# Patient Record
Sex: FEMALE | Race: WHITE | NOT HISPANIC OR LATINO | Employment: OTHER | URBAN - METROPOLITAN AREA
[De-identification: names, ages, dates, MRNs, and addresses within clinical notes are randomized per-mention and may not be internally consistent; named-entity substitution may affect disease eponyms.]

---

## 2017-01-06 ENCOUNTER — ALLSCRIPTS OFFICE VISIT (OUTPATIENT)
Dept: OTHER | Facility: OTHER | Age: 67
End: 2017-01-06

## 2017-02-01 ENCOUNTER — HOSPITAL ENCOUNTER (OUTPATIENT)
Dept: NON INVASIVE DIAGNOSTICS | Facility: CLINIC | Age: 67
Discharge: HOME | End: 2017-02-01
Payer: COMMERCIAL

## 2017-02-01 DIAGNOSIS — I83.813 VARICOSE VEINS OF BOTH LOWER EXTREMITIES WITH PAIN: ICD-10-CM

## 2017-02-01 PROCEDURE — 93970 EXTREMITY STUDY: CPT

## 2017-02-06 ENCOUNTER — ALLSCRIPTS OFFICE VISIT (OUTPATIENT)
Dept: OTHER | Facility: OTHER | Age: 67
End: 2017-02-06

## 2017-03-03 ENCOUNTER — GENERIC CONVERSION - ENCOUNTER (OUTPATIENT)
Dept: OTHER | Facility: OTHER | Age: 67
End: 2017-03-03

## 2017-03-29 ENCOUNTER — ALLSCRIPTS OFFICE VISIT (OUTPATIENT)
Dept: OTHER | Facility: OTHER | Age: 67
End: 2017-03-29

## 2017-04-13 ENCOUNTER — LAB CONVERSION - ENCOUNTER (OUTPATIENT)
Dept: OTHER | Facility: OTHER | Age: 67
End: 2017-04-13

## 2017-04-13 PROCEDURE — 88305 TISSUE EXAM BY PATHOLOGIST: CPT | Performed by: INTERNAL MEDICINE

## 2017-04-14 ENCOUNTER — LAB REQUISITION (OUTPATIENT)
Dept: LAB | Facility: HOSPITAL | Age: 67
End: 2017-04-14
Payer: COMMERCIAL

## 2017-04-14 ENCOUNTER — ALLSCRIPTS OFFICE VISIT (OUTPATIENT)
Dept: OTHER | Facility: OTHER | Age: 67
End: 2017-04-14

## 2017-04-14 DIAGNOSIS — Z86.010 HISTORY OF COLONIC POLYPS: ICD-10-CM

## 2017-04-14 DIAGNOSIS — D12.3 BENIGN NEOPLASM OF TRANSVERSE COLON: ICD-10-CM

## 2017-05-15 ENCOUNTER — ALLSCRIPTS OFFICE VISIT (OUTPATIENT)
Dept: OTHER | Facility: OTHER | Age: 67
End: 2017-05-15

## 2017-05-15 ENCOUNTER — TRANSCRIBE ORDERS (OUTPATIENT)
Dept: ADMINISTRATIVE | Facility: HOSPITAL | Age: 67
End: 2017-05-15

## 2017-05-15 DIAGNOSIS — I44.7 LEFT BUNDLE BRANCH BLOCK: ICD-10-CM

## 2017-05-15 DIAGNOSIS — R94.31 ABNORMAL ELECTROCARDIOGRAM: Primary | ICD-10-CM

## 2017-05-15 DIAGNOSIS — E78.5 OTHER AND UNSPECIFIED HYPERLIPIDEMIA: ICD-10-CM

## 2017-06-09 ENCOUNTER — GENERIC CONVERSION - ENCOUNTER (OUTPATIENT)
Dept: OTHER | Facility: OTHER | Age: 67
End: 2017-06-09

## 2017-06-20 ENCOUNTER — HOSPITAL ENCOUNTER (OUTPATIENT)
Dept: NON INVASIVE DIAGNOSTICS | Facility: HOSPITAL | Age: 67
Discharge: HOME | End: 2017-06-20
Attending: INTERNAL MEDICINE
Payer: COMMERCIAL

## 2017-06-20 ENCOUNTER — HOSPITAL ENCOUNTER (OUTPATIENT)
Dept: NUCLEAR MEDICINE | Facility: HOSPITAL | Age: 67
Discharge: HOME | End: 2017-06-20
Attending: INTERNAL MEDICINE
Payer: COMMERCIAL

## 2017-06-20 DIAGNOSIS — E78.5 HYPERLIPIDEMIA: ICD-10-CM

## 2017-06-20 DIAGNOSIS — I44.7 LEFT BUNDLE BRANCH BLOCK: ICD-10-CM

## 2017-06-20 DIAGNOSIS — E78.5 OTHER AND UNSPECIFIED HYPERLIPIDEMIA: ICD-10-CM

## 2017-06-20 DIAGNOSIS — R94.31 ABNORMAL ELECTROCARDIOGRAM: ICD-10-CM

## 2017-06-20 PROCEDURE — 93306 TTE W/DOPPLER COMPLETE: CPT

## 2017-06-20 PROCEDURE — 78452 HT MUSCLE IMAGE SPECT MULT: CPT

## 2017-06-20 PROCEDURE — A9502 TC99M TETROFOSMIN: HCPCS

## 2017-06-20 PROCEDURE — 93017 CV STRESS TEST TRACING ONLY: CPT

## 2017-06-20 RX ADMIN — REGADENOSON 0.4 MG: 0.08 INJECTION, SOLUTION INTRAVENOUS at 09:54

## 2017-07-26 ENCOUNTER — ALLSCRIPTS OFFICE VISIT (OUTPATIENT)
Dept: OTHER | Facility: OTHER | Age: 67
End: 2017-07-26

## 2017-08-04 ENCOUNTER — ALLSCRIPTS OFFICE VISIT (OUTPATIENT)
Dept: OTHER | Facility: OTHER | Age: 67
End: 2017-08-04

## 2017-08-23 ENCOUNTER — ALLSCRIPTS OFFICE VISIT (OUTPATIENT)
Dept: OTHER | Facility: OTHER | Age: 67
End: 2017-08-23

## 2017-09-04 ENCOUNTER — GENERIC CONVERSION - ENCOUNTER (OUTPATIENT)
Dept: OTHER | Facility: OTHER | Age: 67
End: 2017-09-04

## 2017-10-16 ENCOUNTER — ALLSCRIPTS OFFICE VISIT (OUTPATIENT)
Dept: OTHER | Facility: OTHER | Age: 67
End: 2017-10-16

## 2017-10-16 DIAGNOSIS — Z12.31 ENCOUNTER FOR SCREENING MAMMOGRAM FOR MALIGNANT NEOPLASM OF BREAST: ICD-10-CM

## 2017-10-16 DIAGNOSIS — E78.5 HYPERLIPIDEMIA: ICD-10-CM

## 2017-10-16 DIAGNOSIS — R20.0 ANESTHESIA OF SKIN: ICD-10-CM

## 2017-10-16 DIAGNOSIS — L23.7 ALLERGIC CONTACT DERMATITIS DUE TO PLANTS, EXCEPT FOOD: ICD-10-CM

## 2017-10-16 DIAGNOSIS — E03.9 HYPOTHYROIDISM: ICD-10-CM

## 2017-10-16 DIAGNOSIS — M79.7 FIBROMYALGIA: ICD-10-CM

## 2017-10-16 DIAGNOSIS — R10.9 ABDOMINAL PAIN: ICD-10-CM

## 2017-10-17 NOTE — PROGRESS NOTES
Assessment  1  Rhus dermatitis (692 6) (L23 7)   2  Abdominal pain, unspecified abdominal location (789 00) (R10 9)   3  Osteopenia (733 90) (M85 80)    Plan  Abdominal pain, unspecified abdominal location    · (1) COMPREHENSIVE METABOLIC PANEL; Status:Active; Requested for:16Oct2017; Abdominal pain, unspecified abdominal location, Hyperlipidemia, Rhus dermatitis    · (1) GGT; Status:Active; Requested for:16Oct2017;   Asymptomatic postmenopausal state, Osteopenia    · * DXA BONE DENSITY SPINE HIP AND PELVIS; Status:Hold For - Scheduling;  Requested for:24Nov2017;   Encounter for screening mammogram for breast cancer    · * MAMMO SCREENING BILATERAL W CAD; Status:Hold For - Scheduling; Requested  for:16Oct2017;   Fibromyalgia    · (1) VITAMIN D 25-HYDROXY; Status:Active; Requested for:16Oct2017;   Hyperlipidemia    · (1) LIPID PANEL FASTING W DIRECT LDL REFLEX; Status:Active; Requested  for:16Oct2017;   Hypothyroidism    · (1) TSH; Status:Active; Requested for:16Oct2017;    · * US ABDOMEN COMPLETE; Status:Hold For - Scheduling; Requested for:16Oct2017;   Numbness    · (1) VITAMIN B12; Status:Active; Requested for:16Oct2017;   Rhus dermatitis    · MethylPREDNISolone 4 MG Oral Tablet Therapy Pack; take as directed   · MethylPREDNISolone Acetate 80 MG/ML Injection Suspension; USE AS  DIRECTED; To Be Done: 54IMD9276    Discussion/Summary    Discuss with GI whether taking Pepcid would work  Possible side effects of new medications were reviewed with the patient/guardian today  The treatment plan was reviewed with the patient/guardian  The patient/guardian understands and agrees with the treatment plan      Chief Complaint  Patient c/o poison ivy 10 days  History of Present Illness  Rash (Brief): The patient is being seen for an initial evaluation of this episode of a rash  Symptoms: rash,-- widespread rash,-- patchy rash-- and-- pruritus, but-- no fever,-- no malaise,-- no fatigue-- and-- no headache   Symptom Cluster Details: she reports the symptoms are worsening  Current Treatment: she is currently not being treated for this problem  Pertinent History: Exposure history: plants  Evaluation and Treatment History: she has not been previously evaluated for this problem  Past treatment has included antihistamines  Review of Systems    Gastrointestinal: abdominal pain-- and-- nausea  Neurological: numbness-- and-- CARPAL TUNNEL  Active Problems  1  Abnormal ECG (794 31) (R94 31)   2  Colon cancer screening (V76 51) (Z12 11)   3  Fibromyalgia (729 1) (M79 7)   4  GERD (gastroesophageal reflux disease) (530 81) (K21 9)   5  Herniated Disc (L5 - S1) Central (722 10)   6  Herniated Disc (T12 - L1) Right (722 11)   7  Hyperlipidemia (272 4) (E78 5)   8  Hypertension (401 9) (I10)   9  Hypothyroidism (244 9) (E03 9)   10  Irritable bowel syndrome with constipation (564 1) (K58 1)   11  LBBB (left bundle branch block) (426 3) (I44 7)   12  Lumbosacral spine instability (724 6) (M53 2X7)   13  Need for vaccination (V05 9) (Z23)   14  Osteoarthritis of left knee (715 96) (M17 12)   15  Pain syndrome, chronic (338 4) (G89 4)   16  PUD (peptic ulcer disease) (533 90) (K27 9)   17  Sacroiliitis (720 2) (M46 1)   18  Screening for genitourinary condition (V81 6) (Z13 89)   19  Varicose veins of legs (454 9) (I83 93)    Past Medical History  1  History of Acute URI (465 9) (J06 9)   2  History of Arthritis (V13 4)   3  History of Chronic constipation (564 00) (K59 09)   4  History of Depression (311) (F32 9)   5  History of Encounter for Medicare annual wellness exam (V70 0) (Z00 00)   6  History of Encounter for routine gynecological examination (V72 31) (Z01 419)   7  History of Encounter for special screening examination for genitourinary disorder (V81 6)   (Z13 89)   8  History of Flu vaccine need (V04 81) (Z23)   9  History of Frequent headaches (784 0) (R51)   10  History of High cholesterol (272 0) (E78 00)   11  History of acute bronchitis (V12 69) (Z87 09)   12  History of balance disorder (V13 89) (Z87 898)   13  History of chest pain (V13 89) (Z87 898)   14  History of colonic polyps (V12 72) (Z86 010)   15  History of dermatitis (V13 3) (Z87 2)   16  History of esophageal reflux (V12 79) (Z87 19)   17  History of heartburn (V12 79) (Z87 898)   18  History of low back pain (V13 59) (Z87 39)   19  History of osteoarthritis (V13 4) (Z87 39)   20  Hypertension (401 9) (I10)   21  History of Incontinence (788 30) (R32)   22  History of Neuritis (729 2) (M79 2)   23  History of Pain in extremity (729 5) (M79 609)   24  History of Screening for osteoporosis (V82 81) (Z13 820)   25  History of Stress Incontinence (788 39)   26  History of Tinnitus of both ears (388 30) (H93 13)   27  History of Trouble swallowing (787 20) (R13 10)   28  History of Varicose veins of both lower extremities with pain (454 8) (I83 813)   29  History of Yeast infection (112 9) (B37 9)  Active Problems And Past Medical History Reviewed: The active problems and past medical history were reviewed and updated today  Family History  Mother    1  Family history of Breast Cancer (V16 3)  Family History    2  Family history of Carcinoma Of The Stomach (V16 0)   3  Family history of Colon Cancer (V16 0)   4  Family history of Congestive Heart Failure   5  Family history of Diabetes Mellitus (V18 0)   6  Family history of Hypertension (V17 49)   7  Family history of Thyroid Disorder (V18 19)  Family History Reviewed: The family history was reviewed and updated today  Social History   · Denied: History of Caffeine Use   · Denied: History of Drug Use   · Five children   · 4 biological, 1 adopted   · Marital History -  (V61 03)   · Never A Smoker   · Never Drank Alcohol   · 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   · Retired   · Uses Safety Equipment - Seatbelts  The social history was reviewed and updated today  Surgical History  1   History of Breast Surgery   2  History of Incisional Breast Biopsy   3  History of Knee Surgery   4  History of Laparoscopy (Diagnostic)   5  History of Neck Surgery   6  History of Nose Surgery   7  History of Salpingo-oophorectomy Left Side   8  History of Salpingo-oophorectomy Right Side   9  History of Total Abdominal Hysterectomy  Surgical History Reviewed: The surgical history was reviewed and updated today  Current Meds   1  AmLODIPine Besylate 2 5 MG Oral Tablet; take 1 tablet by mouth once daily; Therapy: 12WVF8511 to (Dulce Mckeon)  Requested for: 55Ivh7434; Last   Rx:59Bem5992 Ordered   2  Butalbital-APAP-Caffeine -40 MG Oral Tablet; TAKE 1 OR 2 TABLETS EVERY 4   HOURS AS NEEDED FOR HEADACHE  DO NOT EXCEED 6 TABLETS IN 24 HOURS; Therapy: 85CVA3835 to (Evaluate:65Ude9299)  Requested for: 60TLY1887; Last   Rx:29Mar2017 Ordered   3  EQL Vitamin D3 1000 UNIT TABS; Take 1 tablet daily Recorded   4  Levothyroxine Sodium 100 MCG Oral Tablet; take 1 tablet by mouth once daily; Therapy: 65ZEK4887 to (Evaluate:30Hbv4259)  Requested for: 45PTH2078; Last   Rx:12Cmd5988 Ordered   5  Multivitamins CAPS; TAKE 1 CAPSULE Daily Recorded   6  Nystatin 750353 UNIT/ML Mouth/Throat Suspension; SWISH AND SWALLOW 5ML 4   TIMES DAILY; Therapy: 07FIX2104 to ([facility])  Requested for: 29BJG5175; Last   Rx:29Mar2017 Ordered    The medication list was reviewed and updated today  Allergies  1  Avelox TABS   2  Benzonatate CAPS   3  Biaxin TABS   4  Caffeine TABS   5  Chlorhexidine Digluconate SOLN   6  Co Q 10 CAPS   7  Codeine Sulfate TABS   8  Doxycycline Hyclate CAPS   9  Latex Gloves MISC   10  Levaquin TABS   11  nizatidine   12  Oxycodone-Acetaminophen CAPS   13  Pravastatin Sodium TABS   14  RABEprazole Sodium TBEC   15  Ranitidine TABS   16  Surgical TAPE   17  Metoclopramide HCl TABS   18   Trulance TABS    Vitals   Recorded: 39HOF2185 04:12PM   Temperature 98 4 F   Heart Rate 62 Respiration 16   Systolic 293   Diastolic 78   Height 5 ft 5 5 in   Weight 211 lb 6 08 oz   BMI Calculated 34 64   BSA Calculated 2 03     Physical Exam    Constitutional   General appearance: Abnormal   uncomfortable-- and-- obese  Pulmonary   Auscultation of lungs: Clear to auscultation  Cardiovascular   Auscultation of heart: Normal rate and rhythm, normal S1 and S2, without murmurs  Abdomen   Abdomen: Non-tender, no masses  Lymphatic   Palpation of lymph nodes in neck: No lymphadenopathy  Skin   Examination of the skin for lesions: Abnormal   Multiple, red papule(s)  on the abdomen and on both arms  Signatures   Electronically signed by :  Phan Dimas MD; Oct 16 2017  4:37PM EST                       (Author)

## 2017-10-24 ENCOUNTER — TRANSCRIBE ORDERS (OUTPATIENT)
Dept: ADMINISTRATIVE | Facility: HOSPITAL | Age: 67
End: 2017-10-24

## 2017-10-24 ENCOUNTER — APPOINTMENT (OUTPATIENT)
Dept: LAB | Facility: MEDICAL CENTER | Age: 67
End: 2017-10-24
Payer: COMMERCIAL

## 2017-10-24 DIAGNOSIS — E78.5 HYPERLIPIDEMIA: ICD-10-CM

## 2017-10-24 DIAGNOSIS — L23.7 ALLERGIC CONTACT DERMATITIS DUE TO PLANTS, EXCEPT FOOD: ICD-10-CM

## 2017-10-24 DIAGNOSIS — R10.9 ABDOMINAL PAIN: ICD-10-CM

## 2017-10-24 DIAGNOSIS — M79.7 FIBROMYALGIA: ICD-10-CM

## 2017-10-24 DIAGNOSIS — E03.9 HYPOTHYROIDISM: ICD-10-CM

## 2017-10-24 DIAGNOSIS — R20.0 ANESTHESIA OF SKIN: ICD-10-CM

## 2017-10-24 LAB — 25(OH)D3 SERPL-MCNC: 32.2 NG/ML (ref 30–100)

## 2017-10-24 PROCEDURE — 84443 ASSAY THYROID STIM HORMONE: CPT

## 2017-10-24 PROCEDURE — 82306 VITAMIN D 25 HYDROXY: CPT

## 2017-10-24 PROCEDURE — 82977 ASSAY OF GGT: CPT

## 2017-10-24 PROCEDURE — 80061 LIPID PANEL: CPT

## 2017-10-24 PROCEDURE — 80053 COMPREHEN METABOLIC PANEL: CPT

## 2017-10-24 PROCEDURE — 36415 COLL VENOUS BLD VENIPUNCTURE: CPT

## 2017-10-24 PROCEDURE — 82607 VITAMIN B-12: CPT

## 2017-10-25 ENCOUNTER — GENERIC CONVERSION - ENCOUNTER (OUTPATIENT)
Dept: OTHER | Facility: OTHER | Age: 67
End: 2017-10-25

## 2017-10-25 LAB
ALBUMIN SERPL BCP-MCNC: 3.5 G/DL (ref 3.5–5)
ALP SERPL-CCNC: 92 U/L (ref 46–116)
ALT SERPL W P-5'-P-CCNC: 24 U/L (ref 12–78)
ANION GAP SERPL CALCULATED.3IONS-SCNC: 7 MMOL/L (ref 4–13)
AST SERPL W P-5'-P-CCNC: 11 U/L (ref 5–45)
BILIRUB SERPL-MCNC: 0.57 MG/DL (ref 0.2–1)
BUN SERPL-MCNC: 20 MG/DL (ref 5–25)
CALCIUM SERPL-MCNC: 8.8 MG/DL (ref 8.3–10.1)
CHLORIDE SERPL-SCNC: 106 MMOL/L (ref 100–108)
CHOLEST SERPL-MCNC: 209 MG/DL (ref 50–200)
CO2 SERPL-SCNC: 27 MMOL/L (ref 21–32)
CREAT SERPL-MCNC: 0.82 MG/DL (ref 0.6–1.3)
GFR SERPL CREATININE-BSD FRML MDRD: 74 ML/MIN/1.73SQ M
GGT SERPL-CCNC: 27 U/L (ref 5–85)
GLUCOSE P FAST SERPL-MCNC: 78 MG/DL (ref 65–99)
HDLC SERPL-MCNC: 74 MG/DL (ref 40–60)
LDLC SERPL CALC-MCNC: 118 MG/DL (ref 0–100)
POTASSIUM SERPL-SCNC: 4 MMOL/L (ref 3.5–5.3)
PROT SERPL-MCNC: 7.3 G/DL (ref 6.4–8.2)
SODIUM SERPL-SCNC: 140 MMOL/L (ref 136–145)
TRIGL SERPL-MCNC: 86 MG/DL
TSH SERPL DL<=0.05 MIU/L-ACNC: 0.56 UIU/ML (ref 0.36–3.74)
VIT B12 SERPL-MCNC: 364 PG/ML (ref 100–900)

## 2017-11-13 ENCOUNTER — HOSPITAL ENCOUNTER (OUTPATIENT)
Dept: ULTRASOUND IMAGING | Facility: MEDICAL CENTER | Age: 67
Discharge: HOME | End: 2017-11-13
Payer: COMMERCIAL

## 2017-11-13 DIAGNOSIS — E03.9 HYPOTHYROIDISM: ICD-10-CM

## 2017-11-13 PROCEDURE — 76700 US EXAM ABDOM COMPLETE: CPT

## 2017-11-15 ENCOUNTER — GENERIC CONVERSION - ENCOUNTER (OUTPATIENT)
Dept: OTHER | Facility: OTHER | Age: 67
End: 2017-11-15

## 2017-11-24 DIAGNOSIS — M85.80 OTHER SPECIFIED DISORDERS OF BONE DENSITY AND STRUCTURE, UNSPECIFIED SITE (CODE): ICD-10-CM

## 2017-11-24 DIAGNOSIS — Z78.0 ASYMPTOMATIC MENOPAUSAL STATE: ICD-10-CM

## 2017-11-24 DIAGNOSIS — K13.70 LESION OF ORAL MUCOSA: ICD-10-CM

## 2017-12-01 ENCOUNTER — GENERIC CONVERSION - ENCOUNTER (OUTPATIENT)
Dept: FAMILY MEDICINE CLINIC | Facility: CLINIC | Age: 67
End: 2017-12-01

## 2017-12-05 ENCOUNTER — ALLSCRIPTS OFFICE VISIT (OUTPATIENT)
Dept: OTHER | Facility: OTHER | Age: 67
End: 2017-12-05

## 2017-12-06 ENCOUNTER — APPOINTMENT (OUTPATIENT)
Dept: LAB | Facility: MEDICAL CENTER | Age: 67
End: 2017-12-06
Payer: COMMERCIAL

## 2017-12-06 ENCOUNTER — TRANSCRIBE ORDERS (OUTPATIENT)
Dept: ADMINISTRATIVE | Facility: HOSPITAL | Age: 67
End: 2017-12-06

## 2017-12-06 DIAGNOSIS — K13.70 LESION OF ORAL MUCOSA: ICD-10-CM

## 2017-12-06 LAB
ALBUMIN SERPL BCP-MCNC: 3.4 G/DL (ref 3.5–5)
ALP SERPL-CCNC: 85 U/L (ref 46–116)
ALT SERPL W P-5'-P-CCNC: 23 U/L (ref 12–78)
ANION GAP SERPL CALCULATED.3IONS-SCNC: 5 MMOL/L (ref 4–13)
AST SERPL W P-5'-P-CCNC: 16 U/L (ref 5–45)
BASOPHILS # BLD AUTO: 0.07 THOUSANDS/ΜL (ref 0–0.1)
BASOPHILS NFR BLD AUTO: 1 % (ref 0–1)
BILIRUB SERPL-MCNC: 0.26 MG/DL (ref 0.2–1)
BUN SERPL-MCNC: 17 MG/DL (ref 5–25)
CALCIUM SERPL-MCNC: 9.4 MG/DL (ref 8.3–10.1)
CHLORIDE SERPL-SCNC: 103 MMOL/L (ref 100–108)
CO2 SERPL-SCNC: 30 MMOL/L (ref 21–32)
CREAT SERPL-MCNC: 0.97 MG/DL (ref 0.6–1.3)
EOSINOPHIL # BLD AUTO: 0.22 THOUSAND/ΜL (ref 0–0.61)
EOSINOPHIL NFR BLD AUTO: 3 % (ref 0–6)
ERYTHROCYTE [DISTWIDTH] IN BLOOD BY AUTOMATED COUNT: 13.8 % (ref 11.6–15.1)
GFR SERPL CREATININE-BSD FRML MDRD: 60 ML/MIN/1.73SQ M
GLUCOSE SERPL-MCNC: 86 MG/DL (ref 65–140)
HCT VFR BLD AUTO: 39.8 % (ref 34.8–46.1)
HGB BLD-MCNC: 12.9 G/DL (ref 11.5–15.4)
LYMPHOCYTES # BLD AUTO: 2.35 THOUSANDS/ΜL (ref 0.6–4.47)
LYMPHOCYTES NFR BLD AUTO: 37 % (ref 14–44)
MCH RBC QN AUTO: 32.1 PG (ref 26.8–34.3)
MCHC RBC AUTO-ENTMCNC: 32.4 G/DL (ref 31.4–37.4)
MCV RBC AUTO: 99 FL (ref 82–98)
MONOCYTES # BLD AUTO: 0.66 THOUSAND/ΜL (ref 0.17–1.22)
MONOCYTES NFR BLD AUTO: 10 % (ref 4–12)
NEUTROPHILS # BLD AUTO: 3.11 THOUSANDS/ΜL (ref 1.85–7.62)
NEUTS SEG NFR BLD AUTO: 49 % (ref 43–75)
NRBC BLD AUTO-RTO: 0 /100 WBCS
PLATELET # BLD AUTO: 234 THOUSANDS/UL (ref 149–390)
PMV BLD AUTO: 10.7 FL (ref 8.9–12.7)
POTASSIUM SERPL-SCNC: 4 MMOL/L (ref 3.5–5.3)
PROT SERPL-MCNC: 7.5 G/DL (ref 6.4–8.2)
RBC # BLD AUTO: 4.02 MILLION/UL (ref 3.81–5.12)
SODIUM SERPL-SCNC: 138 MMOL/L (ref 136–145)
WBC # BLD AUTO: 6.42 THOUSAND/UL (ref 4.31–10.16)

## 2017-12-06 PROCEDURE — 85025 COMPLETE CBC W/AUTO DIFF WBC: CPT

## 2017-12-06 PROCEDURE — 80053 COMPREHEN METABOLIC PANEL: CPT

## 2017-12-06 PROCEDURE — 36415 COLL VENOUS BLD VENIPUNCTURE: CPT

## 2017-12-06 NOTE — PROGRESS NOTES
Assessment    1  Lateral epicondylitis of left elbow (726 32) (M77 12)   2  Tinnitus of both ears (388 30) (H93 13)   3  Oral mucosal lesion (528 9) (K13 70)    Plan  Hypertension    · AmLODIPine Besylate 2 5 MG Oral Tablet; take 1 tablet by mouth once daily  Oral mucosal lesion    · (1) CBC/PLT/DIFF; Status:Active; Requested for:58Kqj2555;    · (1) COMPREHENSIVE METABOLIC PANEL; Status:Active; Requested for:84Ptk7581;     Chief Complaint  Patient c/o difficulty eating, stomach ache, nausea x3 weeks, and left elbow pain  Pt c/o rash on right foot  History of Present Illness  HPI: c/o persistant nausea and then has to have bowel movement, uncomfortable due to rectal discomfort,still cc/o urinary frequency especially nocturiflu shot to lt elbow pain and then she banged it few days ago now with persistant painskin on feet      Review of Systems   Constitutional: feeling poorly-- and-- feeling tired, but-- No fever, no chills, feels well, no tiredness, no recent weight gain or loss  ENT: no ear ache, no loss of hearing, no nosebleeds or nasal discharge, no sore throat or hoarseness  Cardiovascular: no complaints of slow or fast heart rate, no chest pain, no palpitations, no leg claudication or lower extremity edema  Respiratory: no complaints of shortness of breath, no wheezing, no dyspnea on exertion, no orthopnea or PND  Breasts: no complaints of breast pain, breast lump or nipple discharge  Gastrointestinal: no complaints of abdominal pain, no constipation, no nausea or diarrhea, no vomiting, no bloody stools  Genitourinary: as noted in HPI  Musculoskeletal: myalgias  Integumentary: no complaints of skin rash or lesion, no itching or dry skin, no skin wounds  Neurological: as noted in HPI  Active Problems  1  Abdominal pain, unspecified abdominal location (789 00) (R10 9)   2  Abnormal ECG (794 31) (R94 31)   3  Asymptomatic postmenopausal state (V49 81) (Z78 0)   4   Colon cancer screening (V76 51) (Z12 11)   5  Encounter for screening mammogram for breast cancer (V76 12) (Z12 31)   6  Fibromyalgia (729 1) (M79 7)   7  GERD (gastroesophageal reflux disease) (530 81) (K21 9)   8  Herniated Disc (L5 - S1) Central (722 10)   9  Herniated Disc (T12 - L1) Right (722 11)   10  Hyperlipidemia (272 4) (E78 5)   11  Hypertension (401 9) (I10)   12  Hypothyroidism (244 9) (E03 9)   13  Irritable bowel syndrome with constipation (564 1) (K58 1)   14  LBBB (left bundle branch block) (426 3) (I44 7)   15  Lumbosacral spine instability (724 6) (M53 2X7)   16  Need for vaccination (V05 9) (Z23)   17  Needs flu shot (V04 81) (Z23)   18  Numbness (782 0) (R20 0)   19  Osteoarthritis of left knee (715 96) (M17 12)   20  Osteopenia (733 90) (M85 80)   21  Pain syndrome, chronic (338 4) (G89 4)   22  PUD (peptic ulcer disease) (533 90) (K27 9)   23  Rhus dermatitis (692 6) (L23 7)   24  Sacroiliitis (720 2) (M46 1)   25  Screening for genitourinary condition (V81 6) (Z13 89)   26  Varicose veins of legs (454 9) (I83 93)    Past Medical History    1  History of Acute URI (465 9) (J06 9)   2  History of Arthritis (V13 4)   3  History of Chronic constipation (564 00) (K59 09)   4  History of Depression (311) (F32 9)   5  History of Encounter for Medicare annual wellness exam (V70 0) (Z00 00)   6  History of Encounter for routine gynecological examination (V72 31) (Z01 419)   7  History of Encounter for special screening examination for genitourinary disorder (V81 6) (Z13 89)   8  History of Flu vaccine need (V04 81) (Z23)   9  History of Frequent headaches (784 0) (R51)   10  History of High cholesterol (272 0) (E78 00)   11  History of acute bronchitis (V12 69) (Z87 09)   12  History of balance disorder (V13 89) (Z87 898)   13  History of chest pain (V13 89) (Z87 898)   14  History of colonic polyps (V12 72) (Z86 010)   15  History of dermatitis (V13 3) (Z87 2)   16  History of esophageal reflux (V12 79) (Z87 19)   17   History of heartburn (V12 79) (Z87 898)   18  History of low back pain (V13 59) (Z87 39)   19  History of osteoarthritis (V13 4) (Z87 39)   20  Hypertension (401 9) (I10)   21  History of Incontinence (788 30) (R32)   22  History of Neuritis (729 2) (M79 2)   23  History of Pain in extremity (729 5) (M79 609)   24  History of Screening for osteoporosis (V82 81) (Z13 820)   25  History of Stress Incontinence (788 39)   26  History of Trouble swallowing (787 20) (R13 10)   27  History of Varicose veins of both lower extremities with pain (454 8) (I83 813)   28  History of Yeast infection (112 9) (B37 9)  Active Problems And Past Medical History Reviewed: The active problems and past medical history were reviewed and updated today  Family History  Mother    1  Family history of Breast Cancer (V16 3)  Family History    2  Family history of Carcinoma Of The Stomach (V16 0)   3  Family history of Colon Cancer (V16 0)   4  Family history of Congestive Heart Failure   5  Family history of Diabetes Mellitus (V18 0)   6  Family history of Hypertension (V17 49)   7  Family history of Thyroid Disorder (V18 19)    Social History   · Denied: History of Caffeine Use   · Denied: History of Drug Use   · Five children   · 4 biological, 1 adopted   · Marital History -  (V61 03)   · Never A Smoker   · Never Drank Alcohol   · Episcopalian Affiliation Orthodox   · Retired   · Uses Safety Equipment - Seatbelts  The social history was reviewed and updated today  The social history was reviewed and is unchanged  Surgical History    1  History of Breast Surgery   2  History of Incisional Breast Biopsy   3  History of Knee Surgery   4  History of Laparoscopy (Diagnostic)   5  History of Neck Surgery   6  History of Nose Surgery   7  History of Salpingo-oophorectomy Left Side   8  History of Salpingo-oophorectomy Right Side   9  History of Total Abdominal Hysterectomy    Current Meds   1   AmLODIPine Besylate 2 5 MG Oral Tablet; take 1 tablet by mouth once daily; Therapy: 51KWG3038 to (22 172293)  Requested for: 79OUL5060; Last Rx:30Oct2017 Ordered   2  Butalbital-APAP-Caffeine -40 MG Oral Tablet; TAKE 1 OR 2 TABLETS EVERY 4 HOURS AS NEEDED FOR HEADACHE  DO NOT EXCEED 6 TABLETS IN 24 HOURS; Therapy: 20TTN0585 to (Evaluate:03Aih1322)  Requested for: 79GFO2701; Last Rx:29Mar2017 Ordered   3  EQL Vitamin D3 1000 UNIT TABS; Take 1 tablet daily Recorded   4  Levothyroxine Sodium 100 MCG Oral Tablet; take 1 tablet by mouth once daily; Therapy: 99JPF1410 to (Evaluate:14Jan2018)  Requested for: 27HQU9479; Last Rx:01Vxc5544 Ordered   5  Multivitamins CAPS; TAKE 1 CAPSULE Daily Recorded   6  Nystatin 785182 UNIT/ML Mouth/Throat Suspension; SWISH AND SWALLOW 5ML 4 TIMES DAILY; Therapy: 53VMS0932 to (Chet Rinne)  Requested for: 55DJX5022; Last Rx:29Mar2017 Ordered    The medication list was reviewed and updated today  Allergies  1  Avelox TABS   2  Benzonatate CAPS   3  Biaxin TABS   4  Caffeine TABS   5  Chlorhexidine Digluconate SOLN   6  Co Q 10 CAPS   7  Codeine Sulfate TABS   8  Doxycycline Hyclate CAPS   9  Latex Gloves MISC   10  Levaquin TABS   11  nizatidine   12  Oxycodone-Acetaminophen CAPS   13  Pravastatin Sodium TABS   14  RABEprazole Sodium TBEC   15  Ranitidine TABS   16  Surgical TAPE   17  Metoclopramide HCl TABS   18  Trulance TABS    Vitals   Recorded: 13ERV6836 02:09PM   Temperature 98 4 F   Heart Rate 66   Respiration 16   Systolic 426   Diastolic 82   Height 5 ft 5 5 in   Weight 206 lb 2 08 oz   BMI Calculated 33 78   BSA Calculated 2 01       Physical Exam   Constitutional  General appearance: No acute distress, well appearing and well nourished  Ears, Nose, Mouth, and Throat  Otoscopic examination: Tympanic membranes translucent with normal light reflex  Canals patent without erythema  Oropharynx: Normal with no erythema, edema, exudate or lesions     Pulmonary  Auscultation of lungs: Clear to auscultation  Cardiovascular  Auscultation of heart: Normal rate and rhythm, normal S1 and S2, without murmurs  Abdomen  Abdomen: Non-tender, no masses  Musculoskeletal  Inspection/palpation of joints, bones, and muscles: Abnormal  -- er laterael epicondyle lt elbow  Skin  Skin and subcutaneous tissue: Normal without rashes or lesions           Signatures   Electronically signed by : Hernan Bee DO; Dec  5 2017  3:28PM EST                       (Author)

## 2017-12-08 ENCOUNTER — GENERIC CONVERSION - ENCOUNTER (OUTPATIENT)
Dept: OTHER | Facility: OTHER | Age: 67
End: 2017-12-08

## 2017-12-11 ENCOUNTER — GENERIC CONVERSION - ENCOUNTER (OUTPATIENT)
Dept: OTHER | Facility: OTHER | Age: 67
End: 2017-12-11

## 2018-01-11 NOTE — RESULT NOTES
Verified Results  (1) COMPREHENSIVE METABOLIC PANEL 10RDL3205 69:23LA [facility] Order Number: [id number]     Test Name Result Flag Reference   GLUCOSE,RANDM 91 mg/dL     If the patient is fasting, the ADA then defines impaired fasting glucose as > 100 mg/dL and diabetes as > or equal to 123 mg/dL  SODIUM 141 mmol/L  136-145   POTASSIUM 3 6 mmol/L  3 5-5 3   CHLORIDE 103 mmol/L  100-108   CARBON DIOXIDE 30 mmol/L  21-32   ANION GAP (CALC) 8 mmol/L  4-13   BLOOD UREA NITROGEN 16 mg/dL  5-25   CREATININE 0 91 mg/dL  0 60-1 30   Standardized to IDMS reference method   CALCIUM 9 7 mg/dL  8 3-10 1   BILI, TOTAL 0 47 mg/dL  0 20-1 00   ALK PHOSPHATAS 106 U/L     ALT (SGPT) 37 U/L  12-78   AST(SGOT) 23 U/L  5-45   ALBUMIN 3 8 g/dL  3 5-5 0   TOTAL PROTEIN 7 6 g/dL  6 4-8 2   eGFR Non-African American      >60 0 ml/min/1 73sq m   - Patient Instructions: This is a fasting blood test  Water, black tea or black coffee only after 9:00pm the night before test Drink 2 glasses of water the morning of test - Patient Instructions: This bloodwork is non-fasting  Please drink two glasses of   water morning of bloodwork  National Kidney Disease Education Program recommendations are as follows:  GFR calculation is accurate only with a steady state creatinine  Chronic Kidney disease less than 60 ml/min/1 73 sq  meters  Kidney failure less than 15 ml/min/1 73 sq  meters  (1) LIPID PANEL FASTING W DIRECT LDL REFLEX 00NTD7820 09:07AM [facility] Order Number: [id number]     Test Name Result Flag Reference   CHOLESTEROL 248 mg/dL H    LDL CHOLESTEROL CALCULATED 152 mg/dL H 0-100   - Patient Instructions: This is a fasting blood test  Water, black tea or black coffee only after 9:00pm the night before test   Drink 2 glasses of water the morning of test     - Patient Instructions:  This is a fasting blood test  Water, black tea or black coffee only after 9:00pm the night before test Drink 2 glasses of water the morning of test - Patient Instructions: This bloodwork is non-fasting  Please drink two glasses of   water morning of bloodwork  Triglyceride:         Normal              <150 mg/dl       Borderline High    150-199 mg/dl       High               200-499 mg/dl       Very High          >499 mg/dl  Cholesterol:         Desirable        <200 mg/dl      Borderline High  200-239 mg/dl      High             >239 mg/dl  HDL Cholesterol:        High    >59 mg/dL      Low     <41 mg/dL  LDL Cholesterol:        Optimal          <100 mg/dl        Near Optimal     100-129 mg/dl        Above Optimal          Borderline High   130-159 mg/dl          High              160-189 mg/dl          Very High        >189 mg/dl  LDL CALCULATED:    This screening LDL is a calculated result  It does not have the accuracy of the Direct Measured LDL in the monitoring of patients with hyperlipidemia and/or statin therapy  Direct Measure LDL (VAN986) must be ordered separately in these patients  TRIGLYCERIDES 60 mg/dL  <=150   Specimen collection should occur prior to N-Acetylcysteine or Metamizole administration due to the potential for falsely depressed results  HDL,DIRECT 84 mg/dL H 40-60   Specimen collection should occur prior to Metamizole administration due to the potential for falsely depressed results  (1) TSH 92DOA6081 09:07AM [facility] Order Number: [id number]     Test Name Result Flag Reference   TSH 0 420 uIU/mL  0 358-3 740   - Patient Instructions: This bloodwork is non-fasting  Please drink two glasses of water morning of bloodwork  - Patient Instructions: This is a fasting blood test  Water, black tea or black coffee only after 9:00pm the night before test Drink 2 glasses of water the morning of test - Patient Instructions: This bloodwork is non-fasting  Please drink two glasses of   water morning of bloodwork    Patients undergoing fluorescein dye angiography may retain small amounts of fluorescein in the body for 48-72 hours post procedure  Samples containing fluorescein can produce falsely depressed TSH values  If the patient had this procedure,a specimen should be resubmitted post fluorescein clearance            The recommended reference ranges for TSH during pregnancy are as follows:  First trimester 0 1 to 2 5 uIU/mL  Second trimester  0 2 to 3 0 uIU/mL  Third trimester 0 3 to 3 0 uIU/m

## 2018-01-11 NOTE — PROCEDURES
Procedures by Rogelia Prader, DO  at 10/31/2016  1:22 PM      Author:  Rogelia Prader, DO Service:  Gastroenterology Author Type:  Physician     Filed:  10/31/2016  1:25 PM Date of Service:  10/31/2016  1:22 PM Status:  Signed     :  Rogelia Prader, DO (Physician)            ESOPHAGOGASTRODUODENOSCOPY    PROCEDURE: EGD    SEDATION: Monitored anesthesia care, check anesthesia records    INDICATIONS: GERD and dyspepsia    CONSENT:  Informed consent was obtained for the procedure, including sedation after explaining the risks and benefits of the procedure  Risks including but not limited to bleeding, perforation, infection, and missed lesion  PREPARATION:   Telemetry, pulse oximetry, blood pressure were monitored throughout the procedure  Patient was identified by myself both verbally and by visual inspection of ID band  DESCRIPTION:   Patient was placed in the left lateral decubitus position and was sedated with the above medication  The gastroscope was introduced in to the oropharynx and the esophagus was intubated  under direct visualization  Scope was passed down the esophagus up to 2nd part of the duodenum  A careful inspection was made as the gastroscope was withdrawn, including a retroflexed view of the stomach; findings and interventions are described below  FINDINGS:    #1  Esophagus- normal esophagus, normal GE junction    #2  Stomach- a few small clean based antral ulcers, biopsied with cold forceps    #3  Duodenum- one small clean based ulcer in the first part of the duodenum, biopsied normal appearing duodenum to assess for celiac disease         IMPRESSIONS:    Antral and duodenal ulcers    RECOMMENDATIONS:   Avoid NSAIDs  Await biopsy results  Will need twice daily PPI for 8 weeks and repeat EGD in 8 weeks to document ulcer healing      COMPLICATIONS:  None; patient tolerated the procedure well            DISPOSITION: PACU           CONDITION: Stable             Received for:Anh Welsh DO  Oct 31 2016  1:25PM [facility] Standard Time

## 2018-01-12 VITALS
DIASTOLIC BLOOD PRESSURE: 82 MMHG | RESPIRATION RATE: 16 BRPM | HEIGHT: 66 IN | SYSTOLIC BLOOD PRESSURE: 132 MMHG | BODY MASS INDEX: 34.07 KG/M2 | WEIGHT: 212 LBS | TEMPERATURE: 97.3 F | HEART RATE: 61 BPM

## 2018-01-12 VITALS
BODY MASS INDEX: 33.33 KG/M2 | SYSTOLIC BLOOD PRESSURE: 130 MMHG | WEIGHT: 207.38 LBS | DIASTOLIC BLOOD PRESSURE: 82 MMHG | HEIGHT: 66 IN

## 2018-01-13 VITALS
HEIGHT: 66 IN | DIASTOLIC BLOOD PRESSURE: 80 MMHG | HEART RATE: 61 BPM | TEMPERATURE: 98.1 F | SYSTOLIC BLOOD PRESSURE: 128 MMHG | WEIGHT: 200.5 LBS | BODY MASS INDEX: 32.22 KG/M2 | OXYGEN SATURATION: 95 %

## 2018-01-13 VITALS
HEART RATE: 70 BPM | SYSTOLIC BLOOD PRESSURE: 115 MMHG | HEIGHT: 66 IN | DIASTOLIC BLOOD PRESSURE: 78 MMHG | BODY MASS INDEX: 33.43 KG/M2 | WEIGHT: 208 LBS

## 2018-01-13 NOTE — MISCELLANEOUS
Message   Recorded as Task   Date: 09/20/2017 04:35 PM, Created By: Jaguar Hsu   Task Name: Call Back   Assigned To: Monica Adams   Regarding Patient: Marizol Mooney, Status: Active   CommentPatti Ortez - 20 Sep 2017 4:35 PM     TASK CREATED  pt called in and would like you to return her call when you can   Monica Adams - 20 Sep 2017 4:53 PM     TASK EDITED  pt relates that she was shaving and cut her keg over ankle where she has engorged vein  pt pressure dressing on wound and seems ok   gave pt instructionsfor care of wound and rational for possible follow up        Signatures   Electronically signed by : Oscar Smith DO; Sep 20 2017  4:54PM EST                       (Author)

## 2018-01-13 NOTE — RESULT NOTES
Verified Results  (1) TISSUE EXAM 31Oct2016 01:15PM Karlie Zuñiga     Test Name Result Flag Reference   LAB AP CASE REPORT (Report)     Surgical Pathology Report             Case: [id number]                   Authorizing Provider: Jaime Grullon DO    Collected:      10/31/2016 1315        Ordering Location:   [facility]    Received:      10/31/2016 [facility]                              Pathologist:      Kristi Galindo MD                              Specimens:  A) - Small Bowel, NOS, Small bowel bx r/o celiac                            B) - Stomach, Antral bx r/o H pylori   LAB AP FINAL DIAGNOSIS (Report)     A  Small bowel, biopsy:        - Small bowel mucosa with no significant pathologic alteration         - No villous blunting, intraepithelial lymphocytosis or crypt   hyperplasia is identified suggest malabsorptive enteropathy         - No acute or peptic duodenitis is identified         - No dysplasia or malignancy is identified  B  Stomach, antrum, biopsy:        - Antral and oxyntic mucosa with chronic inactive gastritis,   and focal foveolar hyperplasia           - No Helicobacter pylori organisms are identified on the   immunohistochemical stain, performed with an appropriate positive control         - Benign glandular elements are highlighted on the Alcian   blue-PAS stain, performed with an appropriate control         - No intestinal metaplasia, dysplasia or malignancy is   identified  Interpretation performed at , [address]   Electronically signed by Kristi Galindo MD on 11/3/2016 at 2:35 PM   LAB AP SURGICAL ADDITIONAL INFORMATION (Report)     These tests were developed and their performance characteristics   determined by [facility] or [facility]  They may not be cleared or approved by the [place]  Food and   Drug Administration   The FDA has determined that such clearance or   approval is not necessary  These tests are used for clinical purposes  They should not be regarded as investigational or for research  This   laboratory has been approved by [place] [address], designated as a high-complexity   laboratory and is qualified to perform these tests  LAB AP GROSS DESCRIPTION (Report)     A  The specimen is received in formalin, labeled with the patient's name   and hospital number, and is designated small bowel biopsy rule out   celiac  The specimen consists of multiple tan soft tissue fragments   measuring in aggregate 0 6 x 0 6 x 0 1 cm  Entirely submitted  One   cassette  B  The specimen is received in formalin, labeled with the patient's name   and hospital number, and is designated antral biopsy rule out H    pylori  The specimen consists of 2 tan soft tissue fragments each   measuring 0 5 cm  Entirely submitted  One cassette  Note: The estimated total formalin fixation time based upon information   provided by the submitting clinician and the standard processing schedule   is 24 75 hours      MAC

## 2018-01-13 NOTE — PROGRESS NOTES
Assessment    1  Fibromyalgia (729 1) (M79 7)   2  Pain syndrome, chronic (338 4) (G89 4)   3  Varicose veins of both lower extremities with pain (454 8) (I83 813)    Plan    1  Gradient compression stocking, below knee, 20-30mm Hg, each; Status:Complete;     Done: 72RHC1186   2  VAS LOWER LIMB VENOUS DUPLEX STUDY, WITH REFLUX ASSESSMENT, COMPLETE   BILATERAL; Status:Active; Requested for:66Qdv8293;    3  Apply moisturizing cream or lotion several times a day ; Status:Complete;   Done:   10DPQ1146   4  Begin or continue regular aerobic exercise  Gradually work up to at least count1   sessions of dur1 of exercise a week ; Status:Complete;   Done: 23Giu9523   5  Keep your leg elevated whenever you can to decrease swelling and increase circulation ;   Status:Complete;   Done: 76SBF5654   6  Support stockings can help keep the blood from pooling in the small veins in your feet   and legs ; Status:Complete;   Done: 96YHF5084   7  Follow-up visit in 6 months Evaluation and Treatment  Follow-up  Status: Hold For -   Scheduling  Requested for: 26Rds2552    Discussion/Summary  Discussion Summary:   Bilateral lower extremity varicose veins with pain  Many other factors may be contributing to her symptoms including fibromyalgia, sciatic nerve pain and knee arthroplasty 6 months ago  She does get relief with compression stockings  Instructed to wear compression more consistently  New Rx given  Will do venous study with reflux component  I will see her back in the office in 6 months to recheck  Counseling Documentation With Imm: The patient was counseled regarding instructions for management, patient and family education, impressions  Chief Complaint  Chief Complaint Free Text Note Form: "check my veins"      History of Present Illness  Varicose Veins [facility]ira [facility]: The patient is being seen for an initial evaluation of an existing diagnosis of varicose veins     Symptoms:  bilateral bulging veins, bilateral discolored veins, bilateral leg swelling, bilateral spider veins and bilateral leg pain  The patient describes the pain as aching, itching, burning, throbbing, stinging, heavy and dull  This patient has no history of DVT, pulmonary embolism, superficial venous thrombosis, or a hypercoagulable state  Evaluation and Treatment History: She was previously evaluated by a primary physician  This patient has had no prior surgical treatment of the venous system  Current treatment includes  over the counter compression hose  Free Text HPI: Patient is new to the office and was referred by her PCP Dr Laura Meier  Presents with bulging varicose veins in bilateral lower extremity along with spider veins  States occasionally her left foot and ankle swell in the morning or later on in the day  Also complains that occasionally her toes turn "light blue" when she gets up in the morning and after her showers  Patient has also been experiencing cramping in both calves and toes  Review of Systems  Complete Female - Vasc:   Constitutional: feeling tired, but no fever, loss in appetite, no recent weight gain, no chills and no recent weight loss  Eyes: itching of the eyes, no sudden vision loss and no double vision, but no eye pain, no eyesight problems, no dryness of the eyes, eyes not red, no purulent discharge from the eyes, wears glasses and blurred vision  ENT: sore throat and hearing loss, but no earache, no nosebleeds, no nasal discharge and no hoarseness  Cardiovascular: irregular heart rate, palpitations and no bleeding veins, but no intermittent leg claudication, painful veins and leg pain with walking  Respiratory: no wheezing, no cough, no orthopnea and no complaints of PND, but shortness of breath during exertion  Gastrointestinal: no nausea, no vomiting, no constipation, no diarrhea and no blood in stools   hemorrhoids   Genitourinary: incontinence, nocturia, no dysmenorrhea and no unexplained vaginal bleeding, but no dysuria, no genital lesions, hematuria and vaginal discharge  Musculoskeletal: joint swelling, myalgias and joint stiffness, but lumbar pain, no limb pain and no limb swelling  Integumentary: a rash, itching and dry skin, but no skin lesions, no skin wound and ulcers  Neurological: headache, numbness, no dementia, dizziness, limb weakness and difficulty walking, but no confusion, no convulsions and no fainting  Psychiatric: depression and no mood disorder, but no anxiety  Hematologic/Lymphatic: swollen glands, a tendency for easy bruising and swollen glands in the neck, but no bleeding disorder, no easy bruising and no tendency for easy bleeding  ROS Reviewed:   ROS reviewed  Active Problems     1  Abdominal pain (789 00) (R10 9)   2  Acute bronchitis (466 0) (J20 9)   3  Acute URI (465 9) (J06 9)   4  Breast self examination education, encounter for (V65 49) (Z71 89)   5  Chronic constipation (564 00) (K59 09)   6  Dermatitis (692 9) (L30 9)   7  Encounter for Medicare annual wellness exam (V70 0) (Z00 00)   8  Encounter for routine gynecological examination (V72 31) (Z01 419)   9  Encounter for screening mammogram for breast cancer (V76 12) (Z12 31)   10  Encounter for special screening examination for genitourinary disorder (V81 6) (Z13 89)   11  Fibromyalgia (729 1) (M79 7)   12  Flu vaccine need (V04 81) (Z23)   13  Frequent headaches (784 0) (R51)   14  Herniated Disc (L5 - S1) Central (722 10)   15  Herniated Disc (T12 - L1) Right (722 11)   16  Hyperlipidemia (272 4) (E78 5)   17  Hypothyroidism (244 9) (E03 9)   18  Lower back pain (724 2) (M54 5)   19  Lumbosacral spine instability (724 6) (M53 2X7)   20  Need for vaccination (V05 9) (Z23)   21  Neuritis (729 2) (M79 2)   22  Osteoarthritis (715 90) (M19 90)   23  Osteoarthritis of left knee (715 96) (M17 9)   24  Pain in extremity (729 5) (M79 609)   25  Pain syndrome, chronic (338 4) (G89 4)   26   Sacroiliitis (720 2) (M46 1)   27  Screening for osteoporosis (V82 81) (Z13 820)   28  Stress Incontinence (788 39)   29  Varicose veins of legs (454 9) (I83 93)   30  Yeast infection (112 9) (B37 9)    Hypertension (401 9) (I10)          Past Medical History     1  Breast self examination education, encounter for (V65 49) (Z71 89)   2  History of Depression (311) (F32 9)   3  History of chest pain (V13 89) (Z64 054)  Active Problems And Past Medical History Reviewed: The active problems and past medical history were reviewed and updated today  History of Arthritis Bilateral (V13 4)     - knees             Surgical History  Surgical History Reviewed: The surgical history was reviewed and updated today  Family History  Mother    1  Family history of Breast Cancer (V16 3)  Family History    2  Family history of Carcinoma Of The Stomach (V16 0)   3  Family history of Colon Cancer (V16 0)   4  Family history of Congestive Heart Failure   5  Family history of Diabetes Mellitus (V18 0)   6  Family history of Hypertension (V17 49)   7  Family history of Thyroid Disorder (V18 19)  Family History Reviewed: The family history was reviewed and updated today  Social History    · Denied: History of Caffeine Use   · Denied: History of Drug Use   · Five children   · Marital History -  (V61 03)   · Never A Smoker   · Never Drank Alcohol   · Religion Affiliation Latter-day   · Retired   · Uses Safety Equipment - Seatbelts  Social History Reviewed: The social history was reviewed and updated today  Current Meds   1  Advil 200 MG Oral Capsule; TAKE 1 CAPSULE EVERY 4 TO 6 HOURS Recorded   2  Aleve CAPS; Take 1 capsule every 12 hours as needed Recorded   3  AmLODIPine Besylate 2 5 MG Oral Tablet; TAKE 1 TABLET DAILY; Therapy: 46YKJ3998 to (Evaluate:67Ykq7235)  Requested for: 19Apr2016; Last   Rx:19Apr2016 Ordered   4   Butalbital-APAP-Caffeine -40 MG Oral Tablet; TAKE 1 OR 2 TABLETS EVERY 4   HOURS AS NEEDED FOR HEADACHE  DO NOT EXCEED 6 TABLETS IN 24 HOURS; Therapy: 36HCY9853 to (Vianey Salazar)  Requested for: 49ONG6782; Last   Rx:07Nov2014 Ordered   5  EQL Vitamin D3 1000 UNIT Oral Tablet; Take 1 tablet daily Recorded   6  Levothyroxine Sodium 100 MCG Oral Tablet; take 1 tablet by mouth once daily; Therapy: 66XSB7020 to (Evaluate:21Jan2017)  Requested for: 39Plh2810; Last   Rx:84Avn7831 Ordered   7  MethylPREDNISolone 4 MG Oral Tablet Therapy Pack; Take as directed on pack; Therapy: 43Rzv1035 to (Last Rx:19Apr2016)  Requested for: 64Cgj4507 Ordered   8  Multivitamins CAPS; TAKE 1 CAPSULE Daily Recorded   9  Ondansetron 4 MG Oral Tablet Dispersible; 1 PO BID PRN NAUSEA Recorded   10  TraMADol HCl - 50 MG Oral Tablet; TAKE 1 TABLET 4 TIMES DAILY AS NEEDED    Recorded  Medication List Reviewed: The medication list was reviewed and updated today  Allergies    1  Avelox TABS   2  Benzonatate CAPS   3  Biaxin TABS   4  Caffeine TABS   5  Chlorhexidine Digluconate SOLN   6  Codeine Sulfate TABS   7  Doxycycline Hyclate CAPS   8  Latex Gloves MISC   9  Levaquin TABS   10  nizatidine   11  Oxycodone-Acetaminophen CAPS   12  RABEprazole Sodium TBEC   13  Surgical TAPE    Vitals  Vital Signs    Recorded: 60KIY7210 72:15XJ   Systolic 296, LUE   Diastolic 70, LUE   Heart Rate 72, L Radial   Respiration 16   Height 5 ft 5 5 in   Weight 191 lb 4 oz   BMI Calculated 31 34   BSA Calculated 1 95     Physical Exam    Posterior tibialis: right 2+ and left 2+  Dorsalis pedis: right 2+ and left 2+  Distal Pulse Exam: Normal Capillary Refill  Extremities: No upper or lower extremity edema  LE Varicose Veins: right leg truncal veins, left leg truncal veins, right leg reticular veins, left leg reticular veins, right leg spider veins and left leg spider veins  The heart rate was normal  The rhythm was regular  Heart sounds: normal S1 and normal S2    Murmurs: No murmurs were heard     Pulmonary   Respiratory effort: No increased work of breathing or signs of respiratory distress  Auscultation of lungs: Clear to auscultation  No wheezing, no rales, no rhonchi  Abdomen   Abdomen: Abdomen soft, non-tender, no masses, non distended, no rebound tenderness  Psychiatric   Orientation to person, place and time: Normal    Mood and affect: Normal    Neurologic Sensory exam normal   Motor skills intact  Musculoskeletal   Gait and station: Normal    Skin   Skin and subcutaneous tissue: Normal without rashes or lesions  Palpation of skin and subcutaneous tissue: Normal turgor  Venous Disease: No lipodermatosclerosis, stasis dermatitis, hyperpigmentation, or atrophie gege noted on exam       Future Appointments    Date/Time Provider Specialty Site   09/13/2016 09:30 AM Aubrey Welsh,  Gastroenterology Adult [facility] 9     Signatures   Electronically signed by : Dennis Lopes; Aug 25 2016 12:13PM EST                       (Author)    Electronically signed by : Neeta Barrett MD; Aug 30 2016  5:22PM EST                       (Author)    Electronically signed by :  Neeta Barrett MD; Aug 30 2016  5:22PM EST                       (Author)
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
## 2018-01-14 VITALS
BODY MASS INDEX: 33.97 KG/M2 | HEART RATE: 62 BPM | RESPIRATION RATE: 16 BRPM | HEIGHT: 66 IN | TEMPERATURE: 98.4 F | DIASTOLIC BLOOD PRESSURE: 78 MMHG | SYSTOLIC BLOOD PRESSURE: 140 MMHG | WEIGHT: 211.38 LBS

## 2018-01-14 VITALS
DIASTOLIC BLOOD PRESSURE: 70 MMHG | SYSTOLIC BLOOD PRESSURE: 120 MMHG | HEIGHT: 66 IN | WEIGHT: 213.5 LBS | BODY MASS INDEX: 34.31 KG/M2 | HEART RATE: 68 BPM

## 2018-01-14 VITALS
WEIGHT: 207 LBS | DIASTOLIC BLOOD PRESSURE: 72 MMHG | BODY MASS INDEX: 33.27 KG/M2 | SYSTOLIC BLOOD PRESSURE: 130 MMHG | HEIGHT: 66 IN

## 2018-01-15 VITALS
HEIGHT: 66 IN | SYSTOLIC BLOOD PRESSURE: 126 MMHG | HEART RATE: 66 BPM | RESPIRATION RATE: 18 BRPM | BODY MASS INDEX: 32.95 KG/M2 | WEIGHT: 205 LBS | DIASTOLIC BLOOD PRESSURE: 68 MMHG

## 2018-01-15 NOTE — PROGRESS NOTES
Assessment    1  Encounter for preventive health examination (V70 0) (Z00 00)    Discussion/Summary  health maintenance visit Currently, she eats an adequate diet and has an inadequate exercise regimen  cervical cancer screening is current Breast cancer screening: mammogram is current  Colorectal cancer screening: colorectal cancer screening is current  Osteoporosis screening: bone mineral density testing is current  Advice and education were given regarding weight bearing exercise, calcium supplements and vitamin D supplements  Patient discussion: discussed with the patient  Hepatitis C Screening: the patient was counseled on Hepatitis C screening  The patient declines Hepatitis C screening  Chief Complaint  annual physical exam      History of Present Illness  HM, Adult Female: The patient is being seen for a health maintenance evaluation  The last health maintenance visit was 1 year(s) ago  General Health: The patient's health since the last visit is described as fair  Screening:      Review of Systems    Constitutional: feeling poorly  Eyes: No complaints of eye pain, no red eyes, no eyesight problems, no discharge, no dry eyes, no itching of eyes  ENT: no complaints of earache, no loss of hearing, no nose bleeds, no nasal discharge, no sore throat, no hoarseness and as noted in HPI  Cardiovascular: No complaints of slow heart rate, no fast heart rate, no chest pain, no palpitations, no leg claudication, no lower extremity edema  Respiratory: No complaints of shortness of breath, no wheezing, no cough, no SOB on exertion, no orthopnea, no PND  Gastrointestinal: abdominal pain, constipation and as per gi follow up  Genitourinary: incontinence  Musculoskeletal: arthralgias  Integumentary: No complaints of skin rash or lesions, no itching, no skin wounds, no breast pain or lump  Neurological: balance     Psychiatric: Not suicidal, no sleep disturbance, no anxiety or depression, no change in personality, no emotional problems  Endocrine: No complaints of proptosis, no hot flashes, no muscle weakness, no deepening of the voice, no feelings of weakness  Hematologic/Lymphatic: No complaints of swollen glands, no swollen glands in the neck, does not bleed easily, does not bruise easily  Active Problems    1  Abnormal ECG (794 31) (R94 31)   2  History of Breast self examination education, encounter for (V65 49) (Z71 89)   3  Colon cancer screening (V76 51) (Z12 11)   4  Fibromyalgia (729 1) (M79 7)   5  GERD (gastroesophageal reflux disease) (530 81) (K21 9)   6  Herniated Disc (L5 - S1) Central (722 10)   7  Herniated Disc (T12 - L1) Right (722 11)   8  Hyperlipidemia (272 4) (E78 5)   9  Hypertension (401 9) (I10)   10  Hypothyroidism (244 9) (E03 9)   11  Irritable bowel syndrome with constipation (564 1) (K58 1)   12  LBBB (left bundle branch block) (426 3) (I44 7)   13  Lumbosacral spine instability (724 6) (M53 2X7)   14  Need for vaccination (V05 9) (Z23)   15  Osteoarthritis of left knee (715 96) (M17 12)   16  Pain syndrome, chronic (338 4) (G89 4)   17  PUD (peptic ulcer disease) (533 90) (K27 9)   18  Sacroiliitis (720 2) (M46 1)   19  Screening for genitourinary condition (V81 6) (Z13 89)   20  Thrush (112 0) (B37 0)   21   Varicose veins of legs (454 9) (I83 93)    Past Medical History    · History of Acute URI (465 9) (J06 9)   · History of Arthritis (V13 4)   · History of Breast self examination education, encounter for (V65 49) (Z71 89)   · History of Chronic constipation (564 00) (K59 09)   · History of Depression (311) (F32 9)   · History of Encounter for Medicare annual wellness exam (V70 0) (Z00 00)   · History of Encounter for routine gynecological examination (V72 31) (Z01 419)   · History of Encounter for screening mammogram for breast cancer (V76 12) (Z12 31)   · History of Encounter for special screening examination for genitourinary disorder (V81 6)  (Z13 89)   · History of Flu vaccine need (V04 81) (Z23)   · History of Frequent headaches (784 0) (R51)   · History of High cholesterol (272 0) (E78 00)   · History of abdominal pain (V13 89) (G74 151)   · History of acute bronchitis (V12 69) (Z87 09)   · History of balance disorder (V13 89) (G09 832)   · History of chest pain (V13 89) (Z97 550)   · History of colonic polyps (V12 72) (Z86 010)   · History of dermatitis (V13 3) (Z87 2)   · History of esophageal reflux (V12 79) (Z87 19)   · History of heartburn (V12 79) (T64 921)   · History of low back pain (V13 59) (Z87 39)   · History of osteoarthritis (V13 4) (Z87 39)   · Hypertension (401 9) (I10)   · History of Incontinence (788 30) (R32)   · History of Neuritis (729 2) (M79 2)   · History of Pain in extremity (729 5) (M79 609)   · History of Screening for osteoporosis (V82 81) (Z13 820)   · History of Stress Incontinence (788 39)   · History of Tinnitus of both ears (388 30) (H93 13)   · History of Trouble swallowing (787 20) (R13 10)   · History of Varicose veins of both lower extremities with pain (454 8) (I83 813)   · History of Yeast infection (112 9) (B37 9)    Surgical History    · History of Breast Surgery   · History of Incisional Breast Biopsy   · History of Knee Surgery   · History of Laparoscopy (Diagnostic)   · History of Neck Surgery   · History of Nose Surgery   · History of Salpingo-oophorectomy Left Side   · History of Salpingo-oophorectomy Right Side   · History of Total Abdominal Hysterectomy    Family History  Mother    · Family history of Breast Cancer (V16 3)  Family History    · Family history of Carcinoma Of The Stomach (V16 0)   · Family history of Colon Cancer (V16 0)   · Family history of Congestive Heart Failure   · Family history of Diabetes Mellitus (V18 0)   · Family history of Hypertension (V17 49)   · Family history of Thyroid Disorder (V18 19)    Social History    · Denied: History of Caffeine Use   · Denied: History of Drug Use   · Five children   · 4 biological, 1 adopted   · Marital History -  (V61 03)   · Never A Smoker   · Never Drank Alcohol   · Yarsani Affiliation Yarsani   · Retired   · Uses Safety Equipment - Seatbelts    Current Meds   1  AmLODIPine Besylate 2 5 MG Oral Tablet; take 1 tablet by mouth once daily; Therapy: 58MVN1454 to (Jack Dorsey)  Requested for: 59Mry8012; Last   Rx:04Aug2017 Ordered   2  Butalbital-APAP-Caffeine -40 MG Oral Tablet; TAKE 1 OR 2 TABLETS EVERY 4   HOURS AS NEEDED FOR HEADACHE  DO NOT EXCEED 6 TABLETS IN 24 HOURS; Therapy: 77UFU1904 to (Evaluate:02Lxb0044)  Requested for: 10TXW2572; Last   Rx:29Mar2017 Ordered   3  EQL Vitamin D3 1000 UNIT TABS; Take 1 tablet daily Recorded   4  Levothyroxine Sodium 100 MCG Oral Tablet; take 1 tablet by mouth once daily; Therapy: 71QYI6165 to (Evaluate:14Jan2018)  Requested for: 36ZWP1898; Last   Rx:03Xxl1832 Ordered   5  Multivitamins CAPS; TAKE 1 CAPSULE Daily Recorded   6  Nystatin 045540 UNIT/ML Mouth/Throat Suspension; SWISH AND SWALLOW 5ML 4   TIMES DAILY; Therapy: 40CEV4945 to (Gus Tse)  Requested for: 73KEV9325; Last   Rx:29Mar2017 Ordered    Allergies    1  Avelox TABS   2  Benzonatate CAPS   3  Biaxin TABS   4  Caffeine TABS   5  Chlorhexidine Digluconate SOLN   6  Co Q 10 CAPS   7  Codeine Sulfate TABS   8  Doxycycline Hyclate CAPS   9  Latex Gloves MISC   10  Levaquin TABS   11  nizatidine   12  Oxycodone-Acetaminophen CAPS   13  Pravastatin Sodium TABS   14  RABEprazole Sodium TBEC   15  Ranitidine TABS   16  Surgical TAPE   17  Metoclopramide HCl TABS   18  Trulance TABS    Vitals   Recorded: 23Aug2017 11:16AM   Heart Rate 68   Respiration 16   Systolic 205   Diastolic 72   Height 5 ft 5 5 in   Weight 212 lb 3 2 oz   BMI Calculated 34 77   BSA Calculated 2 03     Physical Exam    Constitutional   General appearance: No acute distress, well appearing and well nourished      Eyes   Pupils and irises: Equal, round, reactive to light  Ears, Nose, Mouth, and Throat   Otoscopic examination: Tympanic membranes translucent with normal light reflex  Canals patent without erythema  Oropharynx: Normal with no erythema, edema, exudate or lesions  Neck   Thyroid: Normal, no thyromegaly  Pulmonary   Auscultation of lungs: Clear to auscultation  Cardiovascular   Auscultation of heart: Normal rate and rhythm, normal S1 and S2, no murmurs  Peripheral vascular exam: Normal     Abdomen   Abdomen: Non-tender, no masses  Lymphatic   Palpation of lymph nodes in neck: No lymphadenopathy  Musculoskeletal   Gait and station: Normal   uses cane for balance        Signatures   Electronically signed by : Aida Calloway DO; Sep  4 2017  9:35PM EST                       (Author)

## 2018-01-16 NOTE — RESULT NOTES
Verified Results  (1) COMPREHENSIVE METABOLIC PANEL [id number] 77:15QD [facility] Order Number: [id number]     Test Name Result Flag Reference   SODIUM 140 mmol/L  136-145   POTASSIUM 4 0 mmol/L  3 5-5 3   CHLORIDE 106 mmol/L  100-108   CARBON DIOXIDE 27 mmol/L  21-32   ANION GAP (CALC) 7 mmol/L  4-13   BLOOD UREA NITROGEN 20 mg/dL  5-25   CREATININE 0 82 mg/dL  0 60-1 30   Standardized to IDMS reference method   CALCIUM 8 8 mg/dL  8 3-10 1   BILI, TOTAL 0 57 mg/dL  0 20-1 00   ALK PHOSPHATAS 92 U/L     ALT (SGPT) 24 U/L  12-78   Specimen collection should occur prior to Sulfasalazine and/or Sulfapyridine administration due to the potential for falsely depressed results  AST(SGOT) 11 U/L  5-45   Specimen collection should occur prior to Sulfasalazine administration due to the potential for falsely depressed results  ALBUMIN 3 5 g/dL  3 5-5 0   TOTAL PROTEIN 7 3 g/dL  6 4-8 2   eGFR 74 ml/min/1 73sq [facility] Disease Education Program recommendations are as follows:  GFR calculation is accurate only with a steady state creatinine  Chronic Kidney disease less than 60 ml/min/1 73 sq  meters  Kidney failure less than 15 ml/min/1 73 sq  meters  GLUCOSE FASTING 78 mg/dL  65-99   Specimen collection should occur prior to Sulfasalazine administration due to the potential for falsely depressed results  Specimen collection should occur prior to Sulfapyridine administration due to the potential for falsely elevated results       (1) GGT 24Oct2017 11:27AM [facility] Order Number: [id number]     Test Name Result Flag Reference   GGT 27 U/L  5-85     (1) LIPID PANEL FASTING W DIRECT LDL REFLEX 24Oct2017 11:27AM [facility] Order Number: [id number]     Test Name Result Flag Reference   CHOLESTEROL 209 mg/dL H    LDL CHOLESTEROL CALCULATED 118 mg/dL H 0-100   Triglyceride:        Normal <150 mg/dl   Borderline High 150-199 mg/dl   High 200-499 mg/dl Very High >499 mg/dl      Cholesterol:       Desirable <200 mg/dl    Borderline High 200-239 mg/dl    High >239 mg/dl      HDL Cholesterol:       High>59 mg/dL    Low <41 mg/dL      HDL Cholesterol:       High>59 mg/dL    Low <41 mg/dL      This screening LDL is a calculated result  It does not have the accuracy of the Direct Measured LDL in the monitoring of patients with hyperlipidemia and/or statin therapy  Direct Measure LDL (TLY401) must be ordered separately in these patients  TRIGLYCERIDES 86 mg/dL  <=150   Specimen collection should occur prior to N-Acetylcysteine or Metamizole administration due to the potential for falsely depressed results  HDL,DIRECT 74 mg/dL H 40-60   Specimen collection should occur prior to Metamizole administration due to the potential for falsley depressed results  (1) TSH 24Oct2017 11:27AM [email or web address] Order Number: [id number]     Test Name Result Flag Reference   TSH 0 563 uIU/mL  0 358-3 740   Patients undergoing fluorescein dye angiography may retain small amounts of fluorescein in the body for 48-72 hours post procedure  Samples containing fluorescein can produce falsely depressed TSH values  If the patient had this procedure,a specimen should be resubmitted post fluorescein clearance            The recommended reference ranges for TSH during pregnancy are as follows:  First trimester 0 1 to 2 5 uIU/mL  Second trimester  0 2 to 3 0 uIU/mL  Third trimester 0 3 to 3 0 uIU/m     (1) VITAMIN D 25-HYDROXY 24Oct2017 11:27AM [email or web address] Order Number: [id number]     Test Name Result Flag Reference   VIT D 25-HYDROX 32 2 ng/mL  30 0-100 0   This assay is a certified procedure of the CDC Vitamin D Standardization Certification Program (VDSCP)     Deficiency <20ng/ml   Insufficiency 20-30ng/ml   Sufficient  ng/ml     *Patients undergoing fluorescein dye angiography may retain small amounts of fluorescein in the body for 48-72 hours post procedure  Samples containing fluorescein can produce falsely elevated Vitamin D values  If the patient had this procedure, a specimen should be resubmitted post fluorescein clearance       (1) VITAMIN B12 24Oct2017 11:27AM Yoan Ayers Order Number: [id number]     Test Name Result Flag Reference   VITAMIN B12 364 pg/mL  100-900

## 2018-01-22 VITALS
RESPIRATION RATE: 16 BRPM | SYSTOLIC BLOOD PRESSURE: 122 MMHG | HEART RATE: 68 BPM | HEIGHT: 66 IN | DIASTOLIC BLOOD PRESSURE: 72 MMHG | WEIGHT: 212.2 LBS | BODY MASS INDEX: 34.1 KG/M2

## 2018-01-23 VITALS
RESPIRATION RATE: 16 BRPM | BODY MASS INDEX: 33.13 KG/M2 | HEIGHT: 66 IN | SYSTOLIC BLOOD PRESSURE: 140 MMHG | TEMPERATURE: 98.4 F | WEIGHT: 206.13 LBS | DIASTOLIC BLOOD PRESSURE: 82 MMHG | HEART RATE: 66 BPM

## 2018-01-23 NOTE — RESULT NOTES
Verified Results  (1) COMPREHENSIVE METABOLIC PANEL [id number] 39:67CV Ruperto Matta Order Number: [id number]     Test Name Result Flag Reference   GLUCOSE,RANDM 86 mg/dL     If the patient is fasting, the ADA then defines impaired fasting glucose as > 100 mg/dL and diabetes as > or equal to 123 mg/dL  Specimen collection should occur prior to Sulfasalazine administration due to the potential for falsely depressed results  Specimen collection should occur prior to Sulfapyridine administration due to the potential for falsely elevated results  SODIUM 138 mmol/L  136-145   POTASSIUM 4 0 mmol/L  3 5-5 3   CHLORIDE 103 mmol/L  100-108   CARBON DIOXIDE 30 mmol/L  21-32   ANION GAP (CALC) 5 mmol/L  4-13   BLOOD UREA NITROGEN 17 mg/dL  5-25   CREATININE 0 97 mg/dL  0 60-1 30   Standardized to IDMS reference method   CALCIUM 9 4 mg/dL  8 3-10 1   BILI, TOTAL 0 26 mg/dL  0 20-1 00   ALK PHOSPHATAS 85 U/L     ALT (SGPT) 23 U/L  12-78   Specimen collection should occur prior to Sulfasalazine and/or Sulfapyridine administration due to the potential for falsely depressed results  AST(SGOT) 16 U/L  5-45   Specimen collection should occur prior to Sulfasalazine administration due to the potential for falsely depressed results  ALBUMIN 3 4 g/dL L 3 5-5 0   TOTAL PROTEIN 7 5 g/dL  6 4-8 2   eGFR 60 ml/min/1 73sq [facility] Disease Education Program recommendations are as follows:  GFR calculation is accurate only with a steady state creatinine  Chronic Kidney disease less than 60 ml/min/1 73 sq  meters  Kidney failure less than 15 ml/min/1 73 sq  meters  (1) CBC/PLT/DIFF [id number] 02:44PM Ruperto Matta Order Number: [id number]     Test Name Result Flag Reference   WBC COUNT 6 42 Thousand/uL  4 31-10 16   RBC COUNT 4 02 Million/uL  3 81-5 12   HEMOGLOBIN 12 9 g/dL  11 5-15 4   HEMATOCRIT 39 8 %  34 8-46  1   MCV 99 fL H 82-98   MCH 32 1 pg  26 8-34 3   MCHC 32 4 g/dL 31 4-37 4   RDW 13 8 %  11 6-15 1   MPV 10 7 fL  8 9-12 7   PLATELET COUNT 968 Thousands/uL  149-390   nRBC AUTOMATED 0 /100 WBCs     NEUTROPHILS RELATIVE PERCENT 49 %  43-75   LYMPHOCYTES RELATIVE PERCENT 37 %  14-44   MONOCYTES RELATIVE PERCENT 10 %  4-12   EOSINOPHILS RELATIVE PERCENT 3 %  0-6   BASOPHILS RELATIVE PERCENT 1 %  0-1   NEUTROPHILS ABSOLUTE COUNT 3 11 Thousands/? ??L  1 85-7 62   LYMPHOCYTES ABSOLUTE COUNT 2 35 Thousands/? ??L  0 60-4 47   MONOCYTES ABSOLUTE COUNT 0 66 Thousand/? ??L  0 17-1 22   EOSINOPHILS ABSOLUTE COUNT 0 22 Thousand/? ??L  0 00-0 61   BASOPHILS ABSOLUTE COUNT 0 07 Thousands/? ??L  0 00-0 10

## 2018-01-24 VITALS
TEMPERATURE: 98.6 F | HEART RATE: 68 BPM | BODY MASS INDEX: 33.04 KG/M2 | HEIGHT: 66 IN | WEIGHT: 205.6 LBS | SYSTOLIC BLOOD PRESSURE: 128 MMHG | RESPIRATION RATE: 16 BRPM | DIASTOLIC BLOOD PRESSURE: 76 MMHG

## 2018-05-03 ENCOUNTER — TELEPHONE (OUTPATIENT)
Dept: FAMILY MEDICINE CLINIC | Facility: CLINIC | Age: 68
End: 2018-05-03

## 2018-05-03 NOTE — TELEPHONE ENCOUNTER
New bp med you put her on she's loosing hair, breaking out in a rash and head and neck hurting worse then the other Rx  Not sure what she should do?

## 2018-05-07 NOTE — TELEPHONE ENCOUNTER
im not sure what med she is talking about but we would need to see ehr  Before I can make any adjustments

## 2018-05-08 ENCOUNTER — TELEPHONE (OUTPATIENT)
Dept: FAMILY MEDICINE CLINIC | Facility: CLINIC | Age: 68
End: 2018-05-08

## 2018-05-08 NOTE — TELEPHONE ENCOUNTER
Pt notified, pt unable to provide bp med name due pt not being at home   Pt scheduled for tomorrow at 3 pm

## 2018-05-09 ENCOUNTER — OFFICE VISIT (OUTPATIENT)
Dept: FAMILY MEDICINE CLINIC | Facility: CLINIC | Age: 68
End: 2018-05-09
Payer: COMMERCIAL

## 2018-05-09 VITALS
SYSTOLIC BLOOD PRESSURE: 126 MMHG | WEIGHT: 204.38 LBS | HEART RATE: 76 BPM | TEMPERATURE: 97.9 F | HEIGHT: 65 IN | BODY MASS INDEX: 34.05 KG/M2 | RESPIRATION RATE: 16 BRPM | DIASTOLIC BLOOD PRESSURE: 94 MMHG

## 2018-05-09 DIAGNOSIS — I10 ESSENTIAL HYPERTENSION: Primary | ICD-10-CM

## 2018-05-09 PROCEDURE — 99214 OFFICE O/P EST MOD 30 MIN: CPT | Performed by: FAMILY MEDICINE

## 2018-05-09 PROCEDURE — 1101F PT FALLS ASSESS-DOCD LE1/YR: CPT | Performed by: FAMILY MEDICINE

## 2018-05-09 RX ORDER — AZITHROMYCIN 250 MG/1
TABLET, FILM COATED ORAL DAILY
COMMUNITY
Start: 2017-12-11 | End: 2018-05-16

## 2018-05-09 RX ORDER — NIFEDIPINE 30 MG/1
1 TABLET, FILM COATED, EXTENDED RELEASE ORAL DAILY
COMMUNITY
Start: 2017-12-06 | End: 2018-05-09 | Stop reason: SINTOL

## 2018-05-09 RX ORDER — TRAMADOL HYDROCHLORIDE 50 MG/1
20 TABLET ORAL EVERY 6 HOURS
COMMUNITY
Start: 2016-03-01 | End: 2018-05-16

## 2018-05-09 RX ORDER — METHYLPREDNISOLONE 4 MG/1
TABLET ORAL
COMMUNITY
Start: 2017-10-16 | End: 2018-05-16

## 2018-05-09 RX ORDER — BUTALBITAL, ACETAMINOPHEN AND CAFFEINE 50; 325; 40 MG/1; MG/1; MG/1
2 TABLET ORAL EVERY 4 HOURS PRN
COMMUNITY
Start: 2014-11-07 | End: 2018-05-16

## 2018-05-09 RX ORDER — AMLODIPINE BESYLATE 2.5 MG/1
2.5 TABLET ORAL DAILY
Qty: 30 TABLET | Refills: 5 | Status: SHIPPED | OUTPATIENT
Start: 2018-05-09 | End: 2018-05-16

## 2018-05-09 RX ORDER — AMLODIPINE BESYLATE 2.5 MG/1
1 TABLET ORAL DAILY
COMMUNITY
Start: 2011-05-13 | End: 2018-05-09 | Stop reason: SDUPTHER

## 2018-05-09 RX ORDER — LEVOTHYROXINE SODIUM 0.1 MG/1
1 TABLET ORAL DAILY
COMMUNITY
Start: 2016-07-25 | End: 2018-05-16

## 2018-05-09 NOTE — PROGRESS NOTES
Assessment/Plan:         Diagnoses and all orders for this visit:    Essential hypertension  -     Discontinue: amLODIPine (NORVASC) 2 5 mg tablet; Take 1 tablet (2 5 mg total) by mouth daily    Other orders  -     Discontinue: Cholecalciferol 2000 units TABS; Take 1 capsule by mouth  -     Discontinue: traMADol (ULTRAM) 50 mg tablet; Take 20 mg by mouth every 6 (six) hours            Subjective:      Patient ID: Leida Aguilar is a 75 y o  female  Concerned that she is not feeling well since starting on nifedapine for bp  Light headed, somme tinnitus headachees        The following portions of the patient's history were reviewed and updated as appropriate: allergies, current medications, past family history, past medical history, past social history, past surgical history and problem list     Review of Systems   Constitutional: Positive for fatigue  HENT: Positive for sinus pressure and tinnitus  Cardiovascular: Positive for chest pain  Gastrointestinal: Positive for abdominal pain and constipation  Genitourinary: Positive for frequency  Musculoskeletal: Positive for arthralgias and myalgias  Neurological: Positive for dizziness  Objective:      /94 (BP Location: Left arm, Patient Position: Sitting, Cuff Size: Adult)   Pulse 76   Temp 97 9 °F (36 6 °C) (Temporal)   Resp 16   Ht 5' 5" (1 651 m)   Wt 92 7 kg (204 lb 6 oz)   BMI 34 01 kg/m²          Physical Exam   Constitutional: She is oriented to person, place, and time  She appears well-developed and well-nourished  HENT:   Head: Normocephalic  Eyes: EOM are normal  Pupils are equal, round, and reactive to light  Neck: No thyromegaly present  Cardiovascular: Normal rate and regular rhythm  No murmur heard  Pulmonary/Chest: Effort normal and breath sounds normal  No respiratory distress  She has no wheezes  She has no rales  Abdominal: She exhibits no distension  Musculoskeletal: She exhibits no edema  Lymphadenopathy:     She has no cervical adenopathy  Neurological: She is alert and oriented to person, place, and time  Skin: Skin is warm  Psychiatric: She has a normal mood and affect   Her behavior is normal

## 2018-05-16 ENCOUNTER — OFFICE VISIT (OUTPATIENT)
Dept: URGENT CARE | Age: 68
End: 2018-05-16
Payer: COMMERCIAL

## 2018-05-16 VITALS
HEART RATE: 68 BPM | TEMPERATURE: 97.7 F | WEIGHT: 206 LBS | BODY MASS INDEX: 34.32 KG/M2 | OXYGEN SATURATION: 95 % | DIASTOLIC BLOOD PRESSURE: 74 MMHG | RESPIRATION RATE: 14 BRPM | SYSTOLIC BLOOD PRESSURE: 124 MMHG | HEIGHT: 65 IN

## 2018-05-16 DIAGNOSIS — T14.8XXA ABRASION: Primary | ICD-10-CM

## 2018-05-16 PROCEDURE — 99213 OFFICE O/P EST LOW 20 MIN: CPT | Performed by: PHYSICIAN ASSISTANT

## 2018-05-16 RX ORDER — PRAVASTATIN SODIUM 20 MG
TABLET ORAL
COMMUNITY
End: 2018-06-06

## 2018-05-16 RX ORDER — AMLODIPINE BESYLATE 2.5 MG/1
TABLET ORAL
COMMUNITY
End: 2021-06-14 | Stop reason: ALTCHOICE

## 2018-05-16 RX ORDER — LEVOTHYROXINE SODIUM 100 UG/1
CAPSULE ORAL
COMMUNITY
End: 2018-08-31 | Stop reason: SDUPTHER

## 2018-05-16 RX ORDER — AMLODIPINE BESYLATE 5 MG/1
TABLET ORAL
COMMUNITY
End: 2018-06-06 | Stop reason: DRUGHIGH

## 2018-05-16 RX ORDER — OMEPRAZOLE 40 MG/1
CAPSULE, DELAYED RELEASE ORAL
COMMUNITY
End: 2018-06-06

## 2018-05-16 RX ORDER — TRAMADOL HYDROCHLORIDE 50 MG/1
TABLET ORAL
COMMUNITY
End: 2018-08-31

## 2018-05-16 RX ORDER — RANITIDINE 150 MG/1
TABLET ORAL
COMMUNITY
End: 2018-06-06

## 2018-05-16 NOTE — PATIENT INSTRUCTIONS
Follow up with PCP in 3-5 days  Proceed to  ER if symptoms worsen  Abrasion   AMBULATORY CARE:   An abrasion  is a scrape on your skin  It happens when your skin rubs against a rough surface  Some examples of an abrasion include rug burn, a skinned elbow, or road rash  Abrasions can be many shapes and sizes  The wound may hurt, bleed, bruise, or swell  Seek care immediately if:   · The bleeding does not stop after 10 minutes of firm pressure  · You cannot rinse one or more foreign objects out of your wound  · You have red streaks on your skin coming from your wound  Contact your healthcare provider if:   · You have a fever or chills  · Your abrasion is red, warm, swollen, or draining pus  · You have questions or concerns about your condition or care  Care for your abrasion:   · Wash your hands and dry them with a clean towel  · Press a clean cloth against your wound to stop any bleeding  · Rinse your wound with a lot of clean water  Do not use harsh soap, alcohol, or iodine solutions  · Use a clean, wet cloth to remove any objects, such as small pieces of rocks or dirt  · Rub antibiotic ointment on your wound  This may help prevent infection and help your wound heal     · Cover the wound with a non-stick bandage  Change the bandage daily, and if gets wet or dirty  Follow up with your healthcare provider as directed:  Write down your questions so you remember to ask them during your visits  © 2017 [employer] [employer] Information is for End User's use only and may not be sold, redistributed or otherwise used for commercial purposes  All illustrations and images included in CareNotes® are the copyrighted property of [employer] , [email or web address]  or [employer]  The above information is an  only  It is not intended as medical advice for individual conditions or treatments   Talk to your doctor, nurse or pharmacist before following any medical regimen to see if it is safe and effective for you

## 2018-05-16 NOTE — PROGRESS NOTES
[facility]        NAME: Leida Koroma is a 75 y o  female  : 1950    MRN: [id number]  DATE: May 16, 2018  TIME: 11:38 AM    Assessment and Plan   Abrasion [T14  8XXA]  1  Abrasion           Patient Instructions       Follow up with PCP in 3-5 days  Proceed to  ER if symptoms worsen  Chief Complaint     Chief Complaint   Patient presents with    Trauma     Left ankle laceration from shaving  Occurred around 1100  Pt denies being on anticoagulants  History of Present Illness       Pt was shaving legs in shower and nicked herself with a new razor on L ankle  Pt with a lot of varicosities to the area  Wasn't able to stop bleeding      Trauma   The incident occurred less than 1 hour ago  The incident occurred at home  The injury mechanism was a cut/puncture wound  The injury occurred in the context of a self-inflicted injury  No protective equipment was used  There is an injury to the left ankle  She is experiencing no pain  It is unlikely that a foreign body is present  Pertinent negatives include no abnormal behavior, chest pain, difficulty breathing, light-headedness or tingling  There have been no prior injuries to these areas  Review of Systems   Review of Systems   Constitutional: Negative  HENT: Negative  Eyes: Negative  Respiratory: Negative  Cardiovascular: Negative  Negative for chest pain  Musculoskeletal: Negative  Skin: Positive for wound  Neurological: Negative for tingling and light-headedness           Current Medications       Current Outpatient Prescriptions:     amLODIPine (NORVASC) 2 5 mg tablet, amlodipine 2 5 mg tablet, Disp: , Rfl:     amLODIPine (NORVASC) 5 mg tablet, amlodipine 5 mg tablet, Disp: , Rfl:     Cholecalciferol (VITAMIN D3) 1000 UNIT/SPRAY LIQD, Vitamin D3, Disp: , Rfl:     Levothyroxine Sodium 100 MCG CAPS, levothyroxine 100 mcg capsule, Disp: , Rfl:     omeprazole (PriLOSEC) 40 MG capsule, omeprazole 40 mg capsule,delayed release, Disp: , Rfl:     pantoprozole (PROTONIX) infusion (Continuous), pantoprazole, Disp: , Rfl:     pravastatin (PRAVACHOL) 20 mg tablet, pravastatin 20 mg tablet, Disp: , Rfl:     ranitidine (ZANTAC) 150 mg tablet, ranitidine 150 mg tablet, Disp: , Rfl:     traMADol (ULTRAM) 50 mg tablet, Take 1 tablet every 4-6 hours by oral route as needed  , Disp: , Rfl:     Current Allergies     Allergies as of 05/16/2018 - Reviewed 05/16/2018   Allergen Reaction Noted    Aciphex [rabeprazole] Itching, Chest Pain, Shortness Of Breath, and GI Intolerance 07/02/2014    Benzonatate Other (See Comments), Chest Pain, and Shortness Of Breath 07/02/2014    Biaxin [clarithromycin] Other (See Comments), Chest Pain, and Nausea Only 04/22/2012    Doxycycline GI Intolerance, Chest Pain, Itching, and Shortness Of Breath 07/02/2014    Avelox [moxifloxacin] GI Intolerance, Chest Pain, and Nausea Only 04/22/2012    Co q 10 [coenzyme q10]  12/20/2016    Codeine GI Intolerance, Chest Pain, and Nausea Only 04/22/2012    Latex Itching 02/08/2016    Metoclopramide GI Intolerance 02/08/2016    Nizatidine Other (See Comments), Chest Pain, Nausea Only, Vomiting, and GI Intolerance 07/22/2015    Oxycodone GI Intolerance 10/31/2016    Oxycodone-acetaminophen Chest Pain and Nausea Only 04/22/2012    Plecanatide  08/23/2017    Pravastatin Other (See Comments), Headache, and Nausea Only 08/09/2016    Ranitidine Other (See Comments), Chest Pain, and GI Intolerance 02/08/2016    Ubidecarenone GI Intolerance 08/09/2016    Caffeine Other (See Comments) and Headache 04/29/2013    Chlorhexidine Rash 07/02/2014    Levaquin [levofloxacin] Rash, Chest Pain, Nausea Only, Other (See Comments), and GI Intolerance 04/22/2012    Other Rash and Edema 04/22/2012    Sulfa antibiotics Rash             The following portions of the patient's history were reviewed and updated as appropriate: allergies, current medications, past family history, past medical history, past social history, past surgical history and problem list      Past Medical History:   Diagnosis Date    Arthritis     OA, bilateral resolved 08/09/2016    Balance disorder     resolved 01/06/2017    Chronic constipation     resolved 01/06/2017    Chronic headaches     Colon polyps     Depression     Dermatitis     resolved 01/06/2017    Esophageal reflux     resolved 01/06/2017    Fibromyalgia     Frequent headaches     resolved 01/06/2017    GERD (gastroesophageal reflux disease)     symptomatic-for EGD today 10/31/2016    Heartburn     Herniated lumbar intervertebral disc     Hyperlipidemia     Hypertension     last assessed 08/04/2017    Hypothyroid     IBS (irritable bowel syndrome)     Incontinence     Mobility impaired     uses cane -needs R  TKR    Osteoarthritis, knee     bilateral resolved 01/06/2017    Stress incontinence     resolved 01/06/2017    SAVANNAH (stress urinary incontinence, female)     Trouble swallowing     Varicose veins of bilateral lower extremities with pain     wears support hose    Varicose veins of lower extremity     both with pain resolved 01/06/2017    Wears dentures     full upper   partial lower    Wears glasses        Past Surgical History:   Procedure Laterality Date    ABDOMINAL SURGERY      laparoscopy    BLADDER SURGERY      stimulator then removal    BREAST SURGERY  09/29/2009    R breast-lumpectomy w/ lymph nodes, 9/2/09 lymph node resection right breast 1985 b/l breast surgery resolved 09/02/2009    COLONOSCOPY      DIAGNOSTIC LAPAROSCOPY      ESOPHAGOGASTRODUODENOSCOPY      HYSTERECTOMY      KEVEN    INCISIONAL BREAST BIOPSY      JOINT REPLACEMENT      L TKR    KNEE SURGERY      left knee 1/28/05 and 1994 right knee 7/22/05, resolved 01/28/2005    NECK SURGERY  1997    lymph node resection     NOSE SURGERY      resolved 10/01/2008    [place] ESOPHAGOGASTRODUODENOSCOPY TRANSORAL DIAGNOSTIC N/A 12/21/2016 Procedure: ESOPHAGOGASTRODUODENOSCOPY (EGD); Surgeon: Jacob Anderson DO;  Location: [facility]; Service: Gastroenterology    [place] ESOPHAGOGASTRODUODENOSCOPY TRANSORAL DIAGNOSTIC N/A 10/31/2016    Procedure: ESOPHAGOGASTRODUODENOSCOPY (EGD); Surgeon: Jacob Anderson DO;  Location: [facility]; Service: Gastroenterology    SALPINGOOPHORECTOMY Bilateral        Family History   Problem Relation Age of Onset    Breast cancer Mother     Stomach cancer Family     Colon cancer Family     Heart failure Family     Diabetes Family     Hypertension Family     Thyroid disease unspecified Family      disorder         Medications have been verified  Objective   /74   Pulse 68   Temp 97 7 °F (36 5 °C)   Resp 14   Ht 5' 5" (1 651 m)   Wt 93 4 kg (206 lb)   SpO2 95%   BMI 34 28 kg/m²        Physical Exam     Physical Exam   Constitutional: She is oriented to person, place, and time  She appears well-developed and well-nourished  No distress  HENT:   Head: Normocephalic and atraumatic  Right Ear: External ear normal    Left Ear: External ear normal    Nose: Nose normal    Mouth/Throat: Oropharynx is clear and moist  No oropharyngeal exudate  Eyes: Conjunctivae are normal    Neck: Normal range of motion  Neck supple  Cardiovascular: Normal rate, regular rhythm, normal heart sounds and intact distal pulses  No murmur heard  Pulmonary/Chest: Effort normal and breath sounds normal  No respiratory distress  She has no rales  Abdominal: Soft  Bowel sounds are normal  There is no tenderness  Musculoskeletal: Normal range of motion  Lymphadenopathy:     She has no cervical adenopathy  Neurological: She is alert and oriented to person, place, and time  Skin: Skin is warm and dry  Abrasion (L lateral ankle  no bleeding at this time   ) noted  No burn, no laceration, no lesion and no petechiae noted  No erythema  Pt with a lot of vascularity on lower extre  mities       Psychiatric: She has a normal mood and affect  Nursing note and vitals reviewed

## 2018-05-28 DIAGNOSIS — I10 ESSENTIAL HYPERTENSION: Primary | ICD-10-CM

## 2018-06-01 RX ORDER — NIFEDIPINE 30 MG/1
TABLET, FILM COATED, EXTENDED RELEASE ORAL
Qty: 30 TABLET | Refills: 5 | Status: SHIPPED | OUTPATIENT
Start: 2018-06-01 | End: 2018-06-06

## 2018-06-06 ENCOUNTER — OFFICE VISIT (OUTPATIENT)
Dept: FAMILY MEDICINE CLINIC | Facility: CLINIC | Age: 68
End: 2018-06-06
Payer: COMMERCIAL

## 2018-06-06 VITALS
WEIGHT: 207.25 LBS | SYSTOLIC BLOOD PRESSURE: 130 MMHG | BODY MASS INDEX: 35.38 KG/M2 | TEMPERATURE: 97.6 F | HEIGHT: 64 IN | RESPIRATION RATE: 16 BRPM | HEART RATE: 74 BPM | DIASTOLIC BLOOD PRESSURE: 80 MMHG

## 2018-06-06 DIAGNOSIS — K21.9 GASTROESOPHAGEAL REFLUX DISEASE, ESOPHAGITIS PRESENCE NOT SPECIFIED: Primary | ICD-10-CM

## 2018-06-06 PROCEDURE — 99213 OFFICE O/P EST LOW 20 MIN: CPT | Performed by: FAMILY MEDICINE

## 2018-06-06 RX ORDER — OMEPRAZOLE 10 MG/1
10 CAPSULE, DELAYED RELEASE ORAL
Qty: 180 CAPSULE | Refills: 0 | Status: SHIPPED | OUTPATIENT
Start: 2018-06-06 | End: 2018-08-31

## 2018-06-06 NOTE — PROGRESS NOTES
Assessment/Plan:         Diagnoses and all orders for this visit:    Gastroesophageal reflux disease, esophagitis presence not specified  -     omeprazole (PriLOSEC) 10 mg delayed release capsule; Take 1 capsule (10 mg total) by mouth 2 (two) times a day          Subjective:      Patient ID: Leida Mcdaniel is a 70 y o  female  Here for follow up of chronic medical conditions  Still with    Co epigastric pain  Urinary incontinenc constipation        The following portions of the patient's history were reviewed and updated as appropriate: allergies, current medications, past family history, past medical history, past social history, past surgical history and problem list     Review of Systems   Constitutional: Positive for fatigue  Gastrointestinal: Positive for abdominal pain, constipation and vomiting  Genitourinary:        Bladder incontince   Musculoskeletal: Positive for arthralgias  Objective:      /80 (BP Location: Left arm, Patient Position: Sitting, Cuff Size: Adult)   Pulse 74   Temp 97 6 °F (36 4 °C) (Temporal)   Resp 16   Ht 5' 4 09" (1 628 m)   Wt 94 kg (207 lb 4 oz)   BMI 35 48 kg/m²          Physical Exam   Constitutional: She is oriented to person, place, and time  She appears well-developed and well-nourished  HENT:   Head: Normocephalic  Eyes: EOM are normal  Pupils are equal, round, and reactive to light  Neck: No thyromegaly present  Cardiovascular: Normal rate and regular rhythm  No murmur heard  Pulmonary/Chest: Effort normal and breath sounds normal  No respiratory distress  She has no wheezes  She has no rales  Abdominal: She exhibits no distension  Musculoskeletal: She exhibits no edema  Lymphadenopathy:     She has no cervical adenopathy  Neurological: She is alert and oriented to person, place, and time  Skin: Skin is warm  Psychiatric: She has a normal mood and affect  Her behavior is normal    Nursing note and vitals reviewed

## 2018-06-17 PROBLEM — K21.9 GASTROESOPHAGEAL REFLUX DISEASE: Status: ACTIVE | Noted: 2018-06-17

## 2018-06-27 DIAGNOSIS — E03.9 HYPOTHYROIDISM, UNSPECIFIED TYPE: Primary | ICD-10-CM

## 2018-06-27 RX ORDER — LEVOTHYROXINE SODIUM 0.1 MG/1
TABLET ORAL
Qty: 90 TABLET | Refills: 1 | Status: SHIPPED | OUTPATIENT
Start: 2018-06-27 | End: 2019-01-11 | Stop reason: SDUPTHER

## 2018-08-15 ENCOUNTER — OFFICE VISIT (OUTPATIENT)
Dept: URGENT CARE | Age: 68
End: 2018-08-15
Payer: COMMERCIAL

## 2018-08-15 VITALS
HEIGHT: 65 IN | HEART RATE: 75 BPM | WEIGHT: 202.6 LBS | SYSTOLIC BLOOD PRESSURE: 134 MMHG | DIASTOLIC BLOOD PRESSURE: 84 MMHG | BODY MASS INDEX: 33.76 KG/M2 | OXYGEN SATURATION: 95 % | TEMPERATURE: 98.8 F | RESPIRATION RATE: 20 BRPM

## 2018-08-15 DIAGNOSIS — T63.464A YELLOW JACKET STING, UNDETERMINED INTENT, INITIAL ENCOUNTER: Primary | ICD-10-CM

## 2018-08-15 PROCEDURE — 99213 OFFICE O/P EST LOW 20 MIN: CPT | Performed by: PHYSICIAN ASSISTANT

## 2018-08-15 NOTE — PATIENT INSTRUCTIONS
Use cream as directed  Benadryl as needed for itching  Follow up with PCP in 3-5 days  Proceed to  ER if symptoms worsen  Insect Bite or Sting   AMBULATORY CARE:   Most insect bites and stings  are not dangerous and go away without treatment  Common examples of insects that bite or sting are bees, ticks, mosquitoes, spiders, and ants  Insect bites or stings can lead to diseases such as malaria, West Nile virus, Lyme disease, or [place] Spotted Fever  Common signs and symptoms:   · Mild symptoms  include a red bump, pain, swelling, and itching  · Anaphylaxis symptoms  include throat tightness, trouble breathing, tingling, dizziness, and wheezing  Anaphylaxis is a sudden, life-threatening reaction that needs immediate treatment  Call 911 for signs or symptoms of anaphylaxis,  such as trouble breathing, swelling in your mouth or throat, or wheezing  You may also have itching, a rash, hives, or feel like you are going to faint  Seek care immediately if:   · You are stung on your tongue or in your throat  · A white area forms around the bite  · You are sweating badly or have body pain  · You think you were bitten or stung by a poisonous insect  Contact your healthcare provider if:   · You have a fever  · The area becomes red, warm, tender, and swollen beyond the area of the bite or sting  · You have questions or concerns about your condition or care  Steps to take for signs or symptoms of anaphylaxis:   · Immediately  give 1 shot of epinephrine only into the outer thigh muscle  · Leave the shot in place  as directed  Your healthcare provider may recommend you leave it in place for up to 10 seconds before you remove it  This helps make sure all of the epinephrine is delivered  · Call 911 and go to the emergency department,  even if the shot improved symptoms  Do not drive yourself  Bring the used epinephrine shot with you    Treatment  depends on how severe your symptoms are and if you had anaphylaxis before  You may need any of the following:  · Antihistamines  decrease mild symptoms such as itching and rash  · Epinephrine  is medicine used to treat severe allergic reactions such as anaphylaxis  If an insect bites or stings you:   · Remove the stinger  Scrape the stinger out with your fingernail, edge of a credit card, or a knife blade  Do not squeeze the wound  Gently wash the area with soap and water  · Remove the tick  Ticks must be removed as soon as possible so you do not get diseases passed through tick bites  Ask your healthcare provider for more information on tick bites and how to remove ticks  Care for your bite or sting wound:   · Elevate the affected area  Prop the wound above the level of your heart, if possible  Elevate the area for 10 to 20 minutes each hour or as directed by your healthcare provider  · Apply compresses  Soak a clean washcloth in cold water, wring it out, and put it on the bite or sting  Use the compress for 10 to 20 minutes each hour or as directed by your healthcare provider  After 24 to 48 hours, change to warm compresses  · Apply a baking soda paste  Add water to baking soda to make a thick paste  Put the paste on the area for 5 minutes  Rinse gently to remove the paste  Safety precautions to take if you are at risk for anaphylaxis:   · Keep 2 shots of epinephrine with you at all times  You may need a second shot, because epinephrine only works for about 20 minutes and symptoms may return  Your healthcare provider can show you and family members how to give the shot  Check the expiration date every month and replace it before it expires  · Create an action plan  Your healthcare provider can help you create a written plan that explains the allergy and an emergency plan to treat a reaction   The plan explains when to give a second epinephrine shot if symptoms return or do not improve after the first  Give copies of the action plan and emergency instructions to family members, work and school staff, and  providers  Show them how to give a shot of epinephrine  · Carry medical alert identification  Wear medical alert jewelry or carry a card that says you have an insect allergy  Ask your healthcare provider where to get these items  Prevent an insect bite or sting:   · Do not wear bright-colored or flower-print clothing when you plan to spend time outdoors  Do not use hairspray, perfumes, or aftershave  · Do not leave food out  · Empty any standing water  Wash containers with soap and water every 2 days  · Put screens on all open windows and doors  · Put insect repellent that contains DEET on skin that is showing when you go outside  Put insect repellent at the top of your boots, bottom of pant legs, and sleeve cuffs  Wear long sleeves, pants, and shoes  · Use citronella candles outdoors to help keep mosquitoes away  Put a tick and flea collar on pets  Follow up with your healthcare provider as directed:  Write down your questions so you remember to ask them during your visits  © 2017 [facility] Information is for End User's use only and may not be sold, redistributed or otherwise used for commercial purposes  All illustrations and images included in CareNotes® are the copyrighted property of [employer] [facility] [employer] [facility] [employer]  or [employer]  The above information is an  only  It is not intended as medical advice for individual conditions or treatments  Talk to your doctor, nurse or pharmacist before following any medical regimen to see if it is safe and effective for you

## 2018-08-15 NOTE — PROGRESS NOTES
[facility]        NAME: Leida Barton is a 70 y o  female  : 1950    MRN: [id number]  DATE: August 15, 2018  TIME: 7:16 PM    Assessment and Plan   Yellow jacket sting, undetermined intent, initial encounter [T63 [id number]]  1  Yellow jacket sting, undetermined intent, initial encounter  hydrocortisone 2 5 % cream         Patient Instructions     Use cream as directed  Benadryl as needed for itching  Follow up with PCP in 3-5 days  Proceed to  ER if symptoms worsen  Chief Complaint     Chief Complaint   Patient presents with   Marcia Kramer 82     patient reported she was trimming a vine and got stung x 6 by yellow jackets  some redness and swelling noted to areas  areas burn, no difficulty breathing   Unaware if allergic to bee stings? History of Present Illness       69-year-old female presents with bee stings  Patient reports she was out doing some yd work and disturbed a yellow jackets Nest and a attacked her  She reports she got bit 6 times  She was stung once in each arm and 3 times onto her head  No chest pain or shortness of breath  No swelling of lips tongue or throat  No difficulties with breathing  Insect Bite   This is a new problem         Review of Systems   Review of Systems      Current Medications       Current Outpatient Prescriptions:     amLODIPine (NORVASC) 2 5 mg tablet, amlodipine 2 5 mg tablet, Disp: , Rfl:     Cholecalciferol (VITAMIN D3) 1000 UNIT/SPRAY LIQD, Vitamin D3, Disp: , Rfl:     levothyroxine 100 mcg tablet, take 1 tablet by mouth once daily, Disp: 90 tablet, Rfl: 1    hydrocortisone 2 5 % cream, Apply topically 2 (two) times a day, Disp: 30 g, Rfl: 0    Levothyroxine Sodium 100 MCG CAPS, levothyroxine 100 mcg capsule, Disp: , Rfl:     omeprazole (PriLOSEC) 10 mg delayed release capsule, Take 1 capsule (10 mg total) by mouth 2 (two) times a day (Patient not taking: Reported on 8/15/2018 ), Disp: 180 capsule, Rfl: 0    traMADol (ULTRAM) 50 mg tablet, Take 1 tablet every 4-6 hours by oral route as needed  , Disp: , Rfl:     Current Allergies     Allergies as of 08/15/2018 - Reviewed 08/15/2018   Allergen Reaction Noted    Aciphex [rabeprazole] Itching, Chest Pain, Shortness Of Breath, and GI Intolerance 07/02/2014    Benzonatate Other (See Comments), Chest Pain, and Shortness Of Breath 07/02/2014    Biaxin [clarithromycin] Other (See Comments), Chest Pain, and Nausea Only 04/22/2012    Doxycycline GI Intolerance, Chest Pain, Itching, and Shortness Of Breath 07/02/2014    Avelox [moxifloxacin] GI Intolerance, Chest Pain, and Nausea Only 04/22/2012    Co q 10 [coenzyme q10]  12/20/2016    Codeine GI Intolerance, Chest Pain, and Nausea Only 04/22/2012    Metoclopramide GI Intolerance 02/08/2016    Nizatidine Other (See Comments), Chest Pain, Nausea Only, Vomiting, and GI Intolerance 07/22/2015    Oxycodone GI Intolerance 10/31/2016    Oxycodone-acetaminophen Chest Pain and Nausea Only 04/22/2012    Plecanatide  08/23/2017    Pravastatin Other (See Comments), Headache, and Nausea Only 08/09/2016    Ranitidine Other (See Comments), Chest Pain, and GI Intolerance 02/08/2016    Ubidecarenone GI Intolerance 08/09/2016    Caffeine Other (See Comments) and Headache 04/29/2013    Chlorhexidine Rash 07/02/2014    Latex Itching and Rash 02/08/2016    Levaquin [levofloxacin] Rash, Chest Pain, Nausea Only, Other (See Comments), and GI Intolerance 04/22/2012    Other Rash and Edema 04/22/2012    Sulfa antibiotics Rash             The following portions of the patient's history were reviewed and updated as appropriate: allergies, current medications, past family history, past medical history, past social history, past surgical history and problem list      Past Medical History:   Diagnosis Date    Arthritis     OA, bilateral resolved 08/09/2016    Balance disorder     resolved 01/06/2017    Chronic constipation     resolved 01/06/2017    Chronic headaches     Colon polyps     Depression     Dermatitis     resolved 01/06/2017    Esophageal reflux     resolved 01/06/2017    Fibromyalgia     Frequent headaches     resolved 01/06/2017    GERD (gastroesophageal reflux disease)     symptomatic-for EGD today 10/31/2016    Heartburn     Herniated lumbar intervertebral disc     Hyperlipidemia     Hypertension     last assessed 08/04/2017    Hypothyroid     IBS (irritable bowel syndrome)     Incontinence     Mobility impaired     uses cane -needs R  TKR    Osteoarthritis, knee     bilateral resolved 01/06/2017    Stress incontinence     resolved 01/06/2017    SAVANNAH (stress urinary incontinence, female)     Trouble swallowing     Varicose veins of bilateral lower extremities with pain     wears support hose    Varicose veins of lower extremity     both with pain resolved 01/06/2017    Wears dentures     full upper   partial lower    Wears glasses        Past Surgical History:   Procedure Laterality Date    ABDOMINAL SURGERY      laparoscopy    BLADDER SURGERY      stimulator then removal    BREAST SURGERY  09/29/2009    R breast-lumpectomy w/ lymph nodes, 9/2/09 lymph node resection right breast 1985 b/l breast surgery resolved 09/02/2009    COLONOSCOPY      DIAGNOSTIC LAPAROSCOPY      ESOPHAGOGASTRODUODENOSCOPY      HYSTERECTOMY      KEVEN    INCISIONAL BREAST BIOPSY      JOINT REPLACEMENT      L TKR    KNEE SURGERY      left knee 1/28/05 and 1994 right knee 7/22/05, resolved 01/28/2005    NECK SURGERY  1997    lymph node resection     NOSE SURGERY      resolved 10/01/2008    FL ESOPHAGOGASTRODUODENOSCOPY TRANSORAL DIAGNOSTIC N/A 12/21/2016    Procedure: ESOPHAGOGASTRODUODENOSCOPY (EGD); Surgeon: Qamar Weeks DO;  Location: [facility]; Service: Gastroenterology    FL ESOPHAGOGASTRODUODENOSCOPY TRANSORAL DIAGNOSTIC N/A 10/31/2016    Procedure: ESOPHAGOGASTRODUODENOSCOPY (EGD);   Surgeon: Qamar Weeks DO;  Location: [facility]; Service: Gastroenterology    SALPINGOOPHORECTOMY Bilateral        Family History   Problem Relation Age of Onset    Breast cancer Mother     Stomach cancer Family     Colon cancer Family     Heart failure Family     Diabetes Family     Hypertension Family     Thyroid disease unspecified Family         disorder         Medications have been verified  Objective   /84   Pulse 75   Temp 98 8 °F (37 1 °C)   Resp 20   Ht 5' 5" (1 651 m)   Wt 91 9 kg (202 lb 9 6 oz)   SpO2 95%   BMI 33 71 kg/m²        Physical Exam     Physical Exam   Constitutional: She is oriented to person, place, and time  She appears well-developed and well-nourished  No distress  HENT:   Head: Normocephalic and atraumatic  Right Ear: External ear normal    Left Ear: External ear normal    Nose: Nose normal    Mouth/Throat: Oropharynx is clear and moist  No oropharyngeal exudate  Eyes: Conjunctivae are normal  Right eye exhibits no discharge  Left eye exhibits no discharge  Neck: Normal range of motion  Neck supple  Cardiovascular: Normal rate, regular rhythm, normal heart sounds and intact distal pulses  No murmur heard  Pulmonary/Chest: Effort normal and breath sounds normal  No respiratory distress  She has no wheezes  She has no rales  Abdominal: Soft  Bowel sounds are normal  There is no tenderness  Musculoskeletal: Normal range of motion  Lymphadenopathy:     She has no cervical adenopathy  Neurological: She is alert and oriented to person, place, and time  Skin: Skin is warm and dry  There is erythema (Mild erythema noted from stings  Patient was stung on left cheek left ear left parietal on scalp left forearm and right forearm  )  Psychiatric: She has a normal mood and affect  Nursing note and vitals reviewed

## 2018-08-31 ENCOUNTER — APPOINTMENT (EMERGENCY)
Dept: RADIOLOGY | Facility: HOSPITAL | Age: 68
End: 2018-08-31
Payer: COMMERCIAL

## 2018-08-31 ENCOUNTER — OFFICE VISIT (OUTPATIENT)
Dept: URGENT CARE | Age: 68
End: 2018-08-31
Payer: COMMERCIAL

## 2018-08-31 ENCOUNTER — HOSPITAL ENCOUNTER (OUTPATIENT)
Facility: HOSPITAL | Age: 68
Setting detail: OBSERVATION
Discharge: HOME | End: 2018-09-01
Attending: EMERGENCY MEDICINE | Admitting: INTERNAL MEDICINE
Payer: COMMERCIAL

## 2018-08-31 VITALS
SYSTOLIC BLOOD PRESSURE: 134 MMHG | DIASTOLIC BLOOD PRESSURE: 87 MMHG | TEMPERATURE: 98.5 F | RESPIRATION RATE: 16 BRPM | HEART RATE: 65 BPM | HEIGHT: 65 IN | WEIGHT: 201 LBS | OXYGEN SATURATION: 98 % | BODY MASS INDEX: 33.49 KG/M2

## 2018-08-31 DIAGNOSIS — R07.9 CHEST PAIN, UNSPECIFIED TYPE: Primary | ICD-10-CM

## 2018-08-31 DIAGNOSIS — K21.9 GASTROESOPHAGEAL REFLUX DISEASE: ICD-10-CM

## 2018-08-31 DIAGNOSIS — R07.9 CHEST PAIN: Primary | ICD-10-CM

## 2018-08-31 LAB
ALBUMIN SERPL BCP-MCNC: 3.7 G/DL (ref 3.5–5)
ALP SERPL-CCNC: 108 U/L (ref 46–116)
ALT SERPL W P-5'-P-CCNC: 23 U/L (ref 12–78)
ANION GAP SERPL CALCULATED.3IONS-SCNC: 10 MMOL/L (ref 4–13)
AST SERPL W P-5'-P-CCNC: 19 U/L (ref 5–45)
BASOPHILS # BLD AUTO: 0.1 THOUSANDS/ΜL (ref 0–0.1)
BASOPHILS NFR BLD AUTO: 1 % (ref 0–1)
BILIRUB SERPL-MCNC: 0.4 MG/DL (ref 0.2–1)
BUN SERPL-MCNC: 11 MG/DL (ref 5–25)
CALCIUM SERPL-MCNC: 9.7 MG/DL (ref 8.3–10.1)
CHLORIDE SERPL-SCNC: 105 MMOL/L (ref 100–108)
CO2 SERPL-SCNC: 29 MMOL/L (ref 21–32)
CREAT SERPL-MCNC: 1.07 MG/DL (ref 0.6–1.3)
EOSINOPHIL # BLD AUTO: 0.16 THOUSAND/ΜL (ref 0–0.61)
EOSINOPHIL NFR BLD AUTO: 2 % (ref 0–6)
ERYTHROCYTE [DISTWIDTH] IN BLOOD BY AUTOMATED COUNT: 13.1 % (ref 11.6–15.1)
GFR SERPL CREATININE-BSD FRML MDRD: 53 ML/MIN/1.73SQ M
GLUCOSE SERPL-MCNC: 99 MG/DL (ref 65–140)
HCT VFR BLD AUTO: 45.1 % (ref 34.8–46.1)
HGB BLD-MCNC: 14.7 G/DL (ref 11.5–15.4)
IMM GRANULOCYTES # BLD AUTO: 0.01 THOUSAND/UL (ref 0–0.2)
IMM GRANULOCYTES NFR BLD AUTO: 0 % (ref 0–2)
LYMPHOCYTES # BLD AUTO: 2.45 THOUSANDS/ΜL (ref 0.6–4.47)
LYMPHOCYTES NFR BLD AUTO: 35 % (ref 14–44)
MCH RBC QN AUTO: 32.3 PG (ref 26.8–34.3)
MCHC RBC AUTO-ENTMCNC: 32.6 G/DL (ref 31.4–37.4)
MCV RBC AUTO: 99 FL (ref 82–98)
MONOCYTES # BLD AUTO: 0.57 THOUSAND/ΜL (ref 0.17–1.22)
MONOCYTES NFR BLD AUTO: 8 % (ref 4–12)
NEUTROPHILS # BLD AUTO: 3.76 THOUSANDS/ΜL (ref 1.85–7.62)
NEUTS SEG NFR BLD AUTO: 54 % (ref 43–75)
NRBC BLD AUTO-RTO: 0 /100 WBCS
PLATELET # BLD AUTO: 257 THOUSANDS/UL (ref 149–390)
PMV BLD AUTO: 10.5 FL (ref 8.9–12.7)
POTASSIUM SERPL-SCNC: 3.6 MMOL/L (ref 3.5–5.3)
PROT SERPL-MCNC: 8.1 G/DL (ref 6.4–8.2)
RBC # BLD AUTO: 4.55 MILLION/UL (ref 3.81–5.12)
SODIUM SERPL-SCNC: 144 MMOL/L (ref 136–145)
TROPONIN I SERPL-MCNC: <0.02 NG/ML
TROPONIN I SERPL-MCNC: <0.02 NG/ML
WBC # BLD AUTO: 7.05 THOUSAND/UL (ref 4.31–10.16)

## 2018-08-31 PROCEDURE — 99213 OFFICE O/P EST LOW 20 MIN: CPT | Performed by: PHYSICIAN ASSISTANT

## 2018-08-31 PROCEDURE — 93005 ELECTROCARDIOGRAM TRACING: CPT | Performed by: PHYSICIAN ASSISTANT

## 2018-08-31 PROCEDURE — 36415 COLL VENOUS BLD VENIPUNCTURE: CPT | Performed by: EMERGENCY MEDICINE

## 2018-08-31 PROCEDURE — 99219 PR INITIAL OBSERVATION CARE/DAY 50 MINUTES: CPT | Performed by: INTERNAL MEDICINE

## 2018-08-31 PROCEDURE — 84484 ASSAY OF TROPONIN QUANT: CPT | Performed by: EMERGENCY MEDICINE

## 2018-08-31 PROCEDURE — 85025 COMPLETE CBC W/AUTO DIFF WBC: CPT | Performed by: EMERGENCY MEDICINE

## 2018-08-31 PROCEDURE — 80053 COMPREHEN METABOLIC PANEL: CPT | Performed by: EMERGENCY MEDICINE

## 2018-08-31 PROCEDURE — 93005 ELECTROCARDIOGRAM TRACING: CPT

## 2018-08-31 PROCEDURE — 99285 EMERGENCY DEPT VISIT HI MDM: CPT

## 2018-08-31 PROCEDURE — 71046 X-RAY EXAM CHEST 2 VIEWS: CPT

## 2018-08-31 PROCEDURE — 84484 ASSAY OF TROPONIN QUANT: CPT | Performed by: INTERNAL MEDICINE

## 2018-08-31 RX ORDER — FAMOTIDINE 20 MG/1
20 TABLET, FILM COATED ORAL EVERY OTHER DAY
Status: DISCONTINUED | OUTPATIENT
Start: 2018-08-31 | End: 2018-09-01 | Stop reason: HOSPADM

## 2018-08-31 RX ORDER — LEVOTHYROXINE SODIUM 0.1 MG/1
100 TABLET ORAL
Status: DISCONTINUED | OUTPATIENT
Start: 2018-09-01 | End: 2018-09-01 | Stop reason: HOSPADM

## 2018-08-31 RX ORDER — ONDANSETRON 2 MG/ML
4 INJECTION INTRAMUSCULAR; INTRAVENOUS EVERY 6 HOURS PRN
Status: DISCONTINUED | OUTPATIENT
Start: 2018-08-31 | End: 2018-09-01 | Stop reason: HOSPADM

## 2018-08-31 RX ORDER — AMLODIPINE BESYLATE 2.5 MG/1
2.5 TABLET ORAL DAILY
Status: DISCONTINUED | OUTPATIENT
Start: 2018-08-31 | End: 2018-09-01 | Stop reason: HOSPADM

## 2018-08-31 RX ORDER — ASPIRIN 81 MG/1
324 TABLET, CHEWABLE ORAL ONCE
Status: COMPLETED | OUTPATIENT
Start: 2018-08-31 | End: 2018-08-31

## 2018-08-31 RX ADMIN — AMLODIPINE BESYLATE 2.5 MG: 2.5 TABLET ORAL at 23:04

## 2018-08-31 RX ADMIN — ASPIRIN 81 MG 324 MG: 81 TABLET ORAL at 17:49

## 2018-08-31 RX ADMIN — FAMOTIDINE 20 MG: 20 TABLET ORAL at 21:46

## 2018-08-31 NOTE — ED PROVIDER NOTES
History  Chief Complaint   Patient presents with    Chest Pain     pt went to [facility] now today for chest pressure ongoing since thursaday with some nausea  did an EKG and told it looked a little abnormal and sent to ED by private vehicle for further evaluation  HPI  Patient is a 57-year-old woman with a past medical history of high blood pressure who presents for evaluation of central chest pressure  Symptoms started Wednesday evening, central chest pressure as well as epigastric  Did not radiate anywhere, was not associated with any presyncope or syncope  Was not associated with any diaphoresis, palpitations  Patient does endorse dizziness upon exertion but denies pain with exertion  This has been ongoing for weeks  Denies any dark tarry stools  Patient states he has never had a MI  Patient was evaluated by Dr Ramsey Weaver of Cardiology with a stress test last year  Per patient report, she has a blockage in 1 of her arteries but has had no further workup  Patient otherwise denies headache neck pain, denies fevers chills  Patient does endorse a long history of stomach pain problems which patient states is irritable bowels "    Prior to Admission Medications   Prescriptions Last Dose Informant Patient Reported? Taking?    Cholecalciferol (VITAMIN D3) 1000 UNIT/SPRAY LIQD   Yes No   Sig: Vitamin D3   amLODIPine (NORVASC) 2 5 mg tablet 8/30/2018 at 2100  Yes No   Sig: amlodipine 2 5 mg tablet   hydrocortisone 2 5 % cream   No No   Sig: Apply topically 2 (two) times a day   Patient not taking: Reported on 8/31/2018    levothyroxine 100 mcg tablet   No No   Sig: take 1 tablet by mouth once daily      Facility-Administered Medications: None       Past Medical History:   Diagnosis Date    Arthritis     OA, bilateral resolved 08/09/2016    Balance disorder     resolved 01/06/2017    Chronic constipation     resolved 01/06/2017    Chronic headaches     Colon polyps     Depression     Dermatitis resolved 01/06/2017    Esophageal reflux     resolved 01/06/2017    Fibromyalgia     Frequent headaches     resolved 01/06/2017    GERD (gastroesophageal reflux disease)     symptomatic-for EGD today 10/31/2016    Heartburn     Herniated lumbar intervertebral disc     Hyperlipidemia     Hypertension     last assessed 08/04/2017    Hypothyroid     IBS (irritable bowel syndrome)     Incontinence     Mobility impaired     uses cane -needs R  TKR    Osteoarthritis, knee     bilateral resolved 01/06/2017    Stress incontinence     resolved 01/06/2017    SAVANNAH (stress urinary incontinence, female)     Trouble swallowing     Varicose veins of bilateral lower extremities with pain     wears support hose    Varicose veins of lower extremity     both with pain resolved 01/06/2017    Wears dentures     full upper   partial lower    Wears glasses        Past Surgical History:   Procedure Laterality Date    ABDOMINAL SURGERY      laparoscopy    BLADDER SURGERY      stimulator then removal    BREAST SURGERY  09/29/2009    R breast-lumpectomy w/ lymph nodes, 9/2/09 lymph node resection right breast 1985 b/l breast surgery resolved 09/02/2009    COLONOSCOPY      DIAGNOSTIC LAPAROSCOPY      ESOPHAGOGASTRODUODENOSCOPY      HYSTERECTOMY      KEVEN    INCISIONAL BREAST BIOPSY      JOINT REPLACEMENT      L TKR    KNEE SURGERY      left knee 1/28/05 and 1994 right knee 7/22/05, resolved 01/28/2005    NECK SURGERY  1997    lymph node resection     NOSE SURGERY      resolved 10/01/2008    [place] ESOPHAGOGASTRODUODENOSCOPY TRANSORAL DIAGNOSTIC N/A 12/21/2016    Procedure: ESOPHAGOGASTRODUODENOSCOPY (EGD); Surgeon: Jared Tracy DO;  [facility]; Service: Gastroenterology    [place] ESOPHAGOGASTRODUODENOSCOPY TRANSORAL DIAGNOSTIC N/A 10/31/2016    Procedure: ESOPHAGOGASTRODUODENOSCOPY (EGD); Surgeon: Jared Tracy DO;  Location[facility];   Service: Gastroenterology    SALPINGOOPHORECTOMY Bilateral Family History   Problem Relation Age of Onset    Breast cancer Mother     Stomach cancer Family     Colon cancer Family     Heart failure Family     Diabetes Family     Hypertension Family     Thyroid disease unspecified Family         disorder     I have reviewed and agree with the history as documented  Social History   Substance Use Topics    Smoking status: Never Smoker    Smokeless tobacco: Never Used    Alcohol use No        Review of Systems   Constitutional: Negative  HENT: Negative  Eyes: Negative  Respiratory: Positive for chest tightness  Cardiovascular: Positive for chest pain  Gastrointestinal: Positive for abdominal pain  Endocrine: Negative  Genitourinary: Negative  Musculoskeletal: Negative  Skin: Negative  Allergic/Immunologic: Negative  Neurological: Negative  Hematological: Negative  Psychiatric/Behavioral: Negative  Physical Exam  ED Triage Vitals   Temperature Pulse Respirations Blood Pressure SpO2   08/31/18 1547 08/31/18 1547 08/31/18 1547 08/31/18 1547 08/31/18 1547   98 1 °F (36 7 °C) 61 16 126/60 98 %      Temp Source Heart Rate Source Patient Position - Orthostatic VS BP Location FiO2 (%)   08/31/18 1547 08/31/18 1915 08/31/18 1915 08/31/18 1915 --   Oral Monitor Lying Right arm       Pain Score       08/31/18 1547       No Pain           Orthostatic Vital Signs  Vitals:    08/31/18 1915 08/31/18 1947 08/31/18 2349 09/01/18 0754   BP: 154/69 147/86 119/60 137/81   Pulse: 66 66 57 58   Patient Position - Orthostatic VS: Lying Sitting Lying Lying       Physical Exam   Constitutional: She is oriented to person, place, and time  She appears well-developed and well-nourished  No distress  HENT:   Head: Normocephalic and atraumatic     Right Ear: External ear normal    Left Ear: External ear normal    Mouth/Throat: Oropharynx is clear and moist    Eyes: Conjunctivae and EOM are normal  Pupils are equal, round, and reactive to light  Right eye exhibits no discharge  Left eye exhibits no discharge  No scleral icterus  Neck: Normal range of motion  Neck supple  No tracheal deviation present  No thyromegaly present  Cardiovascular: Normal rate, regular rhythm and intact distal pulses  Exam reveals no gallop and no friction rub  No murmur heard  Pulmonary/Chest: Effort normal and breath sounds normal  No stridor  No respiratory distress  She has no wheezes  She has no rales  Abdominal: Soft  Bowel sounds are normal  She exhibits no distension  There is no tenderness  There is no rebound and no guarding  Musculoskeletal: Normal range of motion  She exhibits no edema or deformity  Neurological: She is alert and oriented to person, place, and time  No cranial nerve deficit  Skin: Skin is warm and dry  No rash noted  She is not diaphoretic  No erythema  Psychiatric: She has a normal mood and affect  Her behavior is normal  Thought content normal    Nursing note and vitals reviewed        ED Medications  Medications   aspirin chewable tablet 324 mg (324 mg Oral Given 8/31/18 3859)       Diagnostic Studies  Results Reviewed     Procedure Component Value Units Date/Time    Troponin I [[id number]]  (Normal) Collected:  08/31/18 1634    Lab Status:  Final result Specimen:  Blood from Arm, Left Updated:  08/31/18 1659     Troponin I <0 02 ng/mL     Comprehensive metabolic panel [[id number]] Collected:  08/31/18 1634    Lab Status:  Final result Specimen:  Blood from Arm, Left Updated:  08/31/18 1656     Sodium 144 mmol/L      Potassium 3 6 mmol/L      Chloride 105 mmol/L      CO2 29 mmol/L      ANION GAP 10 mmol/L      BUN 11 mg/dL      Creatinine 1 07 mg/dL      Glucose 99 mg/dL      Calcium 9 7 mg/dL      AST 19 U/L      ALT 23 U/L      Alkaline Phosphatase 108 U/L      Total Protein 8 1 g/dL      Albumin 3 7 g/dL      Total Bilirubin 0 40 mg/dL      eGFR 53 ml/min/1 73sq m     Narrative:         National Kidney Disease Education Program recommendations are as follows:  GFR calculation is accurate only with a steady state creatinine  Chronic Kidney disease less than 60 ml/min/1 73 sq  meters  Kidney failure less than 15 ml/min/1 73 sq  meters  CBC and differential [[id number]]  (Abnormal) Collected:  08/31/18 1634    Lab Status:  Final result Specimen:  Blood from Arm, Left Updated:  08/31/18 1643     WBC 7 05 Thousand/uL      RBC 4 55 Million/uL      Hemoglobin 14 7 g/dL      Hematocrit 45 1 %      MCV 99 (H) fL      MCH 32 3 pg      MCHC 32 6 g/dL      RDW 13 1 %      MPV 10 5 fL      Platelets 333 Thousands/uL      nRBC 0 /100 WBCs      Neutrophils Relative 54 %      Immat GRANS % 0 %      Lymphocytes Relative 35 %      Monocytes Relative 8 %      Eosinophils Relative 2 %      Basophils Relative 1 %      Neutrophils Absolute 3 76 Thousands/µL      Immature Grans Absolute 0 01 Thousand/uL      Lymphocytes Absolute 2 45 Thousands/µL      Monocytes Absolute 0 57 Thousand/µL      Eosinophils Absolute 0 16 Thousand/µL      Basophils Absolute 0 10 Thousands/µL                  XR chest 2 views   Final Result by Santo Srivastava MD (08/31 1637)      No acute cardiopulmonary disease              Workstation performed: [id number]               Procedures  Procedures      Phone Consults  ED Phone Contact    ED Course         HEART Risk Score      Most Recent Value   History  1 Filed at: 08/31/2018 1806   ECG  1 Filed at: 08/31/2018 1806   Age  2 Filed at: 08/31/2018 1806   Risk Factors  1 Filed at: 08/31/2018 1806   Troponin  0 Filed at: 08/31/2018 1806   Heart Score Risk Calculator   History  1 Filed at: 08/31/2018 1806   ECG  1 Filed at: 08/31/2018 1806   Age  2 Filed at: 08/31/2018 1806   Risk Factors  1 Filed at: 08/31/2018 1806   Troponin  0 Filed at: 08/31/2018 1806   HEART Score  5 Filed at: 08/31/2018 1806   HEART Score  5 Filed at: 08/31/2018 1806                            MDM  Number of Diagnoses or Management Options  Chest pain: Diagnosis management comments: 59-year-old woman presents with epigastric and central chest pain and pressure  Will do a full cardiac workup  Given her age and comorbidities, likely admission to the hospital     CritCare Time    Disposition  Final diagnoses:   Chest pain     Time reflects when diagnosis was documented in both MDM as applicable and the Disposition within this note     Time User Action Codes Description Comment    8/31/2018  6:26 PM Fly Dozierjennifer Add [R07 9] Chest pain     9/1/2018  9:25 AM Heidi Haque Add [K21 9] Gastroesophageal reflux disease       ED Disposition     ED Disposition Condition Comment    Admit  Admitted to IM, observation, telemetry        Follow-up Information     Follow up With Specialties Details Why Contact Info    Donnie Hagen, DO Family Medicine Follow up  [address]      Selestine Rides, DO Cardiology Follow up  [address] [facility] [address] [facility]  [phone]            Discharge Medication List as of 9/1/2018 10:25 AM      START taking these medications    Details   famotidine (PEPCID) 20 mg tablet Take 1 tablet (20 mg total) by mouth every other day, Starting Sun 9/2/2018, Print         CONTINUE these medications which have NOT CHANGED    Details   amLODIPine (NORVASC) 2 5 mg tablet amlodipine 2 5 mg tablet, Historical Med      Cholecalciferol (VITAMIN D3) 1000 UNIT/SPRAY LIQD Vitamin D3, Historical Med      levothyroxine 100 mcg tablet take 1 tablet by mouth once daily, Normal         STOP taking these medications       hydrocortisone 2 5 % cream Comments:   Reason for Stopping:               Outpatient Discharge Orders  Discharge Diet     NM Myocardial Perfusion Spect (Pharmacological Induced Stress and/or Rest)   Standing Status: Future  Standing Exp  Date: 09/01/22         ED Provider  Attending physically available and evaluated June [address] No Chino Myers managed the patient along with the ED Attending      Electronically Signed by         Ngozi Hart MD  09/05/18 0102

## 2018-08-31 NOTE — H&P
History and Physical - [facility]    Patient Information: Leida Stafford 70 y o  female MRN: [id number]  Unit/Bed#: ED 21 Encounter: [id number]  Admitting Physician: Hero Hunt DO  PCP: Deandre Cho DO  Date of Admission:  08/31/18    Assessment/Plan:  1  Chest pain- atypical  Likely related to hiatal hernia  Will cycle troponins  Monitor on tele  Could obtain stress test outpt if work up is negative and due to the LBBB    2  Hiatal hernia with gerd- will try pepcid  Possibly every other day  Follow up with GI outpt  3  Hypothyroid- continue synthroid    4  htn- continue norvasc    VTE Prophylaxis:low risk, early ambulation  Code Status: full code    Anticipated Length of Stay:  Patient will be admitted on an Observation basis with an anticipated length of stay of  less than 2 midnights  Chief Complaint:   Chest pain    History of Present Illness:    Leida Coleman is a 70 y o  female who presents with mid thoracic/epigastric pain  She has a history of a hiatal hernia and was on omeprazole until June of this year  She states she was getting nauseated on this medication so her gi doctor d/valerie it  She reports she has tried "all of the medications" for her hiatal hernia and every single medication makes her ill  She reports that for about 2 weeks she has been getting dizzy  Its intermittent and resolves quickly  She also gets pain in her upper abd at times in which she has to get up and walk around  It finally goes away after she does that  Since Wednesday she has been getting chest pressure in the her chest/epigastric region  It does not correlate with food or with activity  It does not radiate  She does not have diaphoresis, vomiting, or sob  No f/c no cp     Review of Systems:    Review of Systems   Constitutional: Negative  HENT: Negative  Eyes: Negative  Respiratory: Negative  Cardiovascular: Positive for chest pain  Gastrointestinal: Negative  Endocrine: Negative  Genitourinary: Negative  Musculoskeletal: Negative  Skin: Negative  Allergic/Immunologic: Negative  Neurological: Positive for dizziness  Hematological: Negative  Psychiatric/Behavioral: Negative          Past Medical and Surgical History:     Past Medical History:   Diagnosis Date    Arthritis     OA, bilateral resolved 08/09/2016    Balance disorder     resolved 01/06/2017    Chronic constipation     resolved 01/06/2017    Chronic headaches     Colon polyps     Depression     Dermatitis     resolved 01/06/2017    Esophageal reflux     resolved 01/06/2017    Fibromyalgia     Frequent headaches     resolved 01/06/2017    GERD (gastroesophageal reflux disease)     symptomatic-for EGD today 10/31/2016    Heartburn     Herniated lumbar intervertebral disc     Hyperlipidemia     Hypertension     last assessed 08/04/2017    Hypothyroid     IBS (irritable bowel syndrome)     Incontinence     Mobility impaired     uses cane -needs R  TKR    Osteoarthritis, knee     bilateral resolved 01/06/2017    Stress incontinence     resolved 01/06/2017    SAVANNAH (stress urinary incontinence, female)     Trouble swallowing     Varicose veins of bilateral lower extremities with pain     wears support hose    Varicose veins of lower extremity     both with pain resolved 01/06/2017    Wears dentures     full upper   partial lower    Wears glasses        Past Surgical History:   Procedure Laterality Date    ABDOMINAL SURGERY      laparoscopy    BLADDER SURGERY      stimulator then removal    BREAST SURGERY  09/29/2009    R breast-lumpectomy w/ lymph nodes, 9/2/09 lymph node resection right breast 1985 b/l breast surgery resolved 09/02/2009    COLONOSCOPY      DIAGNOSTIC LAPAROSCOPY      ESOPHAGOGASTRODUODENOSCOPY      HYSTERECTOMY      KEVEN    INCISIONAL BREAST BIOPSY      JOINT REPLACEMENT      L TKR    KNEE SURGERY      left knee 1/28/05 and 1994 right knee 7/22/05, resolved 01/28/2005  NECK SURGERY  1997    lymph node resection     NOSE SURGERY      resolved 10/01/2008    [place] ESOPHAGOGASTRODUODENOSCOPY TRANSORAL DIAGNOSTIC N/A 12/21/2016    Procedure: ESOPHAGOGASTRODUODENOSCOPY (EGD); Surgeon: Kristyn Dugan DO;  [facility]; Service: Gastroenterology    [place] ESOPHAGOGASTRODUODENOSCOPY TRANSORAL DIAGNOSTIC N/A 10/31/2016    Procedure: ESOPHAGOGASTRODUODENOSCOPY (EGD); Surgeon: Kristyn Dugan DO;  [facility]; Service: Gastroenterology    SALPINGOOPHORECTOMY Bilateral        Meds/Allergies:    Prior to Admission medications    Medication Sig Start Date End Date Taking? Authorizing Provider   amLODIPine (NORVASC) 2 5 mg tablet amlodipine 2 5 mg tablet    Historical Provider, MD   Cholecalciferol (VITAMIN D3) 1000 UNIT/SPRAY LIQD Vitamin D3    Historical Provider, MD   hydrocortisone 2 5 % cream Apply topically 2 (two) times a day  Patient not taking: Reported on 8/31/2018  8/15/18   Myles Gallardo PA-C   levothyroxine 100 mcg tablet take 1 tablet by mouth once daily 6/27/18   Sara Medrano MD   Levothyroxine Sodium 100 MCG CAPS levothyroxine 100 mcg capsule  8/31/18  Historical Provider, MD   omeprazole (PriLOSEC) 10 mg delayed release capsule Take 1 capsule (10 mg total) by mouth 2 (two) times a day  Patient not taking: Reported on 8/15/2018  6/6/18 8/31/18  Nancy Willingham DO   traMADol Ilene Madrigal) 50 mg tablet Take 1 tablet every 4-6 hours by oral route as needed  8/31/18  Historical Provider, MD     I have reviewed home medications with patient personally  Allergies:    Allergies   Allergen Reactions    Aciphex [Rabeprazole] Itching, Chest Pain, Shortness Of Breath and GI Intolerance     headache    Benzonatate Other (See Comments), Chest Pain and Shortness Of Breath     Chest pain    Biaxin [Clarithromycin] Other (See Comments), Chest Pain and Nausea Only     Reaction Date: [id number];   Chest pressure    weakness    Doxycycline GI Intolerance, Chest Pain, Itching and Shortness Of Breath    Avelox [Moxifloxacin] GI Intolerance, Chest Pain and Nausea Only     Reaction Date: 08Sep2011;     Co Q 10 [Coenzyme Q10]     Codeine GI Intolerance, Chest Pain and Nausea Only     Reaction Date: 08Sep2011;   weakness chest pain    Metoclopramide GI Intolerance    Nizatidine Other (See Comments), Chest Pain, Nausea Only, Vomiting and GI Intolerance     Nausea   Chest pain    Oxycodone GI Intolerance    Oxycodone-Acetaminophen Chest Pain and Nausea Only     Reaction Date: [id number];     Plecanatide     Pravastatin Other (See Comments), Headache and Nausea Only     headaches    Ranitidine Other (See Comments), Chest Pain and GI Intolerance     chest pain  Arm tingling    Ubidecarenone GI Intolerance    Caffeine Other (See Comments) and Headache     migraines    Chlorhexidine Rash    Latex Itching and Rash     Minor itching to the powder to gloves  Minor itching to the powder to gloves   Levaquin [Levofloxacin] Rash, Chest Pain, Nausea Only, Other (See Comments) and GI Intolerance     Reaction Date: [id number];     Other Rash and Edema     Reaction Date: [id number];   Syncope  Med is amortrpatcl?  An antibiotic  Tape    Sulfa Antibiotics Rash       Social History:     Marital Status:      Substance Use History:   History   Alcohol Use No     History   Smoking Status    Never Smoker   Smokeless Tobacco    Never Used     History   Drug Use No       Family History:    Family History   Problem Relation Age of Onset    Breast cancer Mother     Stomach cancer Family     Colon cancer Family     Heart failure Family     Diabetes Family     Hypertension Family     Thyroid disease unspecified Family         disorder       Physical Exam:     Vitals:   Blood Pressure: 141/75 (08/31/18 1750)  Pulse: 63 (08/31/18 1750)  Temperature: 98 1 °F (36 7 °C) (08/31/18 1547)  Temp Source: Oral (08/31/18 1547)  Respirations: 16 (08/31/18 1750)  Weight - Scale: 90 7 kg (200 lb) (08/31/18 1547)  SpO2: 98 % (08/31/18 1750)    Physical Exam   Constitutional: She is oriented to person, place, and time  No distress  HENT:   Head: Normocephalic and atraumatic  Eyes: EOM are normal  Pupils are equal, round, and reactive to light  Neck: Normal range of motion  Neck supple  Cardiovascular: Normal rate, regular rhythm and normal heart sounds  Exam reveals no gallop and no friction rub  No murmur heard  Pulmonary/Chest: Effort normal and breath sounds normal  No respiratory distress  She has no wheezes  She has no rales  Abdominal: Soft  Bowel sounds are normal  She exhibits no distension  There is no tenderness  There is no rebound  Musculoskeletal: Normal range of motion  Neurological: She is alert and oriented to person, place, and time  Skin: Skin is warm and dry  She is not diaphoretic  Psychiatric: She has a normal mood and affect  Additional Data:     Lab Results: I have personally reviewed pertinent reports  Trop 0 02     Results from last 7 days  Lab Units 08/31/18  1634   WBC Thousand/uL 7 05   HEMOGLOBIN g/dL 14 7   HEMATOCRIT % 45 1   PLATELETS Thousands/uL 257   NEUTROS PCT % 54   LYMPHS PCT % 35   MONOS PCT % 8   EOS PCT % 2       Results from last 7 days  Lab Units 08/31/18  1634   SODIUM mmol/L 144   POTASSIUM mmol/L 3 6   CHLORIDE mmol/L 105   CO2 mmol/L 29   BUN mg/dL 11   CREATININE mg/dL 1 07   CALCIUM mg/dL 9 7   ALK PHOS U/L 108   ALT U/L 23   AST U/L 19           Imaging: I have personally reviewed pertinent reports  Xr Chest 2 Views    Result Date: 8/31/2018  Narrative: CHEST INDICATION:   chest pain  COMPARISON:  August 10, 2010 EXAM PERFORMED/VIEWS:  XR CHEST PA & LATERAL FINDINGS: Cardiomediastinal silhouette appears unremarkable  The lungs are clear  No pneumothorax or pleural effusion  Osseous structures appear within normal limits for patient age  Impression: No acute cardiopulmonary disease   Workstation performed: [id number] EKG, Pathology, and Other Studies Reviewed on Admission:   · EKG: LBBB at 67 bpm  History of this  [facility] Records Reviewed:  Yes

## 2018-08-31 NOTE — ED ATTENDING ATTESTATION
Devika Enriquez MD, saw and evaluated the patient  I have discussed the patient with the resident/non-physician practitioner and agree with the resident's/non-physician practitioner's findings, Plan of Care, and MDM as documented in the resident's/non-physician practitioner's note, except where noted  All available labs and Radiology studies were reviewed  At this point I agree with the current assessment done in the Emergency Department  I have conducted an independent evaluation of this patient a history and physical is as follows:      70year old female presents for evaluation of central chest pressure over the past 3 days  She states it has started initially in the central area of her chest is intermittent in nature and has progressed have epigastric pressure today as well  Associated wit diarrhea and nausea  no v/f/c, cough, uri symtpoms, sob, bobby, calf pain  pt has known CAD and LBBB  10 systems reviewed on otherwise negative  on exam nad, lungs nml, cardiac nml, abd nml, no bobby, no calf ttp   MDM: cp/epigastric pain-will do cardiac work up, lipase, admit    Critical Care Time  CritCare Time    Procedures

## 2018-08-31 NOTE — PATIENT INSTRUCTIONS
Go straight to the ER for further evaluation    Chest Pain   AMBULATORY CARE:   Chest pain  can be caused by a range of conditions, from not serious to life-threatening  It may be caused by a heart attack or a blood clot in your lungs  Sometimes chest pain or pressure is caused by poor blood flow to your heart (angina)  Infection, inflammation, or a fracture in the bones or cartilage in your chest can cause pain or discomfort  Chest pain can also be a symptom of a digestive problem, such as acid reflux or a stomach ulcer  An anxiety attack or a strong emotion such as anger can also cause chest pain  It is important to follow up with your healthcare provider to find the cause of your chest pain  Common symptoms you may have with chest pain:   · Fever or sweating     · Nausea or vomiting     · Shortness of breath     · Discomfort or pressure that spreads from your chest to your back, jaw, or arm     · A racing or slow heartbeat     · Feeling weak, tired, or faint  Call 911 if:   · You have any of the following signs of a heart attack:      ¨ Squeezing, pressure, or pain in your chest that lasts longer than 5 minutes or returns    ¨ Discomfort or pain in your back, neck, jaw, stomach, or arm     ¨ Trouble breathing    ¨ Nausea or vomiting    ¨ Lightheadedness or a sudden cold sweat, especially with chest pain or trouble breathing    Seek care immediately if:   · You have chest discomfort that gets worse, even with medicine  · You cough or vomit blood  · Your bowel movements are black or bloody  · You cannot stop vomiting, or it hurts to swallow  Contact your healthcare provider if:   · You have questions or concerns about your condition or care  Treatment for chest pain  may include medicine to treat your symptoms while your healthcare provider finds the cause of your chest pain  · Medicines  may be given to treat the cause of your chest pain   Examples include pain medicine, anxiety medicine, or medicines to increase blood flow to your heart  · Do not take certain medicines without asking your healthcare provider first   These include NSAIDs, herbal or vitamin supplements, or hormones (estrogen or progestin)  Follow up with your healthcare provider within 72 hours, or as directed: You may need to return for more tests to find the cause of your chest pain  You may be referred to a specialist, such as a cardiologist or gastroenterologist  Write down your questions so you remember to ask them during your visits  Healthy living tips: The following are general healthy guidelines  If your chest pain is caused by a heart problem, your healthcare provider will give you specific guidelines to follow  · Do not smoke  Nicotine and other chemicals in cigarettes and cigars can cause lung and heart damage  Ask your healthcare provider for information if you currently smoke and need help to quit  E-cigarettes or smokeless tobacco still contain nicotine  Talk to your healthcare provider before you use these products  · Eat a variety of healthy, low-fat foods  Healthy foods include fruits, vegetables, whole-grain breads, low-fat dairy products, beans, lean meats, and fish  Ask for more information about a heart healthy diet  · Ask about activity  Your healthcare provider will tell you which activities to limit or avoid  Ask when you can drive, return to work, and have sex  Ask about the best exercise plan for you  · Maintain a healthy weight  Ask your healthcare provider how much you should weigh  Ask him or her to help you create a weight loss plan if you are overweight  · Get the flu and pneumonia vaccines  All adults should get the influenza (flu) vaccine  Get it every year as soon as it becomes available  The pneumococcal vaccine is given to adults aged 72 years or older  The vaccine is given every 5 years to prevent pneumococcal disease, such as pneumonia    © 2017 [employer] Information is for End User's use only and may not be sold, redistributed or otherwise used for commercial purposes  All illustrations and images included in CareNotes® are the copyrighted property of [employer]  or [employer]  The above information is an  only  It is not intended as medical advice for individual conditions or treatments  Talk to your doctor, nurse or pharmacist before following any medical regimen to see if it is safe and effective for you

## 2018-08-31 NOTE — PROGRESS NOTES
[facility]        NAME: Leida Camarena is a 70 y o  female  : 1950    MRN: [id number]  DATE: 2018  TIME: 3:25 PM    Assessment and Plan   Chest pain, unspecified type [R07 9]  1  Chest pain, unspecified type  POCT ECG    Transfer to other facility         Patient Instructions       Follow up with PCP in 3-5 days  Proceed to  ER if symptoms worsen  Chief Complaint     Chief Complaint   Patient presents with    Chest Pain     Pt c/o chest pain since Thursday  Pt describes pain as a pressure, midsternal, 4/10, does not radiate, not reporducible  Pt also c/o nausea without vomiting and weakness  History of Present Illness       39-year-old female presents with chest pressure and discomfort, weakness and chills since last Wednesday night  No fevers sore throat ear pains reported  No shortness of breath or wheezing reported  Reports some occasional abdominal pain as well  Patient thinks it is because her doctor changed her GI medicines  Chest Pain    This is a new problem  The current episode started in the past 7 days  The onset quality is sudden  The problem has been waxing and waning  The pain is present in the substernal region  The pain is at a severity of 4/10  The quality of the pain is described as pressure  The pain does not radiate  Associated symptoms include abdominal pain, dizziness (Reports some dizziness when standing), malaise/fatigue, nausea and weakness  Pertinent negatives include no back pain, claudication, cough, diaphoresis, exertional chest pressure, fever, headaches, hemoptysis, irregular heartbeat, leg pain, lower extremity edema, near-syncope, orthopnea, palpitations, shortness of breath, sputum production or vomiting  The pain is aggravated by nothing  She has tried nothing for the symptoms  The treatment provided no relief  There are no known risk factors  Her past medical history is significant for hyperlipidemia and hypertension  Pertinent negatives for past medical history include no mitral valve prolapse and no pacemaker  Review of Systems   Review of Systems   Constitutional: Positive for malaise/fatigue  Negative for diaphoresis and fever  HENT: Negative  Eyes: Negative  Respiratory: Negative  Negative for cough, hemoptysis, sputum production and shortness of breath  Cardiovascular: Positive for chest pain  Negative for palpitations, orthopnea, claudication and near-syncope  Gastrointestinal: Positive for abdominal pain and nausea  Negative for vomiting  Musculoskeletal: Negative  Negative for back pain  Skin: Negative  Neurological: Positive for dizziness (Reports some dizziness when standing) and weakness  Negative for headaches  Current Medications       Current Outpatient Prescriptions:     amLODIPine (NORVASC) 2 5 mg tablet, amlodipine 2 5 mg tablet, Disp: , Rfl:     Cholecalciferol (VITAMIN D3) 1000 UNIT/SPRAY LIQD, Vitamin D3, Disp: , Rfl:     levothyroxine 100 mcg tablet, take 1 tablet by mouth once daily, Disp: 90 tablet, Rfl: 1    hydrocortisone 2 5 % cream, Apply topically 2 (two) times a day (Patient not taking: Reported on 8/31/2018 ), Disp: 30 g, Rfl: 0    omeprazole (PriLOSEC) 10 mg delayed release capsule, Take 1 capsule (10 mg total) by mouth 2 (two) times a day (Patient not taking: Reported on 8/15/2018 ), Disp: 180 capsule, Rfl: 0    traMADol (ULTRAM) 50 mg tablet, Take 1 tablet every 4-6 hours by oral route as needed  , Disp: , Rfl:     Current Allergies     Allergies as of 08/31/2018 - Reviewed 08/31/2018   Allergen Reaction Noted    Aciphex [rabeprazole] Itching, Chest Pain, Shortness Of Breath, and GI Intolerance 07/02/2014    Benzonatate Other (See Comments), Chest Pain, and Shortness Of Breath 07/02/2014    Biaxin [clarithromycin] Other (See Comments), Chest Pain, and Nausea Only 04/22/2012    Doxycycline GI Intolerance, Chest Pain, Itching, and Shortness Of Breath 07/02/2014    Avelox [moxifloxacin] GI Intolerance, Chest Pain, and Nausea Only 04/22/2012    Co q 10 [coenzyme q10]  12/20/2016    Codeine GI Intolerance, Chest Pain, and Nausea Only 04/22/2012    Metoclopramide GI Intolerance 02/08/2016    Nizatidine Other (See Comments), Chest Pain, Nausea Only, Vomiting, and GI Intolerance 07/22/2015    Oxycodone GI Intolerance 10/31/2016    Oxycodone-acetaminophen Chest Pain and Nausea Only 04/22/2012    Plecanatide  08/23/2017    Pravastatin Other (See Comments), Headache, and Nausea Only 08/09/2016    Ranitidine Other (See Comments), Chest Pain, and GI Intolerance 02/08/2016    Ubidecarenone GI Intolerance 08/09/2016    Caffeine Other (See Comments) and Headache 04/29/2013    Chlorhexidine Rash 07/02/2014    Latex Itching and Rash 02/08/2016    Levaquin [levofloxacin] Rash, Chest Pain, Nausea Only, Other (See Comments), and GI Intolerance 04/22/2012    Other Rash and Edema 04/22/2012    Sulfa antibiotics Rash             The following portions of the patient's history were reviewed and updated as appropriate: allergies, current medications, past family history, past medical history, past social history, past surgical history and problem list      Past Medical History:   Diagnosis Date    Arthritis     OA, bilateral resolved 08/09/2016    Balance disorder     resolved 01/06/2017    Chronic constipation     resolved 01/06/2017    Chronic headaches     Colon polyps     Depression     Dermatitis     resolved 01/06/2017    Esophageal reflux     resolved 01/06/2017    Fibromyalgia     Frequent headaches     resolved 01/06/2017    GERD (gastroesophageal reflux disease)     symptomatic-for EGD today 10/31/2016    Heartburn     Herniated lumbar intervertebral disc     Hyperlipidemia     Hypertension     last assessed 08/04/2017    Hypothyroid     IBS (irritable bowel syndrome)     Incontinence     Mobility impaired     uses cane -needs R  TKR    Osteoarthritis, knee     bilateral resolved 01/06/2017    Stress incontinence     resolved 01/06/2017    SAVANNAH (stress urinary incontinence, female)     Trouble swallowing     Varicose veins of bilateral lower extremities with pain     wears support hose    Varicose veins of lower extremity     both with pain resolved 01/06/2017    Wears dentures     full upper   partial lower    Wears glasses        Past Surgical History:   Procedure Laterality Date    ABDOMINAL SURGERY      laparoscopy    BLADDER SURGERY      stimulator then removal    BREAST SURGERY  09/29/2009    R breast-lumpectomy w/ lymph nodes, 9/2/09 lymph node resection right breast 1985 b/l breast surgery resolved 09/02/2009    COLONOSCOPY      DIAGNOSTIC LAPAROSCOPY      ESOPHAGOGASTRODUODENOSCOPY      HYSTERECTOMY      KEVEN    INCISIONAL BREAST BIOPSY      JOINT REPLACEMENT      L TKR    KNEE SURGERY      left knee 1/28/05 and 1994 right knee 7/22/05, resolved 01/28/2005    NECK SURGERY  1997    lymph node resection     NOSE SURGERY      resolved 10/01/2008    IA ESOPHAGOGASTRODUODENOSCOPY TRANSORAL DIAGNOSTIC N/A 12/21/2016    Procedure: ESOPHAGOGASTRODUODENOSCOPY (EGD); Surgeon: Harvey Lyn DO;  Location: [facility]; Service: Gastroenterology    IA ESOPHAGOGASTRODUODENOSCOPY TRANSORAL DIAGNOSTIC N/A 10/31/2016    Procedure: ESOPHAGOGASTRODUODENOSCOPY (EGD); Surgeon: Harvey Lyn DO;  Location: [facility]; Service: Gastroenterology    SALPINGOOPHORECTOMY Bilateral        Family History   Problem Relation Age of Onset    Breast cancer Mother     Stomach cancer Family     Colon cancer Family     Heart failure Family     Diabetes Family     Hypertension Family     Thyroid disease unspecified Family         disorder         Medications have been verified          Objective   /87   Pulse 65   Temp 98 5 °F (36 9 °C) (Tympanic)   Resp 16   Ht 5' 5" (1 651 m)   Wt 91 2 kg (201 lb)   SpO2 98%   BMI 33 45 kg/m² Physical Exam     Physical Exam   Constitutional: She is oriented to person, place, and time  She appears well-developed and well-nourished  No distress  HENT:   Head: Normocephalic and atraumatic  Right Ear: External ear normal    Left Ear: External ear normal    Nose: Nose normal    Mouth/Throat: Oropharynx is clear and moist  No oropharyngeal exudate  Eyes: Conjunctivae are normal  Right eye exhibits no discharge  Left eye exhibits no discharge  Neck: Normal range of motion  Neck supple  Cardiovascular: Normal rate, regular rhythm, normal heart sounds and intact distal pulses  No murmur heard  Pulmonary/Chest: Effort normal and breath sounds normal  No respiratory distress  She has no wheezes  She has no rales  Abdominal: Soft  Bowel sounds are normal  There is no tenderness  Musculoskeletal: Normal range of motion  Lymphadenopathy:     She has no cervical adenopathy  Neurological: She is alert and oriented to person, place, and time  Skin: Skin is warm and dry  Psychiatric: She has a normal mood and affect  Nursing note and vitals reviewed  EKG reviewed  Compared to previous no acute changes noted  Goldy Carpenter

## 2018-08-31 NOTE — ED NOTES
Pt  Reports she took 324mg asa this morning at 0255, Dr Filomena Edmondson notified, states he still wants patient to have full dose at this time       Nilton Jackson RN  08/31/18 [id number]

## 2018-09-01 VITALS
DIASTOLIC BLOOD PRESSURE: 81 MMHG | SYSTOLIC BLOOD PRESSURE: 137 MMHG | OXYGEN SATURATION: 99 % | HEIGHT: 65 IN | HEART RATE: 58 BPM | WEIGHT: 197.09 LBS | BODY MASS INDEX: 32.84 KG/M2 | RESPIRATION RATE: 18 BRPM | TEMPERATURE: 97.2 F

## 2018-09-01 PROBLEM — R07.9 CHEST PAIN: Status: RESOLVED | Noted: 2018-08-31 | Resolved: 2018-09-01

## 2018-09-01 LAB
ANION GAP SERPL CALCULATED.3IONS-SCNC: 9 MMOL/L (ref 4–13)
ATRIAL RATE: 61 BPM
ATRIAL RATE: 61 BPM
ATRIAL RATE: 67 BPM
BUN SERPL-MCNC: 12 MG/DL (ref 5–25)
CALCIUM SERPL-MCNC: 9.3 MG/DL (ref 8.3–10.1)
CHLORIDE SERPL-SCNC: 107 MMOL/L (ref 100–108)
CO2 SERPL-SCNC: 25 MMOL/L (ref 21–32)
CREAT SERPL-MCNC: 0.87 MG/DL (ref 0.6–1.3)
ERYTHROCYTE [DISTWIDTH] IN BLOOD BY AUTOMATED COUNT: 13.2 % (ref 11.6–15.1)
GFR SERPL CREATININE-BSD FRML MDRD: 68 ML/MIN/1.73SQ M
GLUCOSE SERPL-MCNC: 95 MG/DL (ref 65–140)
HCT VFR BLD AUTO: 41.5 % (ref 34.8–46.1)
HGB BLD-MCNC: 13.6 G/DL (ref 11.5–15.4)
MCH RBC QN AUTO: 32.7 PG (ref 26.8–34.3)
MCHC RBC AUTO-ENTMCNC: 32.8 G/DL (ref 31.4–37.4)
MCV RBC AUTO: 100 FL (ref 82–98)
P AXIS: 47 DEGREES
P AXIS: 53 DEGREES
P AXIS: 53 DEGREES
PLATELET # BLD AUTO: 232 THOUSANDS/UL (ref 149–390)
PMV BLD AUTO: 10.9 FL (ref 8.9–12.7)
POTASSIUM SERPL-SCNC: 4.1 MMOL/L (ref 3.5–5.3)
PR INTERVAL: 150 MS
PR INTERVAL: 156 MS
PR INTERVAL: 156 MS
QRS AXIS: -14 DEGREES
QRS AXIS: -29 DEGREES
QRS AXIS: -29 DEGREES
QRSD INTERVAL: 148 MS
QRSD INTERVAL: 148 MS
QRSD INTERVAL: 156 MS
QT INTERVAL: 462 MS
QTC INTERVAL: 465 MS
QTC INTERVAL: 465 MS
QTC INTERVAL: 488 MS
RBC # BLD AUTO: 4.16 MILLION/UL (ref 3.81–5.12)
SODIUM SERPL-SCNC: 141 MMOL/L (ref 136–145)
T WAVE AXIS: 100 DEGREES
T WAVE AXIS: 103 DEGREES
T WAVE AXIS: 103 DEGREES
TROPONIN I SERPL-MCNC: <0.02 NG/ML
VENTRICULAR RATE: 61 BPM
VENTRICULAR RATE: 61 BPM
VENTRICULAR RATE: 67 BPM
WBC # BLD AUTO: 5.33 THOUSAND/UL (ref 4.31–10.16)

## 2018-09-01 PROCEDURE — 80048 BASIC METABOLIC PNL TOTAL CA: CPT | Performed by: INTERNAL MEDICINE

## 2018-09-01 PROCEDURE — 93010 ELECTROCARDIOGRAM REPORT: CPT | Performed by: INTERNAL MEDICINE

## 2018-09-01 PROCEDURE — 99217 PR OBSERVATION CARE DISCHARGE MANAGEMENT: CPT | Performed by: INTERNAL MEDICINE

## 2018-09-01 PROCEDURE — 85027 COMPLETE CBC AUTOMATED: CPT | Performed by: INTERNAL MEDICINE

## 2018-09-01 RX ORDER — FAMOTIDINE 20 MG/1
20 TABLET, FILM COATED ORAL EVERY OTHER DAY
Qty: 15 TABLET | Refills: 1 | Status: SHIPPED | OUTPATIENT
Start: 2018-09-02

## 2018-09-01 RX ADMIN — LEVOTHYROXINE SODIUM 100 MCG: 100 TABLET ORAL at 06:22

## 2018-09-01 NOTE — DISCHARGE SUMMARY
Discharge Summary - [facility] Medicine    Patient Information: Leida Stafford 70 y o  female MRN: [id number]  Unit/Bed#: E4 -01 Encounter: [id number]    Discharging Physician / Practitioner: Ann Mckeon DO  PCP: Marce Vilchis DO  Admission Date: 8/31/2018  Discharge Date: 09/01/18    Disposition:     Home     Reason for Admission: chest pain    Discharge Diagnoses:     Principal Problem (Resolved):    Chest pain  Active Problems:    * No active hospital problems  *      Consultations During Hospital Stay:  · none    Procedures Performed:     · none    Significant Findings / Test Results:   Xr Chest 2 Views  Result Date: 8/31/2018  Impression: No acute cardiopulmonary disease  Incidental Findings:   · none    Test Results Pending at Discharge (will require follow up):   · none     Outpatient Tests Requested:  lexiscan stress test    Hospital Course:     Leida Lincoln is a 70 y o  female patient who originally presented to the hospital on 8/31/2018 due to chest pain atypical  Likely related to hiatal hernia  Troponin negative  Due to LBBB and risk factors will set pt up for nuclear med stress test outpt      For her Hiatal hernia with gerd  She had been on omeprazole outpt  This was d/valerie in may  She was pepcid given last night  She states it improved her symptoms but now she is having nausea/diarrhea this am  Question if from medication or a viral gastroenteritis  If symptoms resolve tomorrow and return after she takes the pill on Sunday night would d/c the pepcid  Otherwise will continue it qod      She was discharged to home  As she was tolerating diet and not having intractable vomiting  Discharge Day Visit / Exam:     * Please refer to separate progress note for these details *    Discharge instructions/Information to patient and family:   See after visit summary for information provided to patient and family        Provisions for Follow-Up Care:  See after visit summary for information related to follow-up care and any pertinent home health orders  Discharge Medications:  See after visit summary for reconciled discharge medications provided to patient and family

## 2018-09-01 NOTE — PLAN OF CARE
Problem: Potential for Falls  Goal: Patient will remain free of falls  INTERVENTIONS:  - Assess patient frequently for physical needs  -  Identify cognitive and physical deficits and behaviors that affect risk of falls    -  [place]o fall precautions as indicated by assessment   - Educate patient/family on patient safety including physical limitations  - Instruct patient to call for assistance with activity based on assessment  - Modify environment to reduce risk of injury  - Consider OT/PT consult to assist with strengthening/mobility   Outcome: Progressing      Problem: PAIN - ADULT  Goal: Verbalizes/displays adequate comfort level or baseline comfort level  Interventions:  - Encourage patient to monitor pain and request assistance  - Assess pain using appropriate pain scale  - Administer analgesics based on type and severity of pain and evaluate response  - Implement non-pharmacological measures as appropriate and evaluate response  - Consider cultural and social influences on pain and pain management  - Notify physician/advanced practitioner if interventions unsuccessful or patient reports new pain   Outcome: Progressing      Problem: SAFETY ADULT  Goal: Maintain or return to baseline ADL function  INTERVENTIONS:  -  Assess patient's ability to carry out ADLs; assess patient's baseline for ADL function and identify physical deficits which impact ability to perform ADLs (bathing, care of mouth/teeth, toileting, grooming, dressing, etc )  - Assess/evaluate cause of self-care deficits   - Assess range of motion  - Assess patient's mobility; develop plan if impaired  - Assess patient's need for assistive devices and provide as appropriate  - Encourage maximum independence but intervene and supervise when necessary  ¯ Assess for home care needs following discharge    Outcome: Progressing    Goal: Maintain or return mobility status to optimal level  INTERVENTIONS:  - Assess patient's baseline mobility status (ambulation, transfers, stairs, etc )    - Identify cognitive and physical deficits and behaviors that affect mobility  - Identify mobility aids required to assist with transfers and/or ambulation (gait belt, sit-to-stand, lift, walker, cane, etc )  - [place] fall precautions as indicated by assessment  - Record patient progress and toleration of activity level on Mobility SBAR; progress patient to next Phase/Stage  - Instruct patient to call for assistance with activity based on assessment     Outcome: Progressing      Problem: DISCHARGE PLANNING  Goal: Discharge to home or other facility with appropriate resources  INTERVENTIONS:  - Identify barriers to discharge w/patient and caregiver  - Arrange for needed discharge resources and transportation as appropriate  - Identify discharge learning needs (meds, wound care, etc )  - Arrange for interpretive services to assist at discharge as needed     Outcome: Progressing      Problem: Knowledge Deficit  Goal: Patient/family/caregiver demonstrates understanding of disease process, treatment plan, medications, and discharge instructions  Complete learning assessment and assess knowledge base  Interventions:  - Provide teaching at level of understanding  - Provide teaching via preferred learning methods   Outcome: Progressing      Problem: CARDIOVASCULAR - ADULT  Goal: Maintains optimal cardiac output and hemodynamic stability  INTERVENTIONS:  - Monitor I/O, vital signs and rhythm  - Monitor for S/S and trends of decreased cardiac output i e  bleeding, hypotension  - Assess quality of pulses, skin color and temperature  - Assess for signs of decreased coronary artery perfusion - ex   Angina  - Instruct patient to report change in severity of symptoms    Outcome: Progressing      Problem: GASTROINTESTINAL - ADULT  Goal: Minimal or absence of nausea and/or vomiting  INTERVENTIONS:  - Administer IV fluids as ordered to ensure adequate hydration  - Maintain NPO status until nausea and vomiting are resolved  - Provide nonpharmacologic comfort measures as appropriate  - Advance diet as tolerated, if ordered  - Nutrition services referral to assist patient with adequate nutrition and appropriate food choices    Outcome: Progressing    Goal: Maintains or returns to baseline bowel function  INTERVENTIONS:  - Assess bowel function  - Encourage oral fluids to ensure adequate hydration  - Administer IV fluids as ordered to ensure adequate hydration  - Administer ordered medications as needed  - Encourage mobilization and activity  - Nutrition services referral to assist patient with appropriate food choices   Outcome: Progressing    Goal: Maintains adequate nutritional intake  INTERVENTIONS:  - Monitor percentage of each meal consumed  - Identify factors contributing to decreased intake, treat as appropriate  - Monitor I&O, WT and lab values  - Obtain nutrition services referral as needed    Outcome: Progressing    Goal: Establish and maintain optimal ostomy function  Outcome: Progressing

## 2018-09-01 NOTE — PLAN OF CARE
CARDIOVASCULAR - ADULT     Maintains optimal cardiac output and hemodynamic stability Progressing        DISCHARGE PLANNING     Discharge to home or other facility with appropriate resources Progressing        GASTROINTESTINAL - ADULT     Minimal or absence of nausea and/or vomiting Progressing     Maintains or returns to baseline bowel function Progressing     Maintains adequate nutritional intake Progressing     Establish and maintain optimal ostomy function Progressing        Knowledge Deficit     Patient/family/caregiver demonstrates understanding of disease process, treatment plan, medications, and discharge instructions Progressing        PAIN - ADULT     Verbalizes/displays adequate comfort level or baseline comfort level Progressing        Potential for Falls     Patient will remain free of falls Progressing        SAFETY ADULT     Maintain or return to baseline ADL function Progressing     Maintain or return mobility status to optimal level Progressing

## 2018-09-01 NOTE — CASE MANAGEMENT
Initial Clinical Review    Admission: Date/Time/Statement:  OBS 8/31 @ 1825    Orders Placed This Encounter   Procedures    Place in Observation (expected length of stay for this patient is less than two midnights)     Standing Status:   Standing     Number of Occurrences:   1     Order Specific Question:   Admitting Physician     Answer:   Mary Cardenas     Order Specific Question:   Level of Care     Answer:   Med Surg [16]         ED: Date/Time/Mode of Arrival:   ED Arrival Information     Expected Arrival Acuity Means of Arrival Escorted By Service Admission Type    - 8/31/2018 15:42 Emergent Walk-In Family Member General Medicine Emergency    Arrival Complaint    Abnormal EKG          Chief Complaint:   Chief Complaint   Patient presents with    Chest Pain     pt went to [facility] now today for chest pressure ongoing since thursaday with some nausea  did an EKG and told it looked a little abnormal and sent to ED by private vehicle for further evaluation  History of Illness: 79-year-old woman with a past medical history of high blood pressure who presents for evaluation of central chest pressure  Symptoms started Wednesday evening, central chest pressure as well as epigastric  Did not radiate anywhere, was not associated with any presyncope or syncope  Was not associated with any diaphoresis, palpitations  Patient does endorse dizziness upon exertion but denies pain with exertion  This has been ongoing for weeks  Denies any dark tarry stools  Patient states he has never had a MI  Patient was evaluated by Dr Daryl Zuñiga of Cardiology with a stress test last year  Per patient report, she has a blockage in 1 of her arteries but has had no further workup  Patient otherwise denies headache neck pain, denies fevers chills    Patient does endorse a long history of stomach pain problems which patient states is irritable bowels "    ED Vital Signs:   ED Triage Vitals   Temperature Pulse Respirations Blood Pressure SpO2   08/31/18 1547 08/31/18 1547 08/31/18 1547 08/31/18 1547 08/31/18 1547   98 1 °F (36 7 °C) 61 16 126/60 98 %      Temp Source Heart Rate Source Patient Position - Orthostatic VS BP Location FiO2 (%)   08/31/18 1547 08/31/18 1915 08/31/18 1915 08/31/18 1915 --   Oral Monitor Lying Right arm       Pain Score       08/31/18 1547       No Pain        Wt Readings from Last 1 Encounters:   08/31/18 89 4 kg (197 lb 1 5 oz)         Abnormal Labs/Diagnostic Test Results:   Initial Trop ng,;  CBC and CMP  Nl  CXR: No acute cardiopulmonary disease  EKG:  LBBB at 67 bpm  History of this  ED Treatment:   Medication Administration from 08/31/2018 1527 to 08/31/2018 1951       Date/Time Order Dose Route Action Action by Comments     08/31/2018 1749 aspirin chewable tablet 324 mg 324 mg Oral Given Montez Oliveira RN           Past Medical/Surgical History:    Active Ambulatory Problems     Diagnosis Date Noted    Gastroesophageal reflux disease 06/17/2018    Hypothyroid     Hypertension     Hyperlipidemia     GERD (gastroesophageal reflux disease)      Resolved Ambulatory Problems     Diagnosis Date Noted    No Resolved Ambulatory Problems     Past Medical History:   Diagnosis Date    Arthritis     Balance disorder     Chronic constipation     Chronic headaches     Colon polyps     Depression     Dermatitis     Esophageal reflux     Fibromyalgia     Frequent headaches     GERD (gastroesophageal reflux disease)     Heartburn     Herniated lumbar intervertebral disc     Hyperlipidemia     Hypertension     Hypothyroid     IBS (irritable bowel syndrome)     Incontinence     Mobility impaired     Osteoarthritis, knee     Stress incontinence     SAVANNAH (stress urinary incontinence, female)     Trouble swallowing     Varicose veins of bilateral lower extremities with pain     Varicose veins of lower extremity     Wears dentures     Wears glasses        Admitting Diagnosis: Chest pain [R07 9]    Age/Sex: 70 y o  female    Assessment/Plan:   1  Chest pain- atypical  Likely related to hiatal hernia  Will cycle troponins  Monitor on tele  Could obtain stress test outpt if work up is negative and due to the LBBB     2  Hiatal hernia with gerd- will try pepcid  Possibly every other day  Follow up with GI outpt       3  Hypothyroid- continue synthroid     4  htn- continue norvasc     VTE Prophylaxis:low risk, early ambulation  Code Status: full code     Anticipated Length of Stay:  Patient will be admitted on an Observation basis with an anticipated length of stay of  less than 2 midnights  Admission Orders:  OBS  TELE  EKG with CP or ST changes  Serial Trops  CBC,  BMP    Scheduled Meds:   Current Facility-Administered Medications:  amLODIPine 2 5 mg Oral Daily    famotidine 20 mg Oral Every Other Day    levothyroxine 100 mcg Oral Daily Before Breakfast             Continuous Infusions:    PRN Meds: ondansetron  Trops neg x 3   Written for DC 9/1  Thank you,  [address]  Utilization Review Department  Phone: [phone]; Fax [phone]  ATTENTION: Please call with any questions or concerns to [phone]  and carefully follow the prompts so that you are directed to the right person  Send all requests for admission clinical reviews, approved or denied determinations and any other requests to fax [phone]   All voicemails are confidential

## 2018-09-01 NOTE — NURSING NOTE
"Daughter Malaika Shelby [phone] requesting form \"consent to communicate\" for her mother (pt).  Form emailed to [email or web address].  She will have her mother sign and email back to Gregoria LITTLEJOHN LPN.  Until this is done we are unable to communicate with Malaika regarding her mothers care, [place] confirmed understanding. Gregoria Laboy LPN    " IV/ Tele discontinued

## 2018-09-01 NOTE — PROGRESS NOTES
[facility] Progress Note  Patient: Leida Stafford 70 y o  female   MRN: [id number]  PCP: Xenia Porras DO  Unit/Bed#: E4 [facility] Encounter: [id number]  Date Of Visit: 09/01/18      Assessment/plan  1  Chest pain- atypical  Likely related to hiatal hernia  Troponin negative  Due to LBBB and risk factors will set pt up for nuclear med stress test outpt       2  Hiatal hernia with gerd- pepcid given last night  She states it improved her symptoms but now she is having nausea/diarrhea this am  ? If from medication or a viral gastroenteritis  If symptoms resolve tomorrow and return after she takes the pill on Sunday would d/c the pepcid  Otherwise will continue it qod       3  Hypothyroid- continue synthroid     4  htn- continue norvasc    5  Nausea/ diarrhea- possible viral gastroenteritis vrs due to pepcid  Pt is safe to go home unless she starts to have intractable vomiting or blood in vomit/diarrhea    Subjective:   Pt seen and examined  Pt had diarrhea this am  No blood  She feels nauseated this am  She took the pepcid last night and stated the pressure resolved with this pill but she started to get sick this am  No f/c no cp no sob  Objective:     Vitals: Blood pressure 137/81, pulse 58, temperature (!) 97 2 °F (36 2 °C), temperature source Tympanic, resp  rate 18, height 5' 5" (1 651 m), weight 89 4 kg (197 lb 1 5 oz), SpO2 99 %  ,Body mass index is 32 8 kg/m²      Lab, Imaging and other studies:    Results from last 7 days  Lab Units 09/01/18  0617   WBC Thousand/uL 5 33   HEMOGLOBIN g/dL 13 6   HEMATOCRIT % 41 5   PLATELETS Thousands/uL 232       Results from last 7 days  Lab Units 09/01/18  0617 08/31/18  1634   SODIUM mmol/L 141 144   POTASSIUM mmol/L 4 1 3 6   CHLORIDE mmol/L 107 105   CO2 mmol/L 25 29   BUN mg/dL 12 11   CREATININE mg/dL 0 87 1 07   CALCIUM mg/dL 9 3 9 7   ALK PHOS U/L  --  108   ALT U/L  --  23   AST U/L  --  19       Results from last 7 days  Lab Units 08/31/18  5711 08/31/18 2048 08/31/18  1634   TROPONIN I ng/mL <0 02 <0 02 <0 02     No results found for: Shailesh Vela      Xr Chest 2 Views    Result Date: 8/31/2018  Narrative: CHEST INDICATION:   chest pain  COMPARISON:  August 10, 2010 EXAM PERFORMED/VIEWS:  XR CHEST PA & LATERAL FINDINGS: Cardiomediastinal silhouette appears unremarkable  The lungs are clear  No pneumothorax or pleural effusion  Osseous structures appear within normal limits for patient age  Impression: No acute cardiopulmonary disease  Workstation performed: [id number]       Scheduled Meds:   Current Facility-Administered Medications:  amLODIPine 2 5 mg Oral Daily Carol Ambron, DO   famotidine 20 mg Oral Every Other Day Carol Ambron, DO   levothyroxine 100 mcg Oral Daily Before Breakfast Carol Ambron, DO   ondansetron 4 mg Intravenous Q6H PRN Carol Ambron, DO     Continuous Infusions:    PRN Meds: ondansetron      Physical exam:  Physical Exam  General appearance: alert and oriented, in no acute distress  Head: Normocephalic, without obvious abnormality, atraumatic  Eyes: conjunctivae/corneas clear  PERRL, EOM's intact  Fundi benign    Neck: no adenopathy, no carotid bruit, no JVD, supple, symmetrical, trachea midline and thyroid not enlarged, symmetric, no tenderness/mass/nodules  Lungs: clear to auscultation bilaterally  Heart: regular rate and rhythm, S1, S2 normal, no murmur, click, rub or gallop  Abdomen: soft, non-tender; bowel sounds normal; no masses,  no organomegaly  Extremities: extremities normal, warm and well-perfused; no cyanosis, clubbing, or edema  Pulses: 2+ and symmetric  Skin: Skin color, texture, turgor normal  No rashes or lesions  Neurologic: Grossly normal

## 2018-09-04 ENCOUNTER — TRANSITIONAL CARE MANAGEMENT (OUTPATIENT)
Dept: FAMILY MEDICINE CLINIC | Facility: CLINIC | Age: 68
End: 2018-09-04

## 2018-09-05 ENCOUNTER — HOSPITAL ENCOUNTER (OUTPATIENT)
Dept: NUCLEAR MEDICINE | Facility: HOSPITAL | Age: 68
Discharge: HOME | End: 2018-09-05
Attending: INTERNAL MEDICINE
Payer: COMMERCIAL

## 2018-09-05 ENCOUNTER — HOSPITAL ENCOUNTER (OUTPATIENT)
Dept: NON INVASIVE DIAGNOSTICS | Facility: HOSPITAL | Age: 68
Discharge: HOME | End: 2018-09-05
Attending: INTERNAL MEDICINE
Payer: COMMERCIAL

## 2018-09-05 DIAGNOSIS — R07.9 CHEST PAIN: ICD-10-CM

## 2018-09-05 PROCEDURE — 78452 HT MUSCLE IMAGE SPECT MULT: CPT

## 2018-09-05 PROCEDURE — A9502 TC99M TETROFOSMIN: HCPCS

## 2018-09-05 PROCEDURE — 93017 CV STRESS TEST TRACING ONLY: CPT

## 2018-09-05 PROCEDURE — 93016 CV STRESS TEST SUPVJ ONLY: CPT | Performed by: INTERNAL MEDICINE

## 2018-09-05 PROCEDURE — 78452 HT MUSCLE IMAGE SPECT MULT: CPT | Performed by: INTERNAL MEDICINE

## 2018-09-05 PROCEDURE — 93018 CV STRESS TEST I&R ONLY: CPT | Performed by: INTERNAL MEDICINE

## 2018-09-05 RX ADMIN — REGADENOSON 0.4 MG: 0.08 INJECTION, SOLUTION INTRAVENOUS at 10:45

## 2018-09-06 ENCOUNTER — OFFICE VISIT (OUTPATIENT)
Dept: FAMILY MEDICINE CLINIC | Facility: CLINIC | Age: 68
End: 2018-09-06
Payer: COMMERCIAL

## 2018-09-06 VITALS
RESPIRATION RATE: 16 BRPM | HEIGHT: 64 IN | WEIGHT: 200.5 LBS | HEART RATE: 70 BPM | TEMPERATURE: 98.1 F | SYSTOLIC BLOOD PRESSURE: 130 MMHG | DIASTOLIC BLOOD PRESSURE: 82 MMHG | BODY MASS INDEX: 34.23 KG/M2

## 2018-09-06 DIAGNOSIS — K44.9 HIATAL HERNIA: ICD-10-CM

## 2018-09-06 DIAGNOSIS — K21.9 GASTROESOPHAGEAL REFLUX DISEASE, ESOPHAGITIS PRESENCE NOT SPECIFIED: Primary | ICD-10-CM

## 2018-09-06 PROCEDURE — 99495 TRANSJ CARE MGMT MOD F2F 14D: CPT | Performed by: NURSE PRACTITIONER

## 2018-09-06 NOTE — PROGRESS NOTES
Chief Complaint   Patient presents with    Transition of Care Management     Pt here for TCM for chest pressure and GERD     Date and time hospital follow up call was made:  9/4/2018  8:56 AM  Hospital care reviewed:  Records reviewed  Patient was hopsitalized at:  [facility]  Date of admission:  8/31/18  Date of discharge:  9/1/18  Diagnosis:  Chest Pain Atypical  Disposition:  Home  Were the patients medicaitons reviewed and updated:  Yes  Current symptoms:  Middle abdominal pain  Middle abdominal pain severity:  Moderate  Middle abdominal pain onset:  Unable to tell  Post hospital issues:  None  Scheduled for follow up?:  Yes  Did you obtain your prescribed medications:  Yes  Do you need help managing your perscriptions or medications:  No  Is transportation to your appointments needed:  No  I have advised the patient to call PCP with any new or worsening symptoms (please type in name along with any credentials): Eleni Adam MA  Living Arrangements:  Children  Are you recieving outpatient services:  No  Are you recieving home care services:  No  Are you using any community resources:  No  Current waiver service:  No  Have you fallen in the last 12 months:  No  Interperter language line required?:  No  Counseling:  Patient         Assessment/Plan:     Diagnoses and all orders for this visit:    Gastroesophageal reflux disease, esophagitis presence not specified    Hiatal hernia      continue with pepcid every other day  Pursue follow ups as scheduled  Subjective:      Patient ID: Leida Lincoln is a 70 y o  female  Patient here to follow up from hospitalization for atypical chest pain  She has a known hiatal hernia  Which has been giving her a problem  She has been taking Pepcid 20 mg every other day  When she does not take it she does feel the pressure and nausea  When she does take pepcid her pressure and chest pain resolve within 35/45 minutes   She has been taking notice of what may be triggering her symptoms  She also states when she does not get a full 8 hours of sleep she notices she is nauseated and lightheaded  Patient has significant cardiac history with brothers, sisters and father with heart attack  She has a cardiology appointment scheduled 9/13/2018 with Dr Dodie Mena  Her NM stress test was non diagnostic due to underlying LBBB  In the hospital she had EKG and TROPI were negative  The following portions of the patient's history were reviewed and updated as appropriate: allergies, current medications, past family history, past medical history, past social history, past surgical history and problem list     Review of Systems   Constitutional: Negative for fatigue and fever  Respiratory: Positive for chest tightness  Negative for shortness of breath  Cardiovascular: Positive for chest pain (comes and goes associated with GERD)  Gastrointestinal: Positive for abdominal pain and nausea  Heart burn    Genitourinary:        Incontinence    Psychiatric/Behavioral: Positive for sleep disturbance  Negative for dysphoric mood  Objective:      /82 (BP Location: Left arm, Patient Position: Sitting, Cuff Size: Adult)   Pulse 70   Temp 98 1 °F (36 7 °C) (Temporal)   Resp 16   Ht 5' 4 07" (1 628 m)   Wt 90 9 kg (200 lb 8 oz)   BMI 34 34 kg/m²          Physical Exam   Constitutional: She is oriented to person, place, and time  She appears well-developed and well-nourished  HENT:   Head: Normocephalic and atraumatic  Cardiovascular: Normal rate, regular rhythm, S1 normal, S2 normal and normal heart sounds  No murmur heard  Pulmonary/Chest: Effort normal and breath sounds normal    Abdominal: Soft  She exhibits no distension  Bowel sounds are increased  There is no tenderness  Neurological: She is alert and oriented to person, place, and time  Nursing note and vitals reviewed

## 2018-09-07 LAB
CHEST PAIN STATEMENT: NORMAL
MAX DIASTOLIC BP: 86 MMHG
MAX HEART RATE: 113 BPM
MAX PREDICTED HEART RATE: 151 BPM
MAX. SYSTOLIC BP: 144 MMHG
PROTOCOL NAME: NORMAL
REASON FOR TERMINATION: NORMAL
TARGET HR FORMULA: NORMAL
TEST INDICATION: NORMAL
TIME IN EXERCISE PHASE: NORMAL

## 2018-09-10 ENCOUNTER — TELEPHONE (OUTPATIENT)
Dept: FAMILY MEDICINE CLINIC | Facility: CLINIC | Age: 68
End: 2018-09-10

## 2018-09-13 ENCOUNTER — OFFICE VISIT (OUTPATIENT)
Dept: CARDIOLOGY CLINIC | Facility: CLINIC | Age: 68
End: 2018-09-13
Payer: COMMERCIAL

## 2018-09-13 VITALS
BODY MASS INDEX: 33.82 KG/M2 | HEIGHT: 65 IN | HEART RATE: 64 BPM | DIASTOLIC BLOOD PRESSURE: 76 MMHG | RESPIRATION RATE: 16 BRPM | SYSTOLIC BLOOD PRESSURE: 124 MMHG | WEIGHT: 203 LBS

## 2018-09-13 DIAGNOSIS — I10 HTN (HYPERTENSION), BENIGN: ICD-10-CM

## 2018-09-13 DIAGNOSIS — I44.7 LBBB (LEFT BUNDLE BRANCH BLOCK): Primary | ICD-10-CM

## 2018-09-13 DIAGNOSIS — R07.9 CHEST PAIN AT REST: ICD-10-CM

## 2018-09-13 PROCEDURE — 99214 OFFICE O/P EST MOD 30 MIN: CPT | Performed by: INTERNAL MEDICINE

## 2018-09-13 NOTE — PROGRESS NOTES
Cardiology Outpatient Progress Note -  Nydia 70 y o  female MRN: [id number]    @ Encounter: [id number]      Patient Active Problem List    Diagnosis Date Noted    Hypothyroid     Hypertension     Hyperlipidemia     GERD (gastroesophageal reflux disease)     Gastroesophageal reflux disease 2018       Assessment:  # LBBB on EKG  Echo 17: EF: 70%  Nuclear stress test 18: no ischemia, Breast attenuation defect apically- seen on last stress test and correlated with normal function on echo  Chest pain admit 18: neg trop  # Dyslipidemia: 10/24/17:  in past, rash with statins- did not want to restart- recommended retry  # PUD  # HTN- norvasc 2 5 mg  # Venous insufficiency- compression stockings  # hypothyroid- on synthroid    TODAY'S PLAN:  Echo follow up to correlate perfusion defect on nuclear    HPI:   71 yo female who I saw about a year ago for abnormal EKG  She has hx of PUD, IBS, HTN, fibromyalgia, OA post left TKR and venous insufficiency with treatment for varicose veins  Had stress test in past which was normal  Father had CAD with cardiac arrest  Older sister  of cardiac arrest  Echo showed EF: 70%  Interval History:   Short stay in the hospital for chest pain ()  Trop negative; It was thought to be due to her hiata hernia  She is now taking pepcid daily  Nuclear stress test 18: TID 1 19, EF: 53%  Breast attenuation again apically- same defect seen on last nuclear stress test with echo showing normal function apically      Past Medical History:   Diagnosis Date    Arthritis     OA, bilateral resolved 2016    Balance disorder     resolved 2017    Chronic constipation     resolved 2017    Chronic headaches     Colon polyps     Depression     Dermatitis     resolved 2017    Esophageal reflux     resolved 2017    Fibromyalgia     Frequent headaches     resolved 2017    GERD (gastroesophageal reflux disease) symptomatic-for EGD today 10/31/2016    Heartburn     Herniated lumbar intervertebral disc     Hyperlipidemia     Hypertension     last assessed 08/04/2017    Hypothyroid     IBS (irritable bowel syndrome)     Incontinence     Mobility impaired     uses cane -needs R  TKR    Osteoarthritis, knee     bilateral resolved 01/06/2017    Stress incontinence     resolved 01/06/2017    SAVANNAH (stress urinary incontinence, female)     Trouble swallowing     Varicose veins of bilateral lower extremities with pain     wears support hose    Varicose veins of lower extremity     both with pain resolved 01/06/2017    Wears dentures     full upper   partial lower    Wears glasses        Review of Systems - Negative except chest pain at rest as described above with negative trop   Occasional fluttering senstation  She does admit she ate at [facility] and then the days after that she felt bad    Allergies   Allergen Reactions    Aciphex [Rabeprazole] Itching, Chest Pain, Shortness Of Breath and GI Intolerance     headache    Benzonatate Other (See Comments), Chest Pain and Shortness Of Breath     Chest pain    Biaxin [Clarithromycin] Other (See Comments), Chest Pain and Nausea Only     Reaction Date: [id number];   Chest pressure    weakness    Doxycycline GI Intolerance, Chest Pain, Itching and Shortness Of Breath    Avelox [Moxifloxacin] GI Intolerance, Chest Pain and Nausea Only     Reaction Date: [id number];     Co Q 10 [Coenzyme Q10]     Codeine GI Intolerance, Chest Pain and Nausea Only     Reaction Date: [id number];   weakness chest pain    Metoclopramide GI Intolerance    Nizatidine Other (See Comments), Chest Pain, Nausea Only, Vomiting and GI Intolerance     Nausea   Chest pain    Oxycodone GI Intolerance    Oxycodone-Acetaminophen Chest Pain and Nausea Only     Reaction Date: [id number];     Plecanatide     Pravastatin Other (See Comments), Headache and Nausea Only     headaches    Ranitidine Other (See Comments), Chest Pain and GI Intolerance     chest pain  Arm tingling    Ubidecarenone GI Intolerance    Caffeine Other (See Comments) and Headache     migraines    Chlorhexidine Rash    Latex Itching and Rash     Minor itching to the powder to gloves  Minor itching to the powder to gloves   Levaquin [Levofloxacin] Rash, Chest Pain, Nausea Only, Other (See Comments) and GI Intolerance     Reaction Date: [id number];     Other Rash and Edema     Reaction Date: [id number];   Syncope  Med is amortrpatcl? An antibiotic  Tape    Sulfa Antibiotics Rash       Current Outpatient Prescriptions:     amLODIPine (NORVASC) 2 5 mg tablet, amlodipine 2 5 mg tablet, Disp: , Rfl:     Cholecalciferol (VITAMIN D3) 1000 UNIT/SPRAY LIQD, Vitamin D3, Disp: , Rfl:     famotidine (PEPCID) 20 mg tablet, Take 1 tablet (20 mg total) by mouth every other day, Disp: 15 tablet, Rfl: 1    levothyroxine 100 mcg tablet, take 1 tablet by mouth once daily, Disp: 90 tablet, Rfl: 1    Social History     Social History    Marital status:      Spouse name: N/A    Number of children: 11    Years of education: N/A     Occupational History    retired      Social History Main Topics    Smoking status: Never Smoker    Smokeless tobacco: Never Used    Alcohol use No    Drug use: No    Sexual activity: Not on file     Other Topics Concern    Not on file     Social History Narrative    Denied history of caffeine use    5 children 4 biological 1 adopted    [employer] affiliation [employer]    Uses safety equipment seatbelts           Family History   Problem Relation Age of Onset    Breast cancer Mother     Stomach cancer Family     Colon cancer Family     Heart failure Family     Diabetes Family     Hypertension Family     Thyroid disease unspecified Family         disorder       Physical Exam:    Vitals: Blood pressure 124/76, pulse 64, resp  rate 16, height 5' 5" (1 651 m), weight 92 1 kg (203 lb)  , Body mass index is 33 78 kg/m² ,   Wt Readings from Last 3 Encounters:   09/13/18 92 1 kg (203 lb)   09/06/18 90 9 kg (200 lb 8 oz)   08/31/18 89 4 kg (197 lb 1 5 oz)         Physical Exam:    GEN: Leida Stafford appears well, alert and oriented x 3, pleasant and cooperative   HEENT: pupils equal, round, and reactive to light; extraocular muscles intact  NECK: supple, no carotid bruits   HEART: regular rhythm, normal S1 and S2, no murmurs, clicks, gallops or rubs, JVP is down   LUNGS: clear to auscultation bilaterally; no wheezes, rales, or rhonchi   ABDOMEN: normal bowel sounds, soft, no tenderness, no distention  EXTREMITIES: peripheral pulses normal; no clubbing, cyanosis, or edema  NEURO: no focal findings   SKIN: normal without suspicious lesions on exposed skin    Labs & Results:    Lab Results   Component Value Date    GLUCOSE 89 08/14/2015    CALCIUM 9 3 09/01/2018     09/01/2018    K 4 1 09/01/2018    CO2 25 09/01/2018     09/01/2018    BUN 12 09/01/2018    CREATININE 0 87 09/01/2018     Lab Results   Component Value Date    WBC 5 33 09/01/2018    HGB 13 6 09/01/2018    HCT 41 5 09/01/2018     (H) 09/01/2018     09/01/2018     No results found for: BNP   Lab Results   Component Value Date    CHOL 220 04/28/2015    CHOL 256 11/11/2014     Lab Results   Component Value Date    HDL 74 (H) 10/24/2017    HDL 84 (H) 12/03/2016    HDL 88 04/28/2015     Lab Results   Component Value Date    LDLCALC 118 (H) 10/24/2017    LDLCALC 152 (H) 12/03/2016    LDLCALC 114 (H) 04/28/2015     Lab Results   Component Value Date    TRIG 86 10/24/2017    TRIG 60 12/03/2016    TRIG 88 04/28/2015     No components found for: CHOLHDL  EKG personally reviewed by Keely Benavides DO  Counseling / Coordination of Care  Total floor / unit time spent today 25 minutes  Greater than 50% of total time was spent with the patient and / or family counseling and / or coordination of care    A description of the counseling / coordination of care: 15  Thank you for the opportunity to participate in the care of this patient    RENNY CAMACHO [address]

## 2018-09-24 ENCOUNTER — OFFICE VISIT (OUTPATIENT)
Dept: FAMILY MEDICINE CLINIC | Facility: CLINIC | Age: 68
End: 2018-09-24
Payer: COMMERCIAL

## 2018-09-24 VITALS
SYSTOLIC BLOOD PRESSURE: 122 MMHG | HEIGHT: 65 IN | HEART RATE: 56 BPM | BODY MASS INDEX: 33.72 KG/M2 | RESPIRATION RATE: 18 BRPM | DIASTOLIC BLOOD PRESSURE: 80 MMHG | TEMPERATURE: 98.9 F | WEIGHT: 202.38 LBS

## 2018-09-24 DIAGNOSIS — E03.9 HYPOTHYROIDISM, UNSPECIFIED TYPE: ICD-10-CM

## 2018-09-24 DIAGNOSIS — Z23 NEED FOR INFLUENZA VACCINATION: ICD-10-CM

## 2018-09-24 DIAGNOSIS — Z00.00 MEDICARE ANNUAL WELLNESS VISIT, SUBSEQUENT: ICD-10-CM

## 2018-09-24 DIAGNOSIS — E78.5 HYPERLIPIDEMIA, UNSPECIFIED HYPERLIPIDEMIA TYPE: Primary | ICD-10-CM

## 2018-09-24 DIAGNOSIS — K12.0 APHTHOUS ULCER: ICD-10-CM

## 2018-09-24 PROBLEM — M85.80 OSTEOPENIA: Status: ACTIVE | Noted: 2017-10-16

## 2018-09-24 PROBLEM — K21.9 GASTROESOPHAGEAL REFLUX DISEASE: Status: RESOLVED | Noted: 2018-06-17 | Resolved: 2018-09-24

## 2018-09-24 PROCEDURE — 4040F PNEUMOC VAC/ADMIN/RCVD: CPT

## 2018-09-24 PROCEDURE — 1036F TOBACCO NON-USER: CPT | Performed by: FAMILY MEDICINE

## 2018-09-24 PROCEDURE — 1125F AMNT PAIN NOTED PAIN PRSNT: CPT | Performed by: FAMILY MEDICINE

## 2018-09-24 PROCEDURE — 90662 IIV NO PRSV INCREASED AG IM: CPT

## 2018-09-24 PROCEDURE — G0008 ADMIN INFLUENZA VIRUS VAC: HCPCS

## 2018-09-24 PROCEDURE — 3008F BODY MASS INDEX DOCD: CPT | Performed by: FAMILY MEDICINE

## 2018-09-24 PROCEDURE — 1160F RVW MEDS BY RX/DR IN RCRD: CPT

## 2018-09-24 PROCEDURE — G0439 PPPS, SUBSEQ VISIT: HCPCS | Performed by: FAMILY MEDICINE

## 2018-09-24 PROCEDURE — 1160F RVW MEDS BY RX/DR IN RCRD: CPT | Performed by: FAMILY MEDICINE

## 2018-09-24 PROCEDURE — 1170F FXNL STATUS ASSESSED: CPT | Performed by: FAMILY MEDICINE

## 2018-09-24 NOTE — PROGRESS NOTES
Assessment and Plan:  Problem List Items Addressed This Visit     None        Health Maintenance Due   Topic Date Due    DTaP,Tdap,and Td Vaccines (2 - Td) 10/06/2017    INFLUENZA VACCINE  09/01/2018         HPI:  Patient Active Problem List   Diagnosis    Gastroesophageal reflux disease    Hypothyroid    Hypertension    Hyperlipidemia    GERD (gastroesophageal reflux disease)     Past Medical History:   Diagnosis Date    Arthritis     OA, bilateral resolved 08/09/2016    Balance disorder     resolved 01/06/2017    Chronic constipation     resolved 01/06/2017    Chronic headaches     Colon polyps     Depression     Dermatitis     resolved 01/06/2017    Esophageal reflux     resolved 01/06/2017    Fibromyalgia     Frequent headaches     resolved 01/06/2017    GERD (gastroesophageal reflux disease)     symptomatic-for EGD today 10/31/2016    Heartburn     Herniated lumbar intervertebral disc     Hyperlipidemia     Hypertension     last assessed 08/04/2017    Hypothyroid     IBS (irritable bowel syndrome)     Incontinence     Mobility impaired     uses cane -needs R  TKR    Osteoarthritis, knee     bilateral resolved 01/06/2017    Stress incontinence     resolved 01/06/2017    SAVANNAH (stress urinary incontinence, female)     Trouble swallowing     Varicose veins of bilateral lower extremities with pain     wears support hose    Varicose veins of lower extremity     both with pain resolved 01/06/2017    Wears dentures     full upper   partial lower    Wears glasses      Past Surgical History:   Procedure Laterality Date    ABDOMINAL SURGERY      laparoscopy    BLADDER SURGERY      stimulator then removal    BREAST SURGERY  09/29/2009    R breast-lumpectomy w/ lymph nodes, 9/2/09 lymph node resection right breast 1985 b/l breast surgery resolved 09/02/2009    COLONOSCOPY      DIAGNOSTIC LAPAROSCOPY      ESOPHAGOGASTRODUODENOSCOPY      HYSTERECTOMY      KEVEN    INCISIONAL BREAST BIOPSY      JOINT REPLACEMENT      L TKR    KNEE SURGERY      left knee 1/28/05 and 1994 right knee 7/22/05, resolved 01/28/2005    NECK SURGERY  1997    lymph node resection     NOSE SURGERY      resolved 10/01/2008    OH ESOPHAGOGASTRODUODENOSCOPY TRANSORAL DIAGNOSTIC N/A 12/21/2016    Procedure: ESOPHAGOGASTRODUODENOSCOPY (EGD); Surgeon: Jacob Anderson DO;  Location: [facility]; Service: Gastroenterology    [place] ESOPHAGOGASTRODUODENOSCOPY TRANSORAL DIAGNOSTIC N/A 10/31/2016    Procedure: ESOPHAGOGASTRODUODENOSCOPY (EGD); Surgeon: Jacob Anderson DO;  Location: [facility]; Service: Gastroenterology    SALPINGOOPHORECTOMY Bilateral      Family History   Problem Relation Age of Onset    Breast cancer Mother     Stomach cancer Family     Colon cancer Family     Heart failure Family     Diabetes Family     Hypertension Family     Thyroid disease unspecified Family         disorder     History   Smoking Status    Never Smoker   Smokeless Tobacco    Never Used     History   Alcohol Use No      History   Drug Use No         Current Outpatient Prescriptions   Medication Sig Dispense Refill    amLODIPine (NORVASC) 2 5 mg tablet amlodipine 2 5 mg tablet      Cholecalciferol (VITAMIN D3) 1000 UNIT/SPRAY LIQD Vitamin D3      famotidine (PEPCID) 20 mg tablet Take 1 tablet (20 mg total) by mouth every other day 15 tablet 1    levothyroxine 100 mcg tablet take 1 tablet by mouth once daily 90 tablet 1     No current facility-administered medications for this visit        Allergies   Allergen Reactions    Aciphex [Rabeprazole] Itching, Chest Pain, Shortness Of Breath and GI Intolerance     headache    Benzonatate Other (See Comments), Chest Pain and Shortness Of Breath     Chest pain    Biaxin [Clarithromycin] Other (See Comments), Chest Pain and Nausea Only     Reaction Date: [id number];   Chest pressure    weakness    Doxycycline GI Intolerance, Chest Pain, Itching and Shortness Of Breath    Avelox [Moxifloxacin] GI Intolerance, Chest Pain and Nausea Only     Reaction Date: [id number];     Co Q 10 [Coenzyme Q10]     Codeine GI Intolerance, Chest Pain and Nausea Only     Reaction Date: 08Sep2011;   weakness chest pain    Metoclopramide GI Intolerance    Nizatidine Other (See Comments), Chest Pain, Nausea Only, Vomiting and GI Intolerance     Nausea   Chest pain    Oxycodone GI Intolerance    Oxycodone-Acetaminophen Chest Pain and Nausea Only     Reaction Date: 08Sep2011;     Plecanatide     Pravastatin Other (See Comments), Headache and Nausea Only     headaches    Ranitidine Other (See Comments), Chest Pain and GI Intolerance     chest pain  Arm tingling    Ubidecarenone GI Intolerance    Caffeine Other (See Comments) and Headache     migraines    Chlorhexidine Rash    Latex Itching and Rash     Minor itching to the powder to gloves  Minor itching to the powder to gloves   Levaquin [Levofloxacin] Rash, Chest Pain, Nausea Only, Other (See Comments) and GI Intolerance     Reaction Date: [id number];     Other Rash and Edema     Reaction Date: [id number];   Syncope  Med is amortrpatcl? An antibiotic  Tape    Sulfa Antibiotics Rash     Immunization History   Administered Date(s) Administered    Influenza Split High Dose Preservative Free IM 11/07/2014, 10/22/2015, 11/29/2016, 10/31/2017    Influenza TIV (IM) 10/01/2012    Pneumococcal Conjugate 13-Valent 12/18/2015    Pneumococcal Polysaccharide PPV23 08/25/2014    Tdap 10/06/2007       Patient Care Team:  Shen Hayes as PCP - General (Family Medicine)  Aris Calzada, [address], DO  Vicente Trenton, [address]    Medicare Screening Tests and Risk Assessments:  Leida is here for her Initial Wellness visit  Health Risk Assessment:  Patient rates overall health as good  Patient feels that their physical health rating is Same  Eyesight was rated as Same  Hearing was rated as Same   Patient feels that their emotional and mental health rating is Same  Pain experienced by patient in the last 7 days has been A lot  Patient's pain rating has been 3/10  Emotional/Mental Health:  Patient has been feeling nervous/anxious  PHQ-9 Depression Screening:    Frequency of the following problems over the past two weeks:      1  Little interest or pleasure in doing things: 0 - not at all      2  Feeling down, depressed, or hopeless: 0 - not at all  PHQ-2 Score: 0          Broken Bones/Falls: Fall Risk Assessment:    In the past year, patient has experienced: No history of falling in past year          Bladder/Bowel:  Patient has leaked urine accidently in the last six months  Patient reports no loss of bowel control  (Additional Comments: Per patient sometimes when she urinates there is a small piece of bowel even if she had a bowel movement before )    Immunizations:  Patient has had a flu vaccination within the last year  Patient has received a pneumonia shot  Patient has received a shingles shot  Patient has received tetanus/diphtheria shot  (Additional Comments: Tetanus some time between 2008 and 2010)    Home Safety:  Patient does not have trouble with stairs inside or outside of their home  Patient currently reports that there are no safety hazards present in home, working smoke alarms, working carbon monoxide detectors  Preventative Screenings:   Breast cancer screening performed, 12/25/2017  colon cancer screen completed, 6/25/2017  cholesterol screen completed, no glaucoma eye exam completed    Nutrition:  Current diet: Regular, No Added Salt and Low Saturated Fat with servings of the following:    Medications:  Patient is currently taking over-the-counter supplements  Patient is able to manage medications  Lifestyle Choices:  Patient reports no tobacco use  Patient has not smoked or used tobacco in the past   Patient reports no alcohol use  Patient drives a vehicle  Patient wears seat belt  Activities of Daily Living:  Can get out of bed by his or her self, able to dress self, able to make own meals, able to do own shopping, able to bathe self, can do own laundry/housekeeping, can manage own money, pay bills and track expenses    Previous Hospitalizations:  Hospitalization or ED visit in past 12 months  Number of hospitalizations within the last year: 1-2  Additional Comments: Overnight to monitor the heart  Advanced Directives:  Patient has decided on a power of   Patient has spoken to designated power of   Patient has not completed advanced directive  Preventative Screening/Counseling:      Cardiovascular:      General: Risks and Benefits Discussed and Screening Current      Counseling: Healthy Diet and Healthy Weight     Due for Labs/Analytes/Optional EKG: Lipid Panel          Diabetes:      General: Screening Current and Risks and Benefits Discussed      Counseling: Healthy Weight, Healthy Diet and Improve Physical Activity      Due for labs: Blood Glucose          Colorectal Cancer:      General: Screening Current          Breast Cancer:      General: Screening Current          Cervical Cancer:      General: Screening Not Indicated      Comments: Had hysterectomy        Osteoporosis:      General: Screening Current      Counseling: Calcium and Vitamin D Intake and Regular Weightbearing Exercise      Comments: Had done at [facility]        AAA:      General: Screening Not Indicated          Glaucoma:      General: Screening Current      Comments: Due soon for f/u eye doc appt-goes [facility]        HIV:      General: Screening Not Indicated          Hepatitis C:      General: Screening Not Indicated        Advanced Directives:   Patient has no living will for healthcare, does not have durable POA for healthcare, patient does not have an advanced directive   Additional Comments: Erlinda Glass is POA    Immunizations:      Influenza: Influenza UTD This Year and Influenza Recommended Annually      Pneumococcal: Lifetime Vaccine Completed      Shingrix: Risks & Benefits Discussed      Hepatitis B (Low risk patients): Series Not Indicated      Zostavax: Zostavax Vaccine UTD      TDAP: Risks & Benefits Discussed      Other Preventative Counseling (Non-Medicare): Fall Prevention, Increase physical activity, Nutrition Counseling, Car/seat belt/driving safety reviewed, Skin self-exam and Sunscreen use          No exam data present    Physical Exam:  Review of Systems   Constitutional: Negative for activity change, appetite change and unexpected weight change  HENT:        Mouth sore   Cardiovascular: Positive for chest pain  Due to heartburn   Gastrointestinal: Negative for bowel incontinence  Heartburn   Psychiatric/Behavioral: The patient is not nervous/anxious  Vitals:    09/24/18 1000   Weight: 91 8 kg (202 lb 6 oz)   Body mass index is 33 68 kg/m²  Physical Exam   Constitutional: She appears well-developed and well-nourished  HENT:   Mouth/Throat: No oropharyngeal exudate  Aphthous ulcer r lower buccal mucosa   Neck: No thyromegaly present  Cardiovascular: Normal rate, regular rhythm, normal heart sounds and intact distal pulses  Pulmonary/Chest: Effort normal    Abdominal: Soft  Bowel sounds are normal    Lymphadenopathy:     She has no cervical adenopathy  Psychiatric: She has a normal mood and affect

## 2018-10-01 ENCOUNTER — APPOINTMENT (OUTPATIENT)
Dept: LAB | Facility: MEDICAL CENTER | Age: 68
End: 2018-10-01
Payer: COMMERCIAL

## 2018-10-01 ENCOUNTER — TELEPHONE (OUTPATIENT)
Dept: FAMILY MEDICINE CLINIC | Facility: CLINIC | Age: 68
End: 2018-10-01

## 2018-10-01 DIAGNOSIS — E78.5 HYPERLIPIDEMIA, UNSPECIFIED HYPERLIPIDEMIA TYPE: ICD-10-CM

## 2018-10-01 DIAGNOSIS — E03.9 HYPOTHYROIDISM, UNSPECIFIED TYPE: ICD-10-CM

## 2018-10-01 LAB
ALBUMIN SERPL BCP-MCNC: 3.5 G/DL (ref 3.5–5)
ALP SERPL-CCNC: 91 U/L (ref 46–116)
ALT SERPL W P-5'-P-CCNC: 26 U/L (ref 12–78)
ANION GAP SERPL CALCULATED.3IONS-SCNC: 4 MMOL/L (ref 4–13)
AST SERPL W P-5'-P-CCNC: 18 U/L (ref 5–45)
BILIRUB SERPL-MCNC: 0.61 MG/DL (ref 0.2–1)
BUN SERPL-MCNC: 14 MG/DL (ref 5–25)
CALCIUM SERPL-MCNC: 9.1 MG/DL (ref 8.3–10.1)
CHLORIDE SERPL-SCNC: 103 MMOL/L (ref 100–108)
CHOLEST SERPL-MCNC: 228 MG/DL (ref 50–200)
CO2 SERPL-SCNC: 27 MMOL/L (ref 21–32)
CREAT SERPL-MCNC: 0.85 MG/DL (ref 0.6–1.3)
GFR SERPL CREATININE-BSD FRML MDRD: 70 ML/MIN/1.73SQ M
GLUCOSE P FAST SERPL-MCNC: 86 MG/DL (ref 65–99)
HDLC SERPL-MCNC: 62 MG/DL (ref 40–60)
LDLC SERPL CALC-MCNC: 149 MG/DL (ref 0–100)
POTASSIUM SERPL-SCNC: 4.1 MMOL/L (ref 3.5–5.3)
PROT SERPL-MCNC: 7.5 G/DL (ref 6.4–8.2)
SODIUM SERPL-SCNC: 134 MMOL/L (ref 136–145)
TRIGL SERPL-MCNC: 87 MG/DL
TSH SERPL DL<=0.05 MIU/L-ACNC: 0.41 UIU/ML (ref 0.36–3.74)

## 2018-10-01 PROCEDURE — 36415 COLL VENOUS BLD VENIPUNCTURE: CPT

## 2018-10-01 PROCEDURE — 80061 LIPID PANEL: CPT

## 2018-10-01 PROCEDURE — 80053 COMPREHEN METABOLIC PANEL: CPT

## 2018-10-01 PROCEDURE — 84443 ASSAY THYROID STIM HORMONE: CPT

## 2018-10-01 NOTE — TELEPHONE ENCOUNTER
----- Message from Reddy Croft MD sent at 10/1/2018  4:59 PM EDT -----  lipids up-can she take Zetia?

## 2018-10-02 DIAGNOSIS — E78.5 HYPERLIPIDEMIA, UNSPECIFIED HYPERLIPIDEMIA TYPE: Primary | ICD-10-CM

## 2018-10-02 RX ORDER — EZETIMIBE 10 MG/1
10 TABLET ORAL DAILY
Qty: 30 TABLET | Refills: 5 | Status: SHIPPED | OUTPATIENT
Start: 2018-10-02 | End: 2019-10-08

## 2018-10-08 ENCOUNTER — TELEPHONE (OUTPATIENT)
Dept: FAMILY MEDICINE CLINIC | Facility: CLINIC | Age: 68
End: 2018-10-08

## 2018-10-08 NOTE — TELEPHONE ENCOUNTER
Patient states zetia is making her sick, she gets diarrhea, and stomach ache, she states it only happen twice

## 2018-10-15 ENCOUNTER — HOSPITAL ENCOUNTER (OUTPATIENT)
Dept: NON INVASIVE DIAGNOSTICS | Facility: CLINIC | Age: 68
Discharge: HOME | End: 2018-10-15
Payer: COMMERCIAL

## 2018-10-15 ENCOUNTER — TELEPHONE (OUTPATIENT)
Dept: CARDIOLOGY CLINIC | Facility: CLINIC | Age: 68
End: 2018-10-15

## 2018-10-15 DIAGNOSIS — I44.7 LBBB (LEFT BUNDLE BRANCH BLOCK): ICD-10-CM

## 2018-10-15 DIAGNOSIS — R07.9 CHEST PAIN AT REST: ICD-10-CM

## 2018-10-15 PROCEDURE — 93306 TTE W/DOPPLER COMPLETE: CPT

## 2018-10-15 PROCEDURE — 93306 TTE W/DOPPLER COMPLETE: CPT | Performed by: INTERNAL MEDICINE

## 2018-10-15 NOTE — TELEPHONE ENCOUNTER
Phone call to patient but no answer  I left a message on answering machine that the ECHO was relatively normal with no significant or concerning findings per Dr Phillips Crew

## 2019-01-11 ENCOUNTER — TELEPHONE (OUTPATIENT)
Dept: FAMILY MEDICINE CLINIC | Facility: CLINIC | Age: 69
End: 2019-01-11

## 2019-01-11 DIAGNOSIS — E03.9 HYPOTHYROIDISM, UNSPECIFIED TYPE: ICD-10-CM

## 2019-01-11 RX ORDER — LEVOTHYROXINE SODIUM 0.1 MG/1
100 TABLET ORAL DAILY
Qty: 90 TABLET | Refills: 1 | Status: SHIPPED | OUTPATIENT
Start: 2019-01-11

## 2019-04-08 ENCOUNTER — OFFICE VISIT (OUTPATIENT)
Dept: CARDIOLOGY CLINIC | Facility: CLINIC | Age: 69
End: 2019-04-08
Payer: COMMERCIAL

## 2019-04-08 VITALS
BODY MASS INDEX: 34.49 KG/M2 | SYSTOLIC BLOOD PRESSURE: 124 MMHG | WEIGHT: 207 LBS | DIASTOLIC BLOOD PRESSURE: 66 MMHG | OXYGEN SATURATION: 98 % | HEART RATE: 84 BPM | HEIGHT: 65 IN

## 2019-04-08 DIAGNOSIS — I10 HTN (HYPERTENSION), BENIGN: ICD-10-CM

## 2019-04-08 DIAGNOSIS — R07.9 CHEST PAIN AT REST: ICD-10-CM

## 2019-04-08 DIAGNOSIS — I44.7 LBBB (LEFT BUNDLE BRANCH BLOCK): Primary | ICD-10-CM

## 2019-04-08 PROCEDURE — 99214 OFFICE O/P EST MOD 30 MIN: CPT | Performed by: INTERNAL MEDICINE

## 2019-07-14 DIAGNOSIS — E03.9 HYPOTHYROIDISM, UNSPECIFIED TYPE: ICD-10-CM

## 2019-07-14 RX ORDER — LEVOTHYROXINE SODIUM 0.1 MG/1
TABLET ORAL
Qty: 90 TABLET | Refills: 1 | OUTPATIENT
Start: 2019-07-14

## 2019-10-07 NOTE — PROGRESS NOTES
Cardiology Outpatient Progress Note -  Nydia 78 y o  female MRN: [id number]    @ Encounter: [id number]      Patient Active Problem List    Diagnosis Date Noted    Hypothyroidism      Priority: Low    Hypertension      Priority: Low    Hyperlipidemia      Priority: Low    GERD (gastroesophageal reflux disease)      Priority: Low    Osteopenia 10/16/2017     Priority: Low    PUD (peptic ulcer disease) 10/31/2016     Priority: Low    Osteoarthritis of left knee 2016     Priority: Low    Varicose veins of legs 2015     Priority: Low    Displacement of lumbar intervertebral disc without myelopathy 2013     Priority: Low    Displacement of thoracic intervertebral disc without myelopathy 2013     Priority: Low       Assessment:  # LBBB on EKG  Echo 10/15/18: EF: 55%  Nuclear stress test 18: no ischemia, Breast attenuation defect apically- seen on last stress test and correlated with normal function on echo  Chest pain admit 18: neg trop  # Dyslipidemia: 10/1/18: , HDL 62 , rash with statins- did not want to restart- recommended retry  # PUD  # HTN- norvasc 2 5 mg  # Venous insufficiency- compression stockings  # hypothyroid- on synthroid    TODAY'S PLAN:  Her cardiac workup in past has been normal  Home sleep study due to daytime fatigue    HPI:   77 yo female who I saw about a year ago for abnormal EKG  She has hx of PUD, IBS, HTN, fibromyalgia, OA post left TKR and venous insufficiency with treatment for varicose veins  Had stress test in past which was normal  Father had CAD with cardiac arrest  Older sister  of cardiac arrest  Echo showed EF: 70%  Hospitalization for chest pain Aug 2018- stress test, same small defect apically on last nuclear stress, EF[de-identified] 53%  Pain thought to be due to hiatal hernia    Interval History:   Tired all the time    Review of Systems   Constitutional: Positive for fatigue   Negative for activity change, appetite change and unexpected weight change  HENT: Negative for congestion and nosebleeds  Eyes: Negative  Respiratory: Negative for cough, chest tightness and shortness of breath  Cardiovascular: Negative for chest pain, palpitations and leg swelling  Gastrointestinal: Negative for abdominal distention  Endocrine: Negative  Genitourinary: Negative  Musculoskeletal: Negative  Skin: Negative  Neurological: Negative for dizziness, syncope and weakness  Hematological: Negative  Psychiatric/Behavioral: Negative            Past Medical History:   Diagnosis Date    Arthritis     OA, bilateral resolved 08/09/2016    Balance disorder     resolved 01/06/2017    Chronic constipation     resolved 01/06/2017    Chronic headaches     Colon polyps     Depression     Dermatitis     resolved 01/06/2017    Esophageal reflux     resolved 01/06/2017    Fibromyalgia     Frequent headaches     resolved 01/06/2017    GERD (gastroesophageal reflux disease)     symptomatic-for EGD today 10/31/2016    Heartburn     Herniated lumbar intervertebral disc     Hyperlipidemia     Hypertension     last assessed 08/04/2017    Hypothyroid     IBS (irritable bowel syndrome)     Incontinence     Mobility impaired     uses cane -needs R  TKR    Osteoarthritis, knee     bilateral resolved 01/06/2017    Stress incontinence     resolved 01/06/2017    SAVANNAH (stress urinary incontinence, female)     Trouble swallowing     Varicose veins of bilateral lower extremities with pain     wears support hose    Varicose veins of lower extremity     both with pain resolved 01/06/2017    Wears dentures     full upper   partial lower    Wears glasses          Allergies   Allergen Reactions    Aciphex [Rabeprazole] Itching, Chest Pain, Shortness Of Breath and GI Intolerance     headache    Benzonatate Other (See Comments), Chest Pain and Shortness Of Breath     Chest pain    Biaxin [Clarithromycin] Other (See Comments), Chest Pain and Nausea Only     Reaction Date: 08Sep2011;   Chest pressure    weakness    Doxycycline GI Intolerance, Chest Pain, Itching and Shortness Of Breath    Avelox [Moxifloxacin] GI Intolerance, Chest Pain and Nausea Only     Reaction Date: 08Sep2011;     Co Q 10 [Coenzyme Q10]     Codeine GI Intolerance, Chest Pain and Nausea Only     Reaction Date: 08Sep2011;   weakness chest pain    Metoclopramide GI Intolerance    Nizatidine Other (See Comments), Chest Pain, Nausea Only, Vomiting and GI Intolerance     Nausea   Chest pain    Oxycodone GI Intolerance    Oxycodone-Acetaminophen Chest Pain and Nausea Only     Reaction Date: 08Sep2011;     Plecanatide     Pravastatin Other (See Comments), Headache and Nausea Only     headaches    Ranitidine Other (See Comments), Chest Pain and GI Intolerance     chest pain  Arm tingling    Ubidecarenone GI Intolerance    Caffeine Other (See Comments) and Headache     migraines    Chlorhexidine Rash    Latex Itching and Rash     Minor itching to the powder to gloves  Minor itching to the powder to gloves   Levaquin [Levofloxacin] Rash, Chest Pain, Nausea Only, Other (See Comments) and GI Intolerance     Reaction Date: [id number];     Other Rash and Edema     Reaction Date: [id number];   Syncope  Med is amortrpatcl? An antibiotic  Tape    Sulfa Antibiotics Rash           Current Outpatient Medications:     amLODIPine (NORVASC) 2 5 mg tablet, amlodipine 2 5 mg tablet, Disp: , Rfl:     Cholecalciferol (VITAMIN D3) 1000 UNIT/SPRAY LIQD, Vitamin D3, Disp: , Rfl:     diclofenac sodium (VOLTAREN) 1 %, Apply 4 g topically 4 (four) times a day as needed, Disp: , Rfl:     famotidine (PEPCID) 20 mg tablet, Take 1 tablet (20 mg total) by mouth every other day (Patient taking differently: Take 40 mg by mouth 2 (two) times a day ), Disp: 15 tablet, Rfl: 1    levothyroxine 100 mcg tablet, Take 1 tablet (100 mcg total) by mouth daily, Disp: 90 tablet, Rfl: 1    Multiple Vitamins-Minerals (ALIVE ONCE DAILY WOMENS 50+ PO), Alive Once Daily Women 50 Plus, Disp: , Rfl:     nystatin (MYCOSTATIN) cream, Apply twice a day to rash areas, Disp: , Rfl:     triamcinolone (KENALOG) 0 1 % ointment, Apply topically 2 (two) times a day as needed, Disp: , Rfl:     Social History     Socioeconomic History    Marital status:      Spouse name: Not on file    Number of children: 5    Years of education: Not on file    Highest education level: Not on file   Occupational History    Occupation: retired   Social Needs    Financial resource strain: Not on file    Food insecurity:     Worry: Not on file     Inability: Not on file   [employer] needs:     Medical: Not on file     Non-medical: Not on file   Tobacco Use    Smoking status: Never Smoker    Smokeless tobacco: Never Used   Substance and Sexual Activity    Alcohol use: No    Drug use: No    Sexual activity: Not on file   Lifestyle    Physical activity:     Days per week: Not on file     Minutes per session: Not on file    Stress: Not on file   Relationships    Social connections:     Talks on phone: Not on file     Gets together: Not on file     Attends Christian service: Not on file     Active member of club or organization: Not on file     Attends meetings of clubs or organizations: Not on file     Relationship status: Not on file    Intimate partner violence:     Fear of current or ex partner: Not on file     Emotionally abused: Not on file     Physically abused: Not on file     Forced sexual activity: Not on file   Other Topics Concern    Not on file   Social History Narrative    Denied history of caffeine use    5 children 4 biological 1 adopted    Muslim affiliation Baptism    Uses safety equipment seatbelts       Family History   Problem Relation Age of Onset    Breast cancer Mother     Stomach cancer Family     Colon cancer Family     Heart failure Family     Diabetes Family     Hypertension Family     Thyroid disease unspecified Family         disorder       Physical Exam:    Vitals: Blood pressure 130/72, pulse 63, height 5' 5" (1 651 m), weight 89 4 kg (197 lb 1 6 oz), SpO2 98 %  , Body mass index is 32 8 kg/m² ,   Wt Readings from Last 3 Encounters:   10/08/19 89 4 kg (197 lb 1 6 oz)   04/08/19 93 9 kg (207 lb)   09/24/18 91 8 kg (202 lb 6 oz)       Physical Exam   Constitutional: She is oriented to person, place, and time  She appears well-developed and well-nourished  HENT:   Head: Normocephalic and atraumatic  Eyes: Pupils are equal, round, and reactive to light  EOM are normal    Neck: Normal range of motion  No JVD present  Cardiovascular: Normal rate, regular rhythm and normal heart sounds  No murmur heard  Pulmonary/Chest: Effort normal and breath sounds normal  She has no rales  Abdominal: Soft  Bowel sounds are normal  She exhibits no distension  Musculoskeletal: Normal range of motion  She exhibits no edema  Neurological: She is alert and oriented to person, place, and time  Skin: Skin is warm and dry  She is not diaphoretic  Psychiatric: She has a normal mood and affect           Labs & Results:    Lab Results   Component Value Date    GLUCOSE 89 08/14/2015    CALCIUM 9 1 10/01/2018     08/14/2015    K 4 1 10/01/2018    CO2 27 10/01/2018     10/01/2018    BUN 14 10/01/2018    CREATININE 0 85 10/01/2018     Lab Results   Component Value Date    WBC 5 33 09/01/2018    HGB 13 6 09/01/2018    HCT 41 5 09/01/2018     (H) 09/01/2018     09/01/2018     No results found for: BNP   Lab Results   Component Value Date    CHOL 220 04/28/2015    CHOL 256 11/11/2014     Lab Results   Component Value Date    HDL 62 (H) 10/01/2018    HDL 74 (H) 10/24/2017    HDL 84 (H) 12/03/2016     Lab Results   Component Value Date    LDLCALC 149 (H) 10/01/2018    LDLCALC 118 (H) 10/24/2017    LDLCALC 152 (H) 12/03/2016     Lab Results   Component Value Date    TRIG 87 10/01/2018    TRIG 86 10/24/2017    TRIG 60 12/03/2016     No results found for: CHOLHDL  EKG personally reviewed by Gala Gomez, DO  Counseling / Coordination of Care  Total floor / unit time spent today 25 minutes  Greater than 50% of total time was spent with the patient and / or family counseling and / or coordination of care  A description of the counseling / coordination of care: 15  Thank you for the opportunity to participate in the care of this patient    RENNY CAMACHO [address]

## 2019-10-08 ENCOUNTER — OFFICE VISIT (OUTPATIENT)
Dept: CARDIOLOGY CLINIC | Facility: CLINIC | Age: 69
End: 2019-10-08
Payer: COMMERCIAL

## 2019-10-08 VITALS
WEIGHT: 197.1 LBS | SYSTOLIC BLOOD PRESSURE: 130 MMHG | HEART RATE: 63 BPM | HEIGHT: 65 IN | OXYGEN SATURATION: 98 % | DIASTOLIC BLOOD PRESSURE: 72 MMHG | BODY MASS INDEX: 32.84 KG/M2

## 2019-10-08 DIAGNOSIS — I10 HTN (HYPERTENSION), BENIGN: ICD-10-CM

## 2019-10-08 DIAGNOSIS — R53.83 FATIGUE DUE TO SLEEP PATTERN DISTURBANCE: ICD-10-CM

## 2019-10-08 DIAGNOSIS — G47.9 FATIGUE DUE TO SLEEP PATTERN DISTURBANCE: ICD-10-CM

## 2019-10-08 DIAGNOSIS — E78.5 DYSLIPIDEMIA, GOAL LDL BELOW 100: Primary | ICD-10-CM

## 2019-10-08 DIAGNOSIS — I44.7 LBBB (LEFT BUNDLE BRANCH BLOCK): ICD-10-CM

## 2019-10-08 DIAGNOSIS — I87.2 VENOUS INSUFFICIENCY: ICD-10-CM

## 2019-10-08 PROCEDURE — 3075F SYST BP GE 130 - 139MM HG: CPT | Performed by: INTERNAL MEDICINE

## 2019-10-08 PROCEDURE — 99214 OFFICE O/P EST MOD 30 MIN: CPT | Performed by: INTERNAL MEDICINE

## 2019-10-08 PROCEDURE — 3078F DIAST BP <80 MM HG: CPT | Performed by: INTERNAL MEDICINE

## 2019-10-08 RX ORDER — NYSTATIN 100000 U/G
CREAM TOPICAL
COMMUNITY
Start: 2019-06-27

## 2019-10-14 ENCOUNTER — TELEPHONE (OUTPATIENT)
Dept: SLEEP CENTER | Facility: CLINIC | Age: 69
End: 2019-10-14

## 2019-10-14 NOTE — TELEPHONE ENCOUNTER
----- Message from Mendel Look, DO sent at 10/12/2019  2:44 PM EDT -----  Chart reviewed  Study approved  Schedule HST  Referring physician to provide follow-up care  ----- Message -----  From: Cyrus Lombardi MA  Sent: 10/10/2019   4:13 PM EDT  To: Sleep Medicine Nilda Quiñones Provider    This sleep study needs approval      If approved please sign and return to clerical pool  If denied please include reasons why  Also provide alternative testing if warranted  Please sign and return to clerical pool

## 2019-11-07 ENCOUNTER — HOSPITAL ENCOUNTER (OUTPATIENT)
Dept: SLEEP CENTER | Facility: CLINIC | Age: 69
Discharge: HOME | End: 2019-11-07
Payer: COMMERCIAL

## 2019-11-07 DIAGNOSIS — R53.83 FATIGUE DUE TO SLEEP PATTERN DISTURBANCE: ICD-10-CM

## 2019-11-07 DIAGNOSIS — G47.9 FATIGUE DUE TO SLEEP PATTERN DISTURBANCE: ICD-10-CM

## 2019-11-07 PROCEDURE — G0399 HOME SLEEP TEST/TYPE 3 PORTA: HCPCS

## 2019-11-08 NOTE — PROGRESS NOTES
Home Sleep Study Documentation    Pre-Sleep Home Study:    Set-up and instructions performed by: Kathye Mohs, RST CRT    Technician performed demonstration for Patient: yes    Return demonstration performed by Patient: yes    Written instructions provided to Patient: yes    Patient signed consent form: yes        Post-Sleep Home Study:    Additional comments by Patient: Woke up with neck, head, and sinus hurting    Home Sleep Study Failed:no:    Failure reason: N/A    Reported or Detected: N/A    Scored by: RUDY Mays        2

## 2019-11-12 ENCOUNTER — TELEPHONE (OUTPATIENT)
Dept: SLEEP CENTER | Facility: CLINIC | Age: 69
End: 2019-11-12

## 2020-04-27 ENCOUNTER — TELEMEDICINE (OUTPATIENT)
Dept: CARDIOLOGY CLINIC | Facility: CLINIC | Age: 70
End: 2020-04-27
Payer: COMMERCIAL

## 2020-04-27 VITALS — RESPIRATION RATE: 12 BRPM | WEIGHT: 201.8 LBS | BODY MASS INDEX: 33.58 KG/M2

## 2020-04-27 DIAGNOSIS — E78.2 MIXED HYPERLIPIDEMIA: Primary | ICD-10-CM

## 2020-04-27 DIAGNOSIS — I10 HTN (HYPERTENSION), BENIGN: ICD-10-CM

## 2020-04-27 DIAGNOSIS — I44.7 LBBB (LEFT BUNDLE BRANCH BLOCK): ICD-10-CM

## 2020-04-27 PROCEDURE — 1160F RVW MEDS BY RX/DR IN RCRD: CPT | Performed by: INTERNAL MEDICINE

## 2020-04-27 PROCEDURE — 99213 OFFICE O/P EST LOW 20 MIN: CPT | Performed by: INTERNAL MEDICINE

## 2020-12-03 ENCOUNTER — OFFICE VISIT (OUTPATIENT)
Dept: CARDIOLOGY CLINIC | Facility: CLINIC | Age: 70
End: 2020-12-03
Payer: COMMERCIAL

## 2020-12-03 VITALS
WEIGHT: 202.6 LBS | OXYGEN SATURATION: 98 % | DIASTOLIC BLOOD PRESSURE: 80 MMHG | SYSTOLIC BLOOD PRESSURE: 128 MMHG | HEART RATE: 72 BPM | BODY MASS INDEX: 33.71 KG/M2

## 2020-12-03 DIAGNOSIS — E78.00 PURE HYPERCHOLESTEROLEMIA: Primary | ICD-10-CM

## 2020-12-03 DIAGNOSIS — G47.33 OBSTRUCTIVE SLEEP APNEA SYNDROME: ICD-10-CM

## 2020-12-03 DIAGNOSIS — I10 HTN (HYPERTENSION), BENIGN: ICD-10-CM

## 2020-12-03 PROCEDURE — 99214 OFFICE O/P EST MOD 30 MIN: CPT | Performed by: INTERNAL MEDICINE

## 2020-12-03 RX ORDER — SENNOSIDES 8.6 MG
650 CAPSULE ORAL
COMMUNITY

## 2021-06-13 NOTE — PATIENT INSTRUCTIONS
Increase metoprolol to 12 5 mg (1/2 tablet) twice a day  Keep losartan at 12 5 mg (1/2 tablet) daily  Contact office about the [facility] if you're interested  Please weigh yourself every day and keep a detailed log of weights  Contact the Heart Failure program at [phone] if you gain 3 lbs overnight or 5 lbs in 5-7 days  Limit daily sodium/salt intake to 6566-8272 mg daily to prevent fluid retention  Avoid canned foods, [place] food, and processed meat (hot dogs, lunch meat, and sausage etc )  Limit fluid intake to 2000 mL or 2L (about 60-65 ounces) daily  Bring complete list of medications and log of daily weights to your follow-up appointment     -----------------------------------------------------------------------------------------------------------------------------------------------------------------------------------  Hyperlipidemia     WHAT YOU NEED TO KNOW:   What is hyperlipidemia? Hyperlipidemia is a high level of lipids (fats) in your blood  These lipids include cholesterol or triglycerides  Lipids are made by your body  They also come from the foods you eat  Your body needs lipids to work properly, but high levels increase your risk for heart disease, heart attack, and stroke  What increases my risk for hyperlipidemia? · Family history of high lipid levels  · Diet high in saturated fats, cholesterol, or calories   · High alcohol intake or smoking  · Lack of regular physical activity  · Medical conditions such as hypothyroidism, obesity, or type 2 diabetes  · Certain medicines, such as blood pressure medicines, hormones, and steroids    How is hyperlipidemia managed and treated? Your healthcare provider may first recommend that you make lifestyle changes to help decrease your lipid levels  You may also need to take medicine to lower your lipid levels  Some of the lifestyle changes you may need to make include the following:    · Maintain a healthy weight    Ask your healthcare provider how much you should weigh  Ask him or her to help you create a weight loss plan if you are overweight  Weight loss can decrease your cholesterol and triglyceride levels  · Exercise as directed  Exercise lowers your cholesterol levels and helps you maintain a healthy weight  Get 30 minutes or more of aerobic exercise 4 to 6 days each week  You can split your exercise into four 10-minute workouts instead of 30 minutes at one time  Examples of aerobic exercises include walking briskly, swimming, or riding a bike  Work with your healthcare provider to plan the best exercise program for you  · Do not smoke  Nicotine and other chemicals in cigarettes and cigars can increase your risk for a heart attack and stroke  Ask your healthcare provider for information if you currently smoke and need help to quit  E-cigarettes or smokeless tobacco still contain nicotine  Talk to your healthcare provider before you use these products  · Eat heart-healthy foods  Talk to your dietitian about a heart-healthy diet  The following will help you manage hyperlipidemia:     ? Decrease the total amount of fat you eat  Choose lean meats, fat-free or 1% fat milk, and low-fat dairy products, such as yogurt and cheese  Limit or do not eat red meat  Red meats are high in fat and cholesterol  ? Replace unhealthy fats with healthy fats  Unhealthy fats include saturated fat, trans fat, and cholesterol  Choose soft margarines that are low in saturated fat and have little or no trans fat  Monounsaturated fats are healthy fats  These are found in olive oil, canola oil, avocado, and nuts  Polyunsaturated fats are also healthy  These are found in fish, flaxseed, walnuts, and soybeans  ? Eat 5 or more servings of fruits and vegetables every day  They are low in calories and fat, and a good source of essential vitamins  Include dark green, red, and orange vegetables   Examples include spinach, kale, broccoli, and carrots  ? Eat foods high in fiber  Fiber can help lower your cholesterol levels  Choose whole grain, high-fiber foods  Good choices include whole-wheat breads or cereals, beans, peas, fruits, and vegetables  · Ask your healthcare provider if it is safe for you to drink alcohol  Alcohol can increase your cholesterol and triglyceride levels  A drink of alcohol is 12 ounces of beer, 5 ounces of wine, or 1½ ounces of liquor  Call 911 for any of the following:   · You have any of the following signs of a heart attack:      ? Squeezing, pressure, or pain in your chest  ? You may also have any of the following:   ? Discomfort or pain in your back, neck, jaw, stomach, or arm  ? Shortness of breath  ? Nausea or vomiting  ? Lightheadedness or a sudden cold sweat    · You have any of the following signs of a stroke:    ? Numbness or drooping on one side of your face   ? Weakness in an arm or leg  ? Confusion or difficulty speaking  ? Dizziness, a severe headache, or vision loss    When should I contact my healthcare provider? · You have questions or concerns about your condition or care  CARE AGREEMENT:   You have the right to help plan your care  Learn about your health condition and how it may be treated  Discuss treatment options with your healthcare providers to decide what care you want to receive  You always have the right to refuse treatment  The above information is an  only  It is not intended as medical advice for individual conditions or treatments  Talk to your doctor, nurse or pharmacist before following any medical regimen to see if it is safe and effective for you

## 2021-06-13 NOTE — PROGRESS NOTES
Advanced Heart Failure / Pulmonary Hypertension Outpatient Progress Note    June E Nydia 66 y o  female   MRN: [id number]  Encounter: [id number]    Assessment:  Patient Active Problem List    Diagnosis Date Noted    Obstructive sleep apnea     Fatigue due to sleep pattern disturbance     Hypothyroidism     Hypertension     Hyperlipidemia     GERD (gastroesophageal reflux disease)     Osteopenia 10/16/2017    PUD (peptic ulcer disease) 10/31/2016    Osteoarthritis of left knee 02/22/2016    Varicose veins of legs 03/30/2015    Displacement of lumbar intervertebral disc without myelopathy 06/04/2013    Displacement of thoracic intervertebral disc without myelopathy 06/04/2013       Today's Plan:   Increase metoprolol succinate to 12 5 mg q12 hours   Refills sent to pharmacy as requested   Discussed LifeVest with patient and her son; not interested at this time  Advised to contact office if she should change her mind   Will discuss arranging cMRI with patient's cardiologist (per her request)   Plan to reassess LVEF with limited TTE in late August 2021   Again, discussed restarting statin vs  considering addition of PCSK9 inhibitor;  Patient uninterested at this time  Plan:  Newly diagnosed HFrEF; LVEF 30-40%; LVIDd 5 02 cm; NYHA II/III; ACC/AHA Stage B/C   Etiology: presumed nonischemic  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 in the setting of recent SARS-CoV-2 infection  TTE 10/15/2018: LVEF 50-55%  LVIDd 4 9 cm  Grade 1 DD  Normal RV size and RVSF  Mild MR and TR  PASP 28 mmHg  TTE 05/24/2021 (at [facility], per report): LVEF 30-40%  LVIDd 5 02 cm  Normal RV size and RVSF  Nuclear stress 05/26/2021 (at [facility], per report): "uniform labeling throughout the left ventricular myocardium with no fixed or reversible perfusion defects demonstrated   normal left ventricular wall motion and wall thickening   calculated left ventricular ejection fraction is 63% "   Reviewed importance of low sodium diet and fluid restriction  Weight of 202 lbs in 12/2020  Today, weighs 195 lbs  Most recent BMP from 06/03/2021: sodium 140; potassium 4 3; BUN 12; creatinine 0 89; eGFR 65  Neurohormonal Blockade:  --Beta Blocker: metoprolol succinate 12 5 mg q12 hours  --ARNi / ACEi / ARB: losartan 12 5 mg daily (Entresto cost-prohibitive per [facility] notes)  --Aldosterone Antagonist: No    --SGLT2 Inhibitor: No    --Diuretic: No       Sudden Cardiac Death Risk Reduction:  --LVEF 30% on TTE (per report); LVEF read as 63% on pharm nuc 2 days later (per report)  Electrical Resynchronization:  --Candidacy for BiV device: LBBB with QRS >150 ms  Advanced Therapies: Will continue to monitor  Hypertension   BP of 116/64 mmHg in office today  Continues on medications as above  Hyperlipidemia, mixed   Lipid panel from 10/01/2018: cholesterol 228; TG 87; HDL 62; calculated   Most recent lipid panel from 06/03/2021: cholesterol 245; ; HDL 53; calculated      10-year ASCVD risk score of 15 7%  Not on medical therapy  Unable to tolerate statins (rxn: rash), or red yeast rice (rxn: upset stomach after 3 days use)  Unwilling to restart statin  Left bundle branch block   Nuclear stress test 09/05/2018: "no ischemia, Breast attenuation defect apically- seen on last stress test and correlated with normal function on echo "    Nuclear stress 05/26/2021 (at [facility], per report): "uniform labeling throughout the left ventricular myocardium with no fixed or reversible perfusion defects demonstrated   normal left ventricular wall motion and wall thickening   calculated left ventricular ejection fraction is 63% "    Pulmonary embolism   Diagnosed during 05/2021 admission  CTA chest/PE study 05/21/2021 (at [facility], per report): "subsegmental pulmonary emboli and right lower lobe  Trace right pleural effusion "   Anticoagulation on Eliquis  Obstructive sleep apnea, mild: not prescribed CPAP     History of SARS-CoV-2 infection: requiring hospitalization in 04/2021  Chronic respiratory failure with hypoxia: s/p COVID pneumonia; wearing 2L/min at rest and 4L/min with exertion  GERD  Hypothyroidism  Depression  Fibromyalgia    HPI:   Leida Ren is a 65-year-old woman with a PMH as above who presents to the office for follow-up  From office visit with Dr Jana Pappas on 12/03/2020: "Recommended weight loss for GASTON since it is mild, deferred PSG  Suggested trying red yeast rice for her cholesterol- she tried for 3 days but stopped due to upset stomach  Unwilling to retry statin  No exertional chest pain  Recommend retrial of statin with CoQ10 for 's- unwilling to retry  Wt loss given mild GASTON  Continue norvasc for HTN "    Admitted to [facility] in mid-April 2021 with COVID-19 pneumonia  And was ultimately diagnosed with chronic hypoxic respiratory failure and discharged home on oxygen via nasal cannula  Admitted again to [facility] from 05/21/2021 from 05/27/2021 after presenting with worsening SAMUEL  Patient was found to have persistent bilateral pneumonia, newly diagnosed HFrEF, as well as RLL pulmonary emboli  Underwent nuclear stress test which was reported as normal  Patient started on low-dose losartan and metoprolol succinate by cardiology  For acute PE, patient was started on Eliquis    06/14/2021: Patient presents with her son for hospital follow-up appointment  Reviewed hospital course  Patient's primary complaint/ focuses of right knee pain  Patient also made note of occasional chest discomfort mainly occurring at rest that she describes as a mild central burning sensation that often lasts about 10 minutes and resolves with rest  Introduced and discussed LifeVest and its indications and purpose with patient and her son; patient on interested in proceeding with this at this time  Continues to have visiting nurse in her home 1-2 times weekly      Past Medical History:   Diagnosis Date    Arthritis OA, bilateral resolved 08/09/2016    Balance disorder     resolved 01/06/2017    Chronic constipation     resolved 01/06/2017    Chronic headaches     Colon polyps     Depression     Dermatitis     resolved 01/06/2017    Esophageal reflux     resolved 01/06/2017    Fibromyalgia     Frequent headaches     resolved 01/06/2017    GERD (gastroesophageal reflux disease)     symptomatic-for EGD today 10/31/2016    Heartburn     Herniated lumbar intervertebral disc     Hyperlipidemia     Hypertension     last assessed 08/04/2017    Hypothyroid     IBS (irritable bowel syndrome)     Mobility impaired     uses cane -needs R  TKR    Osteoarthritis, knee     bilateral resolved 01/06/2017    SAVANNAH (stress urinary incontinence, female)     Trouble swallowing     Varicose veins of bilateral lower extremities with pain     wears support hose    Varicose veins of lower extremity     both with pain resolved 01/06/2017    Wears dentures     full upper   partial lower    Wears glasses        Review of Systems   Constitutional: Negative for activity change, appetite change, fatigue, fever and unexpected weight change  HENT: Negative for congestion, postnasal drip, rhinorrhea, sneezing, sore throat and trouble swallowing  Eyes: Negative  Respiratory: Positive for shortness of breath  Negative for cough and chest tightness  Cardiovascular: Negative for chest pain, palpitations and leg swelling  Gastrointestinal: Negative for abdominal distention, abdominal pain, diarrhea, nausea and vomiting  Endocrine: Negative  Genitourinary: Negative for decreased urine volume, difficulty urinating, dysuria, hematuria and urgency  Musculoskeletal: Positive for arthralgias (right knee) and gait problem  Skin: Negative  Allergic/Immunologic: Negative  Neurological: Negative for dizziness, tremors, syncope, weakness, light-headedness and headaches  Hematological: Negative      Psychiatric/Behavioral: Negative for agitation, confusion and sleep disturbance  The patient is not nervous/anxious  14-point ROS completed and negative except as stated above and/or in the HPI  Allergies   Allergen Reactions    Aciphex [Rabeprazole] Itching, Chest Pain, Shortness Of Breath and GI Intolerance     headache    Benzonatate Other (See Comments), Chest Pain and Shortness Of Breath     Chest pain    Biaxin [Clarithromycin] Other (See Comments), Chest Pain and Nausea Only     Reaction Date: [id number];   Chest pressure    weakness    Doxycycline GI Intolerance, Chest Pain, Itching and Shortness Of Breath    Avelox [Moxifloxacin] GI Intolerance, Chest Pain and Nausea Only     Reaction Date: 08Sep2011;     Co Q 10 [Coenzyme Q10]     Codeine GI Intolerance, Chest Pain and Nausea Only     Reaction Date: 08Sep2011;   weakness chest pain    Metoclopramide GI Intolerance    Nizatidine Other (See Comments), Chest Pain, Nausea Only, Vomiting and GI Intolerance     Nausea   Chest pain    Oxycodone GI Intolerance    Oxycodone-Acetaminophen Chest Pain and Nausea Only     Reaction Date: 08Sep2011;     Plecanatide     Pravastatin Other (See Comments), Headache and Nausea Only     headaches    Ranitidine Other (See Comments), Chest Pain and GI Intolerance     chest pain  Arm tingling    Ubidecarenone GI Intolerance    Caffeine - Food Allergy Other (See Comments) and Headache     migraines    Chlorhexidine Rash    Latex Itching and Rash     Minor itching to the powder to gloves  Minor itching to the powder to gloves   Levaquin [Levofloxacin] Rash, Chest Pain, Nausea Only, Other (See Comments) and GI Intolerance     Reaction Date: [id number];     Other Rash and Edema     Reaction Date: [id number];   Syncope  Med is amortrpatcl?  An antibiotic  Tape    Sulfa Antibiotics Rash         Current Outpatient Medications:     Cholecalciferol (VITAMIN D3) 1000 UNIT/SPRAY LIQD, Vitamin D3, Disp: , Rfl:     Eliquis 5 MG, Take 1 tablet (5 mg total) by mouth 2 (two) times a day, Disp: 60 tablet, Rfl: 2    levothyroxine 100 mcg tablet, Take 1 tablet (100 mcg total) by mouth daily, Disp: 90 tablet, Rfl: 1    losartan (COZAAR) 25 mg tablet, Take 0 5 tablets (12 5 mg total) by mouth daily, Disp: 45 tablet, Rfl: 1    metoprolol succinate (TOPROL-XL) 25 mg 24 hr tablet, Take 0 5 tablets (12 5 mg total) by mouth every 12 (twelve) hours, Disp: 90 tablet, Rfl: 1    Multiple Vitamins-Minerals (ALIVE ONCE DAILY WOMENS 50+ PO), Alive Once Daily Women 50 Plus, Disp: , Rfl:     sodium chloride (OCEAN) 0 65 % nasal spray, 1 spray into each nostril as needed for congestion, Disp: , Rfl:     acetaminophen (TYLENOL) 650 mg CR tablet, Take 650 mg by mouth, Disp: , Rfl:     diclofenac sodium (VOLTAREN) 1 %, Apply 4 g topically 4 (four) times a day as needed, Disp: , Rfl:     famotidine (PEPCID) 20 mg tablet, Take 1 tablet (20 mg total) by mouth every other day (Patient not taking: Reported on 12/3/2020), Disp: 15 tablet, Rfl: 1    nystatin (MYCOSTATIN) cream, Apply twice a day to rash areas, Disp: , Rfl:     triamcinolone (KENALOG) 0 1 % ointment, Apply topically 2 (two) times a day as needed, Disp: , Rfl:     Social History     Socioeconomic History    Marital status:      Spouse name: Not on file    Number of children: 11    Years of education: Not on file    Highest education level: Not on file   Occupational History    Occupation: retired   Tobacco Use    Smoking status: Never Smoker    Smokeless tobacco: Never Used   Substance and Sexual Activity    Alcohol use: No    Drug use: No    Sexual activity: Not on file   Other Topics Concern    Not on file   Social History Narrative    Denied history of caffeine use    5 children 4 biological 1 adopted    [employer] affiliation Sikhism    Uses safety equipment seatbelts     Social Determinants of Health     Financial Resource Strain:     Difficulty of Paying Living Expenses:    Food Insecurity:     Worried About Running Out of Food in the Last Year:     [address] in the Last Year:    Transportation Needs:     Lack of Transportation (Medical):  Lack of Transportation (Non-Medical):    Physical Activity:     Days of Exercise per Week:     Minutes of Exercise per Session:    Stress:     Feeling of Stress :    Social Connections:     Frequency of Communication with Friends and Family:     Frequency of Social Gatherings with Friends and Family:     Attends Mosque Services:     Active Member of Clubs or Organizations:     Attends Club or Organization Meetings:     Marital Status:    Intimate Partner Violence:     Fear of Current or Ex-Partner:     Emotionally Abused:     Physically Abused:     Sexually Abused:      Family History   Problem Relation Age of Onset    Breast cancer Mother     Stomach cancer Family     Colon cancer Family     Heart failure Family     Diabetes Family     Hypertension Family     Thyroid disease unspecified Family         disorder       Vitals:   Blood pressure 116/64, pulse 78, weight 88 7 kg (195 lb 9 6 oz), SpO2 98 %  Body mass index is 32 55 kg/m²  Wt Readings from Last 3 Encounters:   06/14/21 88 7 kg (195 lb 9 6 oz)   12/03/20 91 9 kg (202 lb 9 6 oz)   04/27/20 91 5 kg (201 lb 12 8 oz)     Vitals:    06/14/21 1247   BP: 116/64   BP Location: Right arm   Patient Position: Sitting   Cuff Size: Standard   Pulse: 78   SpO2: 98%   Weight: 88 7 kg (195 lb 9 6 oz)       Physical Exam  Vitals reviewed  Constitutional:       General: She is awake  She is not in acute distress  Appearance: Normal appearance  She is well-developed and normal weight  Interventions: Nasal cannula in place  Comments: Face mask present   HENT:      Head: Normocephalic  Nose: Nose normal    Eyes:      General: No scleral icterus  Conjunctiva/sclera: Conjunctivae normal    Neck:      Vascular: No JVD        Trachea: No tracheal deviation  Cardiovascular:      Rate and Rhythm: Normal rate and regular rhythm  No extrasystoles are present  Pulses: Normal pulses  Heart sounds: No murmur heard  Pulmonary:      Effort: Pulmonary effort is normal  No tachypnea, bradypnea, accessory muscle usage or respiratory distress  Breath sounds: Normal air entry  Examination of the right-lower field reveals decreased breath sounds  Decreased breath sounds present  No wheezing or rales  Abdominal:      General: Bowel sounds are normal  There is no distension  Palpations: Abdomen is soft  Tenderness: There is no abdominal tenderness  Musculoskeletal:      Cervical back: Neck supple  Right lower leg: No edema  Left lower leg: No edema  Skin:     General: Skin is warm and dry  Coloration: Skin is not jaundiced or pale  Neurological:      General: No focal deficit present  Mental Status: She is alert and oriented to person, place, and time  Psychiatric:         Attention and Perception: Attention normal          Mood and Affect: Mood and affect normal          Speech: Speech is tangential (at times)  Behavior: Behavior normal  Behavior is cooperative  Thought Content: Thought content normal      Labs & Results:  Lab Results   Component Value Date    WBC 5 33 09/01/2018    HGB 13 6 09/01/2018    HCT 41 5 09/01/2018     (H) 09/01/2018     09/01/2018     Lab Results   Component Value Date    SODIUM 134 (L) 10/01/2018    K 4 1 10/01/2018     10/01/2018    CO2 27 10/01/2018    BUN 14 10/01/2018    CREATININE 0 85 10/01/2018    GLUC 95 09/01/2018    CALCIUM 9 1 10/01/2018     No results found for: INR, PROTIME  No results found for: NTBNP   EKG personally reviewed by CHONG Joseph PA-C

## 2021-06-14 ENCOUNTER — OFFICE VISIT (OUTPATIENT)
Dept: CARDIOLOGY CLINIC | Facility: CLINIC | Age: 71
End: 2021-06-14
Payer: COMMERCIAL

## 2021-06-14 VITALS
SYSTOLIC BLOOD PRESSURE: 116 MMHG | DIASTOLIC BLOOD PRESSURE: 64 MMHG | HEART RATE: 78 BPM | BODY MASS INDEX: 32.55 KG/M2 | WEIGHT: 195.6 LBS | OXYGEN SATURATION: 98 %

## 2021-06-14 DIAGNOSIS — I26.99 ACUTE PULMONARY EMBOLISM WITHOUT ACUTE COR PULMONALE, UNSPECIFIED PULMONARY EMBOLISM TYPE (HCC): ICD-10-CM

## 2021-06-14 DIAGNOSIS — I50.22 CHRONIC HFREF (HEART FAILURE WITH REDUCED EJECTION FRACTION) (HCC): Primary | ICD-10-CM

## 2021-06-14 DIAGNOSIS — I10 HTN (HYPERTENSION), BENIGN: ICD-10-CM

## 2021-06-14 DIAGNOSIS — I44.7 LBBB (LEFT BUNDLE BRANCH BLOCK): ICD-10-CM

## 2021-06-14 DIAGNOSIS — E78.2 MIXED HYPERLIPIDEMIA: ICD-10-CM

## 2021-06-14 DIAGNOSIS — Z09 HOSPITAL DISCHARGE FOLLOW-UP: ICD-10-CM

## 2021-06-14 DIAGNOSIS — G47.33 OBSTRUCTIVE SLEEP APNEA: ICD-10-CM

## 2021-06-14 PROCEDURE — 99214 OFFICE O/P EST MOD 30 MIN: CPT | Performed by: PHYSICIAN ASSISTANT

## 2021-06-14 RX ORDER — METOPROLOL SUCCINATE 25 MG/1
12.5 TABLET, EXTENDED RELEASE ORAL EVERY 12 HOURS SCHEDULED
Qty: 90 TABLET | Refills: 1 | Status: SHIPPED | OUTPATIENT
Start: 2021-06-14 | End: 2021-06-21 | Stop reason: ALTCHOICE

## 2021-06-14 RX ORDER — LOSARTAN POTASSIUM 25 MG/1
12.5 TABLET ORAL DAILY
Qty: 45 TABLET | Refills: 1 | Status: SHIPPED | OUTPATIENT
Start: 2021-06-14 | End: 2022-05-31 | Stop reason: SDUPTHER

## 2021-06-14 RX ORDER — METOPROLOL SUCCINATE 25 MG/1
12.5 TABLET, EXTENDED RELEASE ORAL DAILY
COMMUNITY
Start: 2021-05-27 | End: 2021-06-14 | Stop reason: SDUPTHER

## 2021-06-14 RX ORDER — APIXABAN 5 MG/1
5 TABLET, FILM COATED ORAL 2 TIMES DAILY
COMMUNITY
Start: 2021-05-27 | End: 2021-06-14 | Stop reason: SDUPTHER

## 2021-06-14 RX ORDER — APIXABAN 5 MG/1
5 TABLET, FILM COATED ORAL 2 TIMES DAILY
Qty: 60 TABLET | Refills: 2 | Status: SHIPPED | OUTPATIENT
Start: 2021-06-14 | End: 2022-03-07 | Stop reason: ALTCHOICE

## 2021-06-14 RX ORDER — ECHINACEA PURPUREA EXTRACT 125 MG
1 TABLET ORAL AS NEEDED
COMMUNITY

## 2021-06-14 RX ORDER — LOSARTAN POTASSIUM 25 MG/1
12.5 TABLET ORAL DAILY
COMMUNITY
Start: 2021-05-27 | End: 2021-06-14 | Stop reason: SDUPTHER

## 2021-06-21 ENCOUNTER — TELEPHONE (OUTPATIENT)
Dept: CARDIOLOGY CLINIC | Facility: CLINIC | Age: 71
End: 2021-06-21

## 2021-06-21 DIAGNOSIS — I50.22 CHRONIC SYSTOLIC CHF (CONGESTIVE HEART FAILURE), NYHA CLASS 3 (HCC): Primary | ICD-10-CM

## 2021-06-21 RX ORDER — CARVEDILOL 6.25 MG/1
6.25 TABLET ORAL 2 TIMES DAILY WITH MEALS
Qty: 60 TABLET | Refills: 3 | Status: SHIPPED | OUTPATIENT
Start: 2021-06-21 | End: 2021-07-06

## 2021-06-21 NOTE — TELEPHONE ENCOUNTER
Antonio Bethany saw June last week and Antonio Sims increased her Toprol to 12 5 mg BID, from once daily  June said since increasing to BID, her headaches have become worse  She believes the Toprol always caused headaches but were less frequent when toprol was once a day  She asked if she can return to once daily 12 5 mg   HR 66  Antonio Sims discussed life vest   June said she has an ov with her PCP tomorrow and wants to discuss with him and will then call if she decides to go ahead  June clarencelows up with you on 7/6  Please advise

## 2021-06-21 NOTE — TELEPHONE ENCOUNTER
Leida is willing to switch to Coreg if you want to send it in to the [facility] listed on her chart

## 2021-07-06 ENCOUNTER — OFFICE VISIT (OUTPATIENT)
Dept: CARDIOLOGY CLINIC | Facility: CLINIC | Age: 71
End: 2021-07-06
Payer: COMMERCIAL

## 2021-07-06 VITALS
BODY MASS INDEX: 32.32 KG/M2 | HEART RATE: 76 BPM | SYSTOLIC BLOOD PRESSURE: 108 MMHG | WEIGHT: 194.2 LBS | DIASTOLIC BLOOD PRESSURE: 64 MMHG | OXYGEN SATURATION: 98 %

## 2021-07-06 DIAGNOSIS — Z86.711 HISTORY OF PULMONARY EMBOLISM: ICD-10-CM

## 2021-07-06 DIAGNOSIS — I50.22 CHRONIC HFREF (HEART FAILURE WITH REDUCED EJECTION FRACTION) (HCC): Primary | ICD-10-CM

## 2021-07-06 DIAGNOSIS — I10 HYPERTENSION, UNSPECIFIED TYPE: ICD-10-CM

## 2021-07-06 DIAGNOSIS — I44.7 LBBB (LEFT BUNDLE BRANCH BLOCK): ICD-10-CM

## 2021-07-06 DIAGNOSIS — J96.11 CHRONIC RESPIRATORY FAILURE WITH HYPOXIA (HCC): ICD-10-CM

## 2021-07-06 DIAGNOSIS — E78.2 MIXED HYPERLIPIDEMIA: ICD-10-CM

## 2021-07-06 DIAGNOSIS — G47.33 OBSTRUCTIVE SLEEP APNEA: ICD-10-CM

## 2021-07-06 DIAGNOSIS — Z86.16 HISTORY OF 2019 NOVEL CORONAVIRUS DISEASE (COVID-19): ICD-10-CM

## 2021-07-06 PROCEDURE — 99214 OFFICE O/P EST MOD 30 MIN: CPT | Performed by: PHYSICIAN ASSISTANT

## 2021-07-06 RX ORDER — BISOPROLOL FUMARATE 5 MG/1
5 TABLET ORAL DAILY
Qty: 30 TABLET | Refills: 5 | Status: SHIPPED | OUTPATIENT
Start: 2021-07-06 | End: 2022-05-31 | Stop reason: SDUPTHER

## 2021-07-06 NOTE — PATIENT INSTRUCTIONS
Schedule cardiac MRI  Stop taking Coreg now  Begin taking bisoprolol 5 mg daily starting tomorrow  Consider picking up biotin from your pharmacy for hair loss  Continue daily weights  Please weigh yourself every day and keep a detailed log of weights  Contact the Heart Failure program at [phone] if you gain 3 lbs overnight or 5 lbs in 5-7 days  Limit daily sodium/salt intake to 1996-4355 mg daily to prevent fluid retention  Avoid canned foods, [place] food, and processed meat (hot dogs, lunch meat, and sausage etc )  Limit fluid intake to 2000 mL or 2L (about 60-65 ounces) daily  Bring complete list of medications and log of daily weights to your follow-up appointment

## 2021-07-06 NOTE — PROGRESS NOTES
Advanced Heart Failure / Pulmonary Hypertension Outpatient Progress Note    June E Nydia 66 y o  female   MRN: [id number]  Encounter: [id number]    Assessment:  Patient Active Problem List    Diagnosis Date Noted    Obstructive sleep apnea     Fatigue due to sleep pattern disturbance     Hypothyroidism     Hypertension     Hyperlipidemia     GERD (gastroesophageal reflux disease)     Osteopenia 10/16/2017    PUD (peptic ulcer disease) 10/31/2016    Osteoarthritis of left knee 02/22/2016    Varicose veins of legs 03/30/2015    Displacement of lumbar intervertebral disc without myelopathy 06/04/2013    Displacement of thoracic intervertebral disc without myelopathy 06/04/2013       Today's Plan:   Reports new onset hair loss after starting carvedilol ~2 weeks ago  Stop carvedilol and will start on bisoprolol 5 mg daily   Ordered cMRI   Consider uptitration of losartan at next visit   Plan to reassess LVEF with limited TTE in late August 2021   Plan to repeat CTA chest after at least 3+ months of continuous anticoagulation  Plan:  Chronic HFrEF; LVEF 30-40%; LVIDd 5 02 cm; NYHA II/III; ACC/AHA Stage B   Etiology: presumed nonischemic  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 in the setting of recent SARS-CoV-2 infection  TTE 10/15/2018: LVEF 50-55%  LVIDd 4 9 cm  Grade 1 DD  Normal RV size and RVSF  Mild MR and TR  PASP 28 mmHg  TTE 05/24/2021 (at [facility], per report): LVEF 30-40%  LVIDd 5 02 cm  Normal RV size and RVSF  Nuclear stress 05/26/2021 (at [facility], per report): "uniform labeling throughout the left ventricular myocardium with no fixed or reversible perfusion defects demonstrated   normal left ventricular wall motion and wall thickening   calculated left ventricular ejection fraction is 63% "   Reviewed importance of low sodium diet and fluid restriction  Weight of 195 lbs in 06/14 Today, weighs 194 lbs      Most recent BMP from 06/03/2021: sodium 140; potassium 4 3; BUN 12; creatinine 0 89; eGFR 65  Neurohormonal Blockade:  --Beta Blocker: bisoprolol 5 mg daily (did not tolerate metoprolol and reported acute hair loss with Coreg)  --ARNi / ACEi / ARB: losartan 12 5 mg daily (Entresto cost-prohibitive per [facility] notes)  --Aldosterone Antagonist: No    --SGLT2 Inhibitor: No    --Diuretic: No       Sudden Cardiac Death Risk Reduction:  --LVEF 30% on TTE (per report)  Refused LifeVest     Electrical Resynchronization:  --Candidacy for BiV device: LBBB with QRS >150 ms  Advanced Therapies: Will continue to monitor  Hypertension   BP of 108/64 mmHg in office today  Continues on medications as above  Hyperlipidemia, mixed   Lipid panel from 10/01/2018: cholesterol 228; TG 87; HDL 62; calculated   Most recent lipid panel from 06/03/2021: cholesterol 245; ; HDL 53; calculated      10-year ASCVD risk score of 15 7%  Not on medical therapy  Unable to tolerate statins (rxn: rash), or red yeast rice (rxn: upset stomach after 3 days use)  Unwilling to restart statin  Left bundle branch block   Nuclear stress test 09/05/2018: "no ischemia, Breast attenuation defect apically- seen on last stress test and correlated with normal function on echo "    Nuclear stress test 05/26/2021 (at [facility], per report): "uniform labeling throughout the left ventricular myocardium with no fixed or reversible perfusion defects demonstrated   normal left ventricular wall motion and wall thickening   calculated left ventricular ejection fraction is 63% "    Pulmonary embolism   Diagnosed during 05/2021 admission  CTA chest/PE study 05/21/2021 (at [facility], per report): "subsegmental pulmonary emboli and right lower lobe  Trace right pleural effusion "   Anticoagulation on Eliquis  Obstructive sleep apnea, mild: not prescribed CPAP  History of SARS-CoV-2 infection: requiring hospitalization in 04/2021    Chronic respiratory failure with hypoxia: s/p COVID pneumonia; wearing 2L/min at rest and 4L/min with exertion  GERD  Hypothyroidism  Depression  Fibromyalgia    HPI:   Leida Erazo is a 59-year-old woman with a PMH as above who presents to the office for follow-up  From office visit with Dr Faye Valenzuela on 12/03/2020: "Recommended weight loss for GASTON since it is mild, deferred PSG  Suggested trying red yeast rice for her cholesterol- she tried for 3 days but stopped due to upset stomach  Unwilling to retry statin  No exertional chest pain  Recommend retrial of statin with CoQ10 for 's- unwilling to retry  Wt loss given mild GASTON  Continue norvasc for HTN "    Admitted to [facility] in mid-April 2021 with COVID-19 pneumonia  And was ultimately diagnosed with chronic hypoxic respiratory failure and discharged home on oxygen via nasal cannula  Admitted again to [facility] from 05/21/2021 from 05/27/2021 after presenting with worsening SAMUEL  Patient was found to have persistent bilateral pneumonia, newly diagnosed HFrEF, as well as RLL pulmonary emboli  Underwent nuclear stress test which was reported as normal  Patient started on low-dose losartan and metoprolol succinate by cardiology  For acute PE, patient was started on Eliquis    06/14/2021: Patient presents with her son for hospital follow-up appointment  Reviewed hospital course  Patient's primary complaint/ focuses of right knee pain  Patient also made note of occasional chest discomfort mainly occurring at rest that she describes as a mild central burning sensation that often lasts about 10 minutes and resolves with rest  Introduced and discussed LifeVest and its indications and purpose with patient and her son; patient not interested in proceeding with this at this time  Continues to have visiting nurse in her home 1-2 times weekly  07/06/2021: Patient presents for follow-up  Reports new onset hair loss since switching to Coreg on 06/21  Denies any history of hair loss and denies recent shampoo/conditioner changes   Washes hair 1-2 times weekly  Also reports intermittent episodes of nausea which she believes may be related to her Pepcid  Keeping detailed log of vitals and weights  Has been attempting to complete ADLs without wearing oxygen; has not checked SpO2 when being active  Kristian Bis to get repeat CTA chest      Past Medical History:   Diagnosis Date    Arthritis     OA, bilateral resolved 08/09/2016    Balance disorder     resolved 01/06/2017    Chronic constipation     resolved 01/06/2017    Chronic headaches     Colon polyps     Depression     Dermatitis     resolved 01/06/2017    Esophageal reflux     resolved 01/06/2017    Fibromyalgia     Frequent headaches     resolved 01/06/2017    GERD (gastroesophageal reflux disease)     symptomatic-for EGD today 10/31/2016    Heartburn     Herniated lumbar intervertebral disc     Hyperlipidemia     Hypertension     last assessed 08/04/2017    Hypothyroid     IBS (irritable bowel syndrome)     Mobility impaired     uses cane -needs R  TKR    Osteoarthritis, knee     bilateral resolved 01/06/2017    SAVANNAH (stress urinary incontinence, female)     Trouble swallowing     Varicose veins of bilateral lower extremities with pain     wears support hose    Varicose veins of lower extremity     both with pain resolved 01/06/2017    Wears dentures     full upper   partial lower    Wears glasses        Review of Systems   Constitutional: Negative for activity change, appetite change, fatigue, fever and unexpected weight change  HENT: Negative for congestion, postnasal drip, rhinorrhea, sneezing, sore throat and trouble swallowing  Eyes: Negative  Respiratory: Positive for shortness of breath  Negative for cough and chest tightness  Cardiovascular: Positive for palpitations  Negative for chest pain and leg swelling  Gastrointestinal: Positive for nausea (this AM)  Negative for abdominal distention, abdominal pain, diarrhea and vomiting  Endocrine: Negative  Genitourinary: Negative for decreased urine volume, difficulty urinating, dysuria and urgency  Musculoskeletal: Negative  Skin: Negative  Allergic/Immunologic: Negative  Neurological: Negative for dizziness, tremors, syncope, weakness, light-headedness and headaches  Hematological: Negative  Psychiatric/Behavioral: Negative for agitation, confusion and sleep disturbance  The patient is not nervous/anxious  14-point ROS completed and negative except as stated above and/or in the HPI  Allergies   Allergen Reactions    Aciphex [Rabeprazole] Itching, Chest Pain, Shortness Of Breath and GI Intolerance     headache    Benzonatate Other (See Comments), Chest Pain and Shortness Of Breath     Chest pain    Biaxin [Clarithromycin] Other (See Comments), Chest Pain and Nausea Only     Reaction Date: [id number];   Chest pressure    weakness    Doxycycline GI Intolerance, Chest Pain, Itching and Shortness Of Breath    Avelox [Moxifloxacin] GI Intolerance, Chest Pain and Nausea Only     Reaction Date: 08Sep2011;     Co Q 10 [Coenzyme Q10]     Codeine GI Intolerance, Chest Pain and Nausea Only     Reaction Date: 08Sep2011;   weakness chest pain    Metoclopramide GI Intolerance    Nizatidine Other (See Comments), Chest Pain, Nausea Only, Vomiting and GI Intolerance     Nausea   Chest pain    Oxycodone GI Intolerance    Oxycodone-Acetaminophen Chest Pain and Nausea Only     Reaction Date: 08Sep2011;     Plecanatide     Pravastatin Other (See Comments), Headache and Nausea Only     headaches    Ranitidine Other (See Comments), Chest Pain and GI Intolerance     chest pain  Arm tingling    Ubidecarenone GI Intolerance    Caffeine - Food Allergy Other (See Comments) and Headache     migraines    Chlorhexidine Rash    Latex Itching and Rash     Minor itching to the powder to gloves  Minor itching to the powder to gloves       Levaquin [Levofloxacin] Rash, Chest Pain, Nausea Only, Other (See Comments) and GI Intolerance     Reaction Date: [id number];     Other Rash and Edema     Reaction Date: [id number];   Syncope  Med is amortrpatcl?  An antibiotic  Tape    Sulfa Antibiotics Rash         Current Outpatient Medications:     Cholecalciferol (VITAMIN D3) 1000 UNIT/SPRAY LIQD, Vitamin D3, Disp: , Rfl:     Eliquis 5 MG, Take 1 tablet (5 mg total) by mouth 2 (two) times a day, Disp: 60 tablet, Rfl: 2    famotidine (PEPCID) 20 mg tablet, Take 1 tablet (20 mg total) by mouth every other day, Disp: 15 tablet, Rfl: 1    levothyroxine 100 mcg tablet, Take 1 tablet (100 mcg total) by mouth daily, Disp: 90 tablet, Rfl: 1    losartan (COZAAR) 25 mg tablet, Take 0 5 tablets (12 5 mg total) by mouth daily, Disp: 45 tablet, Rfl: 1    Multiple Vitamins-Minerals (ALIVE ONCE DAILY WOMENS 50+ PO), Alive Once Daily Women 50 Plus, Disp: , Rfl:     nystatin (MYCOSTATIN) cream, Apply twice a day to rash areas, Disp: , Rfl:     sodium chloride (OCEAN) 0 65 % nasal spray, 1 spray into each nostril as needed for congestion, Disp: , Rfl:     acetaminophen (TYLENOL) 650 mg CR tablet, Take 650 mg by mouth (Patient not taking: Reported on 7/6/2021), Disp: , Rfl:     bisoprolol (ZEBETA) 5 mg tablet, Take 1 tablet (5 mg total) by mouth daily, Disp: 30 tablet, Rfl: 5    diclofenac sodium (VOLTAREN) 1 %, Apply 4 g topically 4 (four) times a day as needed, Disp: , Rfl:     triamcinolone (KENALOG) 0 1 % ointment, Apply topically 2 (two) times a day as needed, Disp: , Rfl:     Social History     Socioeconomic History    Marital status:      Spouse name: Not on file    Number of children: 11    Years of education: Not on file    Highest education level: Not on file   Occupational History    Occupation: retired   Tobacco Use    Smoking status: Never Smoker    Smokeless tobacco: Never Used   Substance and Sexual Activity    Alcohol use: No    Drug use: No    Sexual activity: Not on file   Other Topics Concern    Not on file   Social History Narrative    Denied history of caffeine use    5 children 4 biological 1 adopted    B[employer]ism affiliation [employer]    Uses safety equipment seatbelts     Social Determinants of Health     Financial Resource Strain:     Difficulty of Paying Living Expenses:    Food Insecurity:     Worried About Running Out of Food in the Last Year:     Ran Out of Food in the Last Year:    Transportation Needs:     Lack of Transportation (Medical):  Lack of Transportation (Non-Medical):    Physical Activity:     Days of Exercise per Week:     Minutes of Exercise per Session:    Stress:     Feeling of Stress :    Social Connections:     Frequency of Communication with Friends and Family:     Frequency of Social Gatherings with Friends and Family:     Attends Baptism Services:     Active Member of Clubs or Organizations:     Attends Club or Organization Meetings:     Marital Status:    Intimate Partner Violence:     Fear of Current or Ex-Partner:     Emotionally Abused:     Physically Abused:     Sexually Abused:      Family History   Problem Relation Age of Onset    Breast cancer Mother     Stomach cancer Family     Colon cancer Family     Heart failure Family     Diabetes Family     Hypertension Family     Thyroid disease unspecified Family         disorder       Vitals:   Blood pressure 108/64, pulse 76, weight 88 1 kg (194 lb 3 2 oz), SpO2 98 %  Body mass index is 32 32 kg/m²  Wt Readings from Last 3 Encounters:   07/06/21 88 1 kg (194 lb 3 2 oz)   06/14/21 88 7 kg (195 lb 9 6 oz)   12/03/20 91 9 kg (202 lb 9 6 oz)     Vitals:    07/06/21 1303   BP: 108/64   BP Location: Left arm   Patient Position: Sitting   Cuff Size: Standard   Pulse: 76   SpO2: 98%   Weight: 88 1 kg (194 lb 3 2 oz)        Physical Exam  Vitals reviewed  Constitutional:       General: She is awake  She is not in acute distress  Appearance: Normal appearance   She is well-developed and overweight  Interventions: Nasal cannula in place  Comments: Face mask present   HENT:      Head: Normocephalic  Nose: Nose normal       Mouth/Throat:      Mouth: Mucous membranes are moist    Eyes:      General: No scleral icterus  Conjunctiva/sclera: Conjunctivae normal    Neck:      Vascular: No JVD  Trachea: No tracheal deviation  Cardiovascular:      Rate and Rhythm: Normal rate and regular rhythm  No extrasystoles are present  Heart sounds: Murmur heard  Pulmonary:      Effort: Pulmonary effort is normal  No tachypnea, bradypnea, accessory muscle usage or respiratory distress  Breath sounds: Normal air entry  No decreased breath sounds, wheezing or rales  Abdominal:      General: Bowel sounds are normal  There is no distension  Palpations: Abdomen is soft  Tenderness: There is no abdominal tenderness  Musculoskeletal:      Cervical back: Neck supple  Right lower leg: No edema  Left lower leg: No edema  Skin:     General: Skin is warm and dry  Coloration: Skin is not jaundiced or pale  Neurological:      General: No focal deficit present  Mental Status: She is alert and oriented to person, place, and time  Psychiatric:         Attention and Perception: Attention normal          Mood and Affect: Affect normal  Mood is anxious  Speech: Speech is rapid and pressured (at times)  Behavior: Behavior normal  Behavior is cooperative  Thought Content:  Thought content normal      Labs & Results:  Lab Results   Component Value Date    WBC 5 33 09/01/2018    HGB 13 6 09/01/2018    HCT 41 5 09/01/2018     (H) 09/01/2018     09/01/2018     Lab Results   Component Value Date    SODIUM 134 (L) 10/01/2018    K 4 1 10/01/2018     10/01/2018    CO2 27 10/01/2018    BUN 14 10/01/2018    CREATININE 0 85 10/01/2018    GLUC 95 09/01/2018    CALCIUM 9 1 10/01/2018     No results found for: INR, PROTIME  No results found for: NTBNP   EKG personally reviewed by CHONG Saavedra PA-C

## 2021-08-16 ENCOUNTER — HOSPITAL ENCOUNTER (OUTPATIENT)
Dept: RADIOLOGY | Facility: HOSPITAL | Age: 71
Discharge: HOME | End: 2021-08-16
Payer: COMMERCIAL

## 2021-08-16 DIAGNOSIS — I50.22 CHRONIC HFREF (HEART FAILURE WITH REDUCED EJECTION FRACTION) (HCC): ICD-10-CM

## 2021-08-16 PROCEDURE — 75561 CARDIAC MRI FOR MORPH W/DYE: CPT

## 2021-08-16 PROCEDURE — G1004 CDSM NDSC: HCPCS

## 2021-08-16 PROCEDURE — A9585 GADOBUTROL INJECTION: HCPCS | Performed by: PHYSICIAN ASSISTANT

## 2021-08-16 RX ADMIN — GADOBUTROL 18 ML: 604.72 INJECTION INTRAVENOUS at 10:45

## 2021-09-13 ENCOUNTER — OFFICE VISIT (OUTPATIENT)
Dept: CARDIOLOGY CLINIC | Facility: CLINIC | Age: 71
End: 2021-09-13
Payer: COMMERCIAL

## 2021-09-13 VITALS
SYSTOLIC BLOOD PRESSURE: 108 MMHG | BODY MASS INDEX: 34.54 KG/M2 | DIASTOLIC BLOOD PRESSURE: 62 MMHG | HEART RATE: 66 BPM | OXYGEN SATURATION: 99 % | WEIGHT: 195 LBS

## 2021-09-13 DIAGNOSIS — E78.00 PURE HYPERCHOLESTEROLEMIA: Primary | ICD-10-CM

## 2021-09-13 DIAGNOSIS — G47.33 OBSTRUCTIVE SLEEP APNEA: ICD-10-CM

## 2021-09-13 DIAGNOSIS — I10 ESSENTIAL HYPERTENSION: ICD-10-CM

## 2021-09-13 DIAGNOSIS — I42.8 NICM (NONISCHEMIC CARDIOMYOPATHY) (HCC): ICD-10-CM

## 2021-09-13 PROCEDURE — 99214 OFFICE O/P EST MOD 30 MIN: CPT | Performed by: INTERNAL MEDICINE

## 2021-09-13 NOTE — PROGRESS NOTES
Cardiology Outpatient Progress Note - June E Nydia 66 y o  female MRN: [id number]    @ Encounter: [id number]      Patient Active Problem List    Diagnosis Date Noted    Obstructive sleep apnea     Fatigue due to sleep pattern disturbance     Hypothyroidism     Hypertension     Hyperlipidemia     GERD (gastroesophageal reflux disease)     Osteopenia 10/16/2017    PUD (peptic ulcer disease) 10/31/2016    Osteoarthritis of left knee 02/22/2016    Varicose veins of legs 03/30/2015    Displacement of lumbar intervertebral disc without myelopathy 06/04/2013    Displacement of thoracic intervertebral disc without myelopathy 06/04/2013       Assessment:  # Chronic HFmEF, Stage C, NYHA II  Etiology: presumed NICM, possible viral in setting of COVID infection    Weight: 195 lbs  NT proBNP: 4/20/21: 201    Studies- personally reviewed by me  cMR 8/16/21: LVEF: 48%, Thin strip of intramyocardial fibrosis within the basal to mid interventricular septum  These findings are 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, per report): "uniform labeling throughout the left ventricular myocardium with no fixed or reversible perfusion defects demonstrated   normal left ventricular wall motion and wall thickening   calculated left ventricular ejection fraction is 63% "  TTE 05/24/2021 (at [facility], per report): LVEF 30-40%  LVIDd 5 02 cm  Normal RV size and RVSF  TTE 10/15/2018: LVEF 50-55%  LVIDd 4 9 cm  Grade 1 DD  Normal RV size and RVSF  Mild MR and TR  PASP 28 mmHg  Neurohormonal Blockade:  --Beta Blocker: bisoprolol 5 mg daily (did not tolerate metoprolol and reported acute hair loss with Coreg)  --ARNi / ACEi / ARB: losartan 12 5 mg daily (Entresto cost-prohibitive per [facility] notes)   --Aldosterone Antagonist: No    --SGLT2 Inhibitor: No    --Diuretic: No        Sudden Cardiac Death Risk Reduction:  EF now over 35%     Electrical Resynchronization:  --Candidacy for BiV device: LBBB with QRS >150 ms  # LBBB on EKG  Echo 10/15/18: EF: 55%  Nuclear stress test 18: no ischemia, Breast attenuation defect apically- seen on last stress test and correlated with normal function on echo  Chest pain admit 18: neg trop    # Dyslipidemia: not on statin  6/3/21: , HDL 53  20: cholesterol 256, HDL 64,   Said she had upset stomach on red yeast rice, felt she had rash and sick on statin, unwilling to retry  19: , HDL 64 , rash with statins- did not want to restart- recommended retry    # Pulmonary Embolism (may be related to Nataliya Rosa)  Diagnosed during 2021 admit  AC: Eliquis    CTA chest/PE study 2021 (at [facility], per report): "subsegmental pulmonary emboli and right lower lobe  Trace right pleural effusion "    History of SARS-CoV-2 infection: requiring hospitalization in 2021  # PUD  # HTN- norvasc 2 5 mg  # Venous insufficiency- compression stockings  # hypothyroid- on synthroid  # GASTON- Home sleep study 19: mild GASTON, lowest oxygen 86%  # obesity, 201 lbs today  # Raynauds symptoms- cold hands and white on occasion- already on amlodipine  # palpitations- sound very much like PVCs     TODAY'S PLAN:  Recommend retrial of statin with CoQ10 for 's- unwilling to retry  Wt loss given mild GASTON  Continue norvasc for HTN  May be having PVCs as cause of pain     HPI:   69 yo female who I saw about a year ago for abnormal EKG  She has hx of PUD, IBS, HTN, fibromyalgia, OA post left TKR and venous insufficiency with treatment for varicose veins   Had stress test in past which was normal  Father had CAD with cardiac arrest  Older sister  of cardiac arrest  Echo showed EF: 70%    Hospitalization for chest pain Aug 2018- stress test, same small defect apically on last nuclear stress, EF[de-identified] 53%  Pain thought to be due to hiatal hernia  Unable to tolerated statins, tried red yeast rice and did not tolerate as well     Interval History:   Was started on coreg by AP and reported hair loss so coreg stopped and bisoprolol 5 mg added  CMR 8/16/21: LVEF: 48%  RV: normal, Thin strip of intramyocardial fibrosis within the basal to mid interventricular septum  These findings are 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  Still getting very short twinge like chest discomfort  Past Medical History:   Diagnosis Date    Arthritis     OA, bilateral resolved 08/09/2016    Balance disorder     resolved 01/06/2017    Chronic constipation     resolved 01/06/2017    Chronic headaches     Colon polyps     Depression     Dermatitis     resolved 01/06/2017    Esophageal reflux     resolved 01/06/2017    Fibromyalgia     Frequent headaches     resolved 01/06/2017    GERD (gastroesophageal reflux disease)     symptomatic-for EGD today 10/31/2016    Heartburn     Herniated lumbar intervertebral disc     Hyperlipidemia     Hypertension     last assessed 08/04/2017    Hypothyroid     IBS (irritable bowel syndrome)     Mobility impaired     uses cane -needs R  TKR    Osteoarthritis, knee     bilateral resolved 01/06/2017    SAVANNAH (stress urinary incontinence, female)     Trouble swallowing     Varicose veins of bilateral lower extremities with pain     wears support hose    Varicose veins of lower extremity     both with pain resolved 01/06/2017    Wears dentures     full upper   partial lower    Wears glasses        Review of Systems   Constitutional: Negative for activity change, appetite change, fatigue and unexpected weight change  HENT: Negative for congestion and nosebleeds  Eyes: Negative  Respiratory: Negative for cough, chest tightness and shortness of breath  Cardiovascular: Positive for palpitations (sound like PVC)  Negative for chest pain and leg swelling  Gastrointestinal: Negative for abdominal distention  Endocrine: Negative  Genitourinary: Negative  Musculoskeletal: Negative  Skin: Negative  Neurological: Negative for dizziness, syncope and weakness  Hematological: Negative  Psychiatric/Behavioral: Negative  Allergies   Allergen Reactions    Aciphex [Rabeprazole] Itching, Chest Pain, Shortness Of Breath and GI Intolerance     headache    Benzonatate Other (See Comments), Chest Pain and Shortness Of Breath     Chest pain    Biaxin [Clarithromycin] Other (See Comments), Chest Pain and Nausea Only     Reaction Date: [id number];   Chest pressure    weakness    Doxycycline GI Intolerance, Chest Pain, Itching and Shortness Of Breath    Avelox [Moxifloxacin] GI Intolerance, Chest Pain and Nausea Only     Reaction Date: 08Sep2011;     Co Q 10 [Coenzyme Q10]     Codeine GI Intolerance, Chest Pain and Nausea Only     Reaction Date: 08Sep2011;   weakness chest pain    Metoclopramide GI Intolerance    Nizatidine Other (See Comments), Chest Pain, Nausea Only, Vomiting and GI Intolerance     Nausea   Chest pain    Oxycodone GI Intolerance    Oxycodone-Acetaminophen Chest Pain and Nausea Only     Reaction Date: 08Sep2011;     Plecanatide     Pravastatin Other (See Comments), Headache and Nausea Only     headaches    Ranitidine Other (See Comments), Chest Pain and GI Intolerance     chest pain  Arm tingling    Ubidecarenone GI Intolerance    Caffeine - Food Allergy Other (See Comments) and Headache     migraines    Chlorhexidine Rash    Latex Itching and Rash     Minor itching to the powder to gloves  Minor itching to the powder to gloves   Levaquin [Levofloxacin] Rash, Chest Pain, Nausea Only, Other (See Comments) and GI Intolerance     Reaction Date: [id number];     Other Rash and Edema     Reaction Date: [id number];   Syncope  Med is amortrpatcl? An antibiotic  Tape    Sulfa Antibiotics Rash           Current Outpatient Medications:     bisoprolol (ZEBETA) 5 mg tablet, Take 1 tablet (5 mg total) by mouth daily, Disp: 30 tablet, Rfl: 5    Cholecalciferol (VITAMIN D3) 1000 UNIT/SPRAY LIQD, Vitamin D3, Disp: , Rfl:     diclofenac sodium (VOLTAREN) 1 %, Apply 4 g topically 4 (four) times a day as needed, Disp: , Rfl:     Eliquis 5 MG, Take 1 tablet (5 mg total) by mouth 2 (two) times a day, Disp: 60 tablet, Rfl: 2    famotidine (PEPCID) 20 mg tablet, Take 1 tablet (20 mg total) by mouth every other day, Disp: 15 tablet, Rfl: 1    levothyroxine 100 mcg tablet, Take 1 tablet (100 mcg total) by mouth daily, Disp: 90 tablet, Rfl: 1    losartan (COZAAR) 25 mg tablet, Take 0 5 tablets (12 5 mg total) by mouth daily, Disp: 45 tablet, Rfl: 1    Multiple Vitamins-Minerals (ALIVE ONCE DAILY WOMENS 50+ PO), Alive Once Daily Women 50 Plus, Disp: , Rfl:     nystatin (MYCOSTATIN) cream, Apply twice a day to rash areas, Disp: , Rfl:     sodium chloride (OCEAN) 0 65 % nasal spray, 1 spray into each nostril as needed for congestion, Disp: , Rfl:     acetaminophen (TYLENOL) 650 mg CR tablet, Take 650 mg by mouth (Patient not taking: Reported on 7/6/2021), Disp: , Rfl:     triamcinolone (KENALOG) 0 1 % ointment, Apply topically 2 (two) times a day as needed, Disp: , Rfl:     Social History     Socioeconomic History    Marital status:      Spouse name: Not on file    Number of children: 11    Years of education: Not on file    Highest education level: Not on file   Occupational History    Occupation: retired   Tobacco Use    Smoking status: Never Smoker    Smokeless tobacco: Never Used   Substance and Sexual Activity    Alcohol use: No    Drug use: No    Sexual activity: Not on file   Other Topics Concern    Not on file   Social History Narrative    Denied history of caffeine use    5 children 4 biological 1 adopted    Islam affiliation Druze    Uses safety equipment seatbelts     Social Determinants of Health     Financial Resource Strain:     Difficulty of Paying Living Expenses:    Food Insecurity:     Worried About Running Out of Food in the Last Year:    [address] in the Last Year:    Transportation Needs:     Lack of Transportation (Medical):  Lack of Transportation (Non-Medical):    Physical Activity:     Days of Exercise per Week:     Minutes of Exercise per Session:    Stress:     Feeling of Stress :    Social Connections:     Frequency of Communication with Friends and Family:     Frequency of Social Gatherings with Friends and Family:     Attends Anabaptist Services:     Active Member of Clubs or Organizations:     Attends Club or Organization Meetings:     Marital Status:    Intimate Partner Violence:     Fear of Current or Ex-Partner:     Emotionally Abused:     Physically Abused:     Sexually Abused:        Family History   Problem Relation Age of Onset    Breast cancer Mother     Stomach cancer Family     Colon cancer Family     Heart failure Family     Diabetes Family     Hypertension Family     Thyroid disease unspecified Family         disorder       Physical Exam:    Vitals: Blood pressure 108/62, pulse 66, weight 88 5 kg (195 lb), SpO2 99 %  , Body mass index is 34 54 kg/m² ,   Wt Readings from Last 3 Encounters:   09/13/21 88 5 kg (195 lb)   08/16/21 87 1 kg (192 lb)   07/06/21 88 1 kg (194 lb 3 2 oz)       Physical Exam  Constitutional:       Appearance: She is well-developed  HENT:      Head: Normocephalic and atraumatic  Eyes:      Pupils: Pupils are equal, round, and reactive to light  Neck:      Vascular: No JVD  Cardiovascular:      Rate and Rhythm: Normal rate and regular rhythm  Heart sounds: No murmur heard  Pulmonary:      Effort: Pulmonary effort is normal  No respiratory distress  Breath sounds: Normal breath sounds  Abdominal:      General: There is no distension  Palpations: Abdomen is soft  Tenderness: There is no abdominal tenderness  Musculoskeletal:         General: Normal range of motion        Cervical back: Normal range of motion  Skin:     General: Skin is warm and dry  Findings: No rash  Neurological:      Mental Status: She is alert and oriented to person, place, and time  Labs & Results:    Lab Results   Component Value Date    SODIUM 134 (L) 10/01/2018    K 4 1 10/01/2018     10/01/2018    CO2 27 10/01/2018    BUN 14 10/01/2018    CREATININE 0 85 10/01/2018    GLUC 95 09/01/2018    CALCIUM 9 1 10/01/2018     Lab Results   Component Value Date    WBC 5 33 09/01/2018    HGB 13 6 09/01/2018    HCT 41 5 09/01/2018     (H) 09/01/2018     09/01/2018     No results found for: BNP   Lab Results   Component Value Date    CHOLESTEROL 228 (H) 10/01/2018    CHOLESTEROL 209 (H) 10/24/2017    CHOLESTEROL 248 (H) 12/03/2016     Lab Results   Component Value Date    HDL 62 (H) 10/01/2018    HDL 74 (H) 10/24/2017    HDL 84 (H) 12/03/2016     Lab Results   Component Value Date    TRIG 87 10/01/2018    TRIG 86 10/24/2017    TRIG 60 12/03/2016     No results found for: [facility]      EKG personally reviewed by Reed Weston DO  Counseling / Coordination of Care  Time spent today 25 minutes  Greater than 50% of total time was spent with the patient and / or family counseling and / or coordination of care  We discussed diagnoses, most recent studies, tests and any changes in treatment plan  Thank you for the opportunity to participate in the care of this patient      [address] [facility] PULMONARY HYPERTENSION  MEDICAL DIRECTOR OF [place]

## 2022-03-03 NOTE — PROGRESS NOTES
Cardiology Outpatient Progress Note - June E Nydia 66 y o  female MRN: [id number]    @ Encounter: [id number]      Patient Active Problem List    Diagnosis Date Noted    Obstructive sleep apnea     Fatigue due to sleep pattern disturbance     Hypothyroidism     Hypertension     Hyperlipidemia     GERD (gastroesophageal reflux disease)     Osteopenia 10/16/2017    PUD (peptic ulcer disease) 10/31/2016    Osteoarthritis of left knee 02/22/2016    Varicose veins of legs 03/30/2015    Displacement of lumbar intervertebral disc without myelopathy 06/04/2013    Displacement of thoracic intervertebral disc without myelopathy 06/04/2013       Assessment:  # Chronic HFmEF, Stage C, NYHA II  Etiology: presumed NICM, possible viral in setting of COVID infection    Weight: 195 lbs  NT proBNP: 4/20/21: 201    Studies- personally reviewed by me  Echo 12/21/21  LVEF: 35-40%  LVEDd:  RV: normal  PASP: 28 mmHg    cMR 8/16/21: LVEF: 48%, Thin strip of intramyocardial fibrosis within the basal to mid interventricular septum  These findings are 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  Nuclear stress 05/26/2021 (at [facility], per report): "uniform labeling throughout the left ventricular myocardium with no fixed or reversible perfusion defects demonstrated   normal left ventricular wall motion and wall thickening   calculated left ventricular ejection fraction is 63% "  TTE 05/24/2021 (at [facility], per report): LVEF 30-40%  LVIDd 5 02 cm  Normal RV size and RVSF  TTE 10/15/2018: LVEF 50-55%  LVIDd 4 9 cm  Grade 1 DD  Normal RV size and RVSF  Mild MR and TR  PASP 28 mmHg  Neurohormonal Blockade:  --Beta Blocker: bisoprolol 5 mg daily (did not tolerate metoprolol and reported acute hair loss with Coreg)  --ARNi / ACEi / ARB: losartan 12 5 mg daily (Entresto cost-prohibitive per [facility] notes)   --Aldosterone Antagonist: No    --SGLT2 Inhibitor: No    --Diuretic: No        Sudden Cardiac Death Risk Reduction:  EF now over 35%     Electrical Resynchronization:  --Candidacy for BiV device: LBBB with QRS >150 ms  # LBBB on EKG  Echo 10/15/18: EF: 55%  Nuclear stress test 9/5/18: no ischemia, Breast attenuation defect apically- seen on last stress test and correlated with normal function on echo  Chest pain admit 8/31/18: neg trop    # Dyslipidemia: not on statin  6/3/21: , HDL 53  9/5/20: cholesterol 256, HDL 64,   Said she had upset stomach on red yeast rice, felt she had rash and sick on statin, unwilling to retry  12/11/19: , HDL 64 , rash with statins- did not want to restart- recommended retry    # Pulmonary Embolism (may be related to Khari)  Diagnosed during 5/2021 admit  AC: Eliquis- can now stop    CTA Chest 11/15/21: Substantial improvement of previous right lower lobe pulmonary   emboli, with small residual embolus suggested proximally in a right lower lobe   posterior branch  Improvement of previous pulmonary infiltrates  CTA chest/PE study 05/21/2021 (at [facility], per report): "subsegmental pulmonary emboli and right lower lobe  Trace right pleural effusion "    History of SARS-CoV-2 infection: requiring hospitalization in 04/2021  # PUD  # HTN- norvasc 2 5 mg  # Venous insufficiency- compression stockings  # hypothyroid- on synthroid  # GASTON- Home sleep study 11/7/19: mild GASTON, lowest oxygen 86%  # obesity, 201 lbs today  # Raynauds symptoms- cold hands and white on occasion- already on amlodipine  # palpitations- sound very much like PVCs     TODAY'S PLAN:  Recommend retrial of statin with CoQ10 for 's- unwilling to retry  Wt loss given mild GASTON  Continue bisoprol and losartan 12 5 mg for HFrEF  May be having PVCs as cause of pain  Can stop Eliquis given normal RV size and PA pressures on echo Dec 2021 despite CTA in Nov showing tiny residual clot       HPI:   69 yo female who I saw about a year ago for abnormal EKG   She has hx of PUD, IBS, HTN, fibromyalgia, OA post left TKR and venous insufficiency with treatment for varicose veins  Had stress test in past which was normal  Father had CAD with cardiac arrest  Older sister  of cardiac arrest  Echo showed EF: 70%    Hospitalization for chest pain Aug 2018- stress test, same small defect apically on last nuclear stress, EF[de-identified] 53%  Pain thought to be due to hiatal hernia  Unable to tolerated statins, tried red yeast rice and did not tolerate as well     Interval History:   Was started on coreg by AP and reported hair loss so coreg stopped and bisoprolol 5 mg added  CMR 21: LVEF: 48%  RV: normal, Thin strip of intramyocardial fibrosis within the basal to mid interventricular septum  These findings are 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      Still getting very short twinge like chest discomfort  She gets numbness in fingers, body aches, fatigue, which she attributes to her medications    Pulmonary deferred decision for blood thinner to me- is due to pulmonary embolism    Past Medical History:   Diagnosis Date    Arthritis     OA, bilateral resolved 2016    Balance disorder     resolved 2017    Chronic constipation     resolved 2017    Chronic headaches     Colon polyps     Depression     Dermatitis     resolved 2017    Esophageal reflux     resolved 2017    Fibromyalgia     Frequent headaches     resolved 2017    GERD (gastroesophageal reflux disease)     symptomatic-for EGD today 10/31/2016    Heartburn     Herniated lumbar intervertebral disc     Hyperlipidemia     Hypertension     last assessed 2017    Hypothyroid     IBS (irritable bowel syndrome)     Mobility impaired     uses cane -needs R  TKR    Osteoarthritis, knee     bilateral resolved 2017    SAVANNAH (stress urinary incontinence, female)     Trouble swallowing     Varicose veins of bilateral lower extremities with pain     wears support hose    Varicose veins of lower extremity     both with pain resolved 01/06/2017    Wears dentures     full upper   partial lower    Wears glasses        Review of Systems   Constitutional: Negative for activity change, appetite change, fatigue and unexpected weight change  HENT: Negative for congestion and nosebleeds  Eyes: Negative  Respiratory: Negative for cough, chest tightness and shortness of breath  Cardiovascular: Positive for palpitations (sound like PVC)  Negative for chest pain and leg swelling  Gastrointestinal: Negative for abdominal distention  Endocrine: Negative  Genitourinary: Negative  Musculoskeletal: Negative  Skin: Negative  Neurological: Negative for dizziness, syncope and weakness  Hematological: Negative  Psychiatric/Behavioral: Negative          Allergies   Allergen Reactions    Aciphex [Rabeprazole] Itching, Chest Pain, Shortness Of Breath and GI Intolerance     headache    Benzonatate Other (See Comments), Chest Pain and Shortness Of Breath     Chest pain    Biaxin [Clarithromycin] Other (See Comments), Chest Pain and Nausea Only     Reaction Date: [id number];   Chest pressure    weakness    Doxycycline GI Intolerance, Chest Pain, Itching and Shortness Of Breath    Avelox [Moxifloxacin] GI Intolerance, Chest Pain and Nausea Only     Reaction Date: 08Sep2011;     Co Q 10 [Coenzyme Q10]     Codeine GI Intolerance, Chest Pain and Nausea Only     Reaction Date: 08Sep2011;   weakness chest pain    Metoclopramide GI Intolerance    Nizatidine Other (See Comments), Chest Pain, Nausea Only, Vomiting and GI Intolerance     Nausea   Chest pain    Oxycodone GI Intolerance    Oxycodone-Acetaminophen Chest Pain and Nausea Only     Reaction Date: 08Sep2011;     Plecanatide     Pravastatin Other (See Comments), Headache and Nausea Only     headaches    Ranitidine Other (See Comments), Chest Pain and GI Intolerance     chest pain  Arm tingling    Ubidecarenone GI Intolerance  Caffeine - Food Allergy Other (See Comments) and Headache     migraines    Chlorhexidine Rash    Latex Itching and Rash     Minor itching to the powder to gloves  Minor itching to the powder to gloves   Levaquin [Levofloxacin] Rash, Chest Pain, Nausea Only, Other (See Comments) and GI Intolerance     Reaction Date: [id number];     Other Rash and Edema     Reaction Date: [id number];   Syncope  Med is amortrpatcl? An antibiotic  Tape    Sulfa Antibiotics Rash           Current Outpatient Medications:     acetaminophen (TYLENOL) 650 mg CR tablet, Take 650 mg by mouth (Patient not taking: Reported on 7/6/2021), Disp: , Rfl:     bisoprolol (ZEBETA) 5 mg tablet, Take 1 tablet (5 mg total) by mouth daily, Disp: 30 tablet, Rfl: 5    Cholecalciferol (VITAMIN D3) 1000 UNIT/SPRAY LIQD, Vitamin D3, Disp: , Rfl:     diclofenac sodium (VOLTAREN) 1 %, Apply 4 g topically 4 (four) times a day as needed, Disp: , Rfl:     Eliquis 5 MG, Take 1 tablet (5 mg total) by mouth 2 (two) times a day, Disp: 60 tablet, Rfl: 2    famotidine (PEPCID) 20 mg tablet, Take 1 tablet (20 mg total) by mouth every other day, Disp: 15 tablet, Rfl: 1    levothyroxine 100 mcg tablet, Take 1 tablet (100 mcg total) by mouth daily, Disp: 90 tablet, Rfl: 1    losartan (COZAAR) 25 mg tablet, Take 0 5 tablets (12 5 mg total) by mouth daily, Disp: 45 tablet, Rfl: 1    Multiple Vitamins-Minerals (ALIVE ONCE DAILY WOMENS 50+ PO), Alive Once Daily Women 50 Plus, Disp: , Rfl:     nystatin (MYCOSTATIN) cream, Apply twice a day to rash areas, Disp: , Rfl:     sodium chloride (OCEAN) 0 65 % nasal spray, 1 spray into each nostril as needed for congestion, Disp: , Rfl:     triamcinolone (KENALOG) 0 1 % ointment, Apply topically 2 (two) times a day as needed, Disp: , Rfl:     Social History     Socioeconomic History    Marital status:      Spouse name: Not on file    Number of children: 5    Years of education: Not on file    Highest education level: Not on file   Occupational History    Occupation: retired   Tobacco Use    Smoking status: Never Smoker    Smokeless tobacco: Never Used   Substance and Sexual Activity    Alcohol use: No    Drug use: No    Sexual activity: Not on file   Other Topics Concern    Not on file   Social History Narrative    Denied history of caffeine use    5 children 4 biological 1 adopted    Confucianist affiliation Jewish    Uses safety equipment seatbelts     Social Determinants of Health     Financial Resource Strain: Not on file   Food Insecurity: Not on file   Transportation Needs: Not on file   Physical Activity: Not on file   Stress: Not on file   Social Connections: Not on file   Intimate Partner Violence: Not on file   Housing Stability: Not on file       Family History   Problem Relation Age of Onset    Breast cancer Mother     Stomach cancer Family     Colon cancer Family     Heart failure Family     Diabetes Family     Hypertension Family     Thyroid disease unspecified Family         disorder       Physical Exam:    Vitals: There were no vitals taken for this visit  , There is no height or weight on file to calculate BMI ,   Wt Readings from Last 3 Encounters:   09/13/21 88 5 kg (195 lb)   08/16/21 87 1 kg (192 lb)   07/06/21 88 1 kg (194 lb 3 2 oz)       Physical Exam  Constitutional:       Appearance: She is well-developed  HENT:      Head: Normocephalic and atraumatic  Eyes:      Pupils: Pupils are equal, round, and reactive to light  Neck:      Vascular: No JVD  Cardiovascular:      Rate and Rhythm: Normal rate and regular rhythm  Heart sounds: No murmur heard  Pulmonary:      Effort: Pulmonary effort is normal  No respiratory distress  Breath sounds: Normal breath sounds  Abdominal:      General: There is no distension  Palpations: Abdomen is soft  Tenderness: There is no abdominal tenderness  Musculoskeletal:         General: Normal range of motion  Cervical back: Normal range of motion  Skin:     General: Skin is warm and dry  Findings: No rash  Neurological:      Mental Status: She is alert and oriented to person, place, and time  Labs & Results:    Lab Results   Component Value Date    SODIUM 134 (L) 10/01/2018    K 4 1 10/01/2018     10/01/2018    CO2 27 10/01/2018    BUN 14 10/01/2018    CREATININE 0 85 10/01/2018    GLUC 95 09/01/2018    CALCIUM 9 1 10/01/2018     Lab Results   Component Value Date    WBC 5 33 09/01/2018    HGB 13 6 09/01/2018    HCT 41 5 09/01/2018     (H) 09/01/2018     09/01/2018     No results found for: BNP   Lab Results   Component Value Date    CHOLESTEROL 228 (H) 10/01/2018    CHOLESTEROL 209 (H) 10/24/2017    CHOLESTEROL 248 (H) 12/03/2016     Lab Results   Component Value Date    HDL 62 (H) 10/01/2018    HDL 74 (H) 10/24/2017    HDL 84 (H) 12/03/2016     Lab Results   Component Value Date    TRIG 87 10/01/2018    TRIG 86 10/24/2017    TRIG 60 12/03/2016     No results found for: [place]      EKG personally reviewed by Anabela Fonseca DO  Counseling / Coordination of Care  Time spent today 25 minutes  Greater than 50% of total time was spent with the patient and / or family counseling and / or coordination of care  We discussed diagnoses, most recent studies, tests and any changes in treatment plan  Thank you for the opportunity to participate in the care of this patient      [address] [facility] PULMONARY HYPERTENSION  MEDICAL DIRECTOR OF [facility] Hospital[facility]ahman Callaway

## 2022-03-07 ENCOUNTER — OFFICE VISIT (OUTPATIENT)
Dept: CARDIOLOGY CLINIC | Facility: CLINIC | Age: 72
End: 2022-03-07
Payer: COMMERCIAL

## 2022-03-07 ENCOUNTER — TELEPHONE (OUTPATIENT)
Dept: CARDIOLOGY CLINIC | Facility: CLINIC | Age: 72
End: 2022-03-07

## 2022-03-07 VITALS
BODY MASS INDEX: 35.64 KG/M2 | OXYGEN SATURATION: 98 % | WEIGHT: 201.2 LBS | HEART RATE: 67 BPM | SYSTOLIC BLOOD PRESSURE: 118 MMHG | DIASTOLIC BLOOD PRESSURE: 64 MMHG

## 2022-03-07 DIAGNOSIS — G47.33 OBSTRUCTIVE SLEEP APNEA: ICD-10-CM

## 2022-03-07 DIAGNOSIS — I10 HYPERTENSION: ICD-10-CM

## 2022-03-07 DIAGNOSIS — E78.2 MIXED HYPERLIPIDEMIA: Primary | ICD-10-CM

## 2022-03-07 PROCEDURE — 99214 OFFICE O/P EST MOD 30 MIN: CPT | Performed by: INTERNAL MEDICINE

## 2022-03-07 RX ORDER — FAMOTIDINE 20 MG
1 TABLET ORAL DAILY
COMMUNITY

## 2022-05-31 DIAGNOSIS — I50.22 CHRONIC HFREF (HEART FAILURE WITH REDUCED EJECTION FRACTION) (HCC): ICD-10-CM

## 2022-05-31 RX ORDER — BISOPROLOL FUMARATE 5 MG/1
5 TABLET ORAL DAILY
Qty: 90 TABLET | Refills: 3 | Status: SHIPPED | OUTPATIENT
Start: 2022-05-31

## 2022-05-31 RX ORDER — LOSARTAN POTASSIUM 25 MG/1
12.5 TABLET ORAL DAILY
Qty: 45 TABLET | Refills: 3 | Status: SHIPPED | OUTPATIENT
Start: 2022-05-31

## 2022-09-06 NOTE — PROGRESS NOTES
Cardiology Outpatient Progress Note - June E Nydia 67 y o  female MRN: [id number]    @ Encounter: [id number]      Patient Active Problem List    Diagnosis Date Noted    Obstructive sleep apnea     Fatigue due to sleep pattern disturbance     Hypothyroidism     Hypertension     Hyperlipidemia     GERD (gastroesophageal reflux disease)     Osteopenia 10/16/2017    PUD (peptic ulcer disease) 10/31/2016    Osteoarthritis of left knee 02/22/2016    Varicose veins of legs 03/30/2015    Displacement of lumbar intervertebral disc without myelopathy 06/04/2013    Displacement of thoracic intervertebral disc without myelopathy 06/04/2013       Assessment:  # Chronic HFmEF, Stage C, NYHA II  Etiology: presumed NICM, possible viral in setting of COVID infection    Weight: 195 lbs, 205 lbs  NT proBNP: 4/20/21: 201    Studies- personally reviewed by me  Echo 12/21/21  LVEF: 35-40%  LVEDd:  RV: normal  PASP: 28 mmHg    cMR 8/16/21: LVEF: 48%, Thin strip of intramyocardial fibrosis within the basal to mid interventricular septum  These findings are 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  Nuclear stress 05/26/2021 (at [facility], per report): "uniform labeling throughout the left ventricular myocardium with no fixed or reversible perfusion defects demonstrated   normal left ventricular wall motion and wall thickening   calculated left ventricular ejection fraction is 63% "  TTE 05/24/2021 (at [facility], per report): LVEF 30-40%  LVIDd 5 02 cm  Normal RV size and RVSF  TTE 10/15/2018: LVEF 50-55%  LVIDd 4 9 cm  Grade 1 DD  Normal RV size and RVSF  Mild MR and TR  PASP 28 mmHg  Neurohormonal Blockade:  --Beta Blocker: bisoprolol 5 mg daily (did not tolerate metoprolol and reported acute hair loss with Coreg)  --ARNi / ACEi / ARB: losartan 12 5 mg daily (Entresto cost-prohibitive per [facility] notes)   --Aldosterone Antagonist: No    --SGLT2 Inhibitor: No    --Diuretic: No        Sudden Cardiac Death Risk Reduction:  EF now over 35%     Electrical Resynchronization:  --Candidacy for BiV device: LBBB with QRS >150 ms  # LBBB on EKG  Echo 10/15/18: EF: 55%  Nuclear stress test 9/5/18: no ischemia, Breast attenuation defect apically- seen on last stress test and correlated with normal function on echo  Chest pain admit 8/31/18: neg trop    # Dyslipidemia: not on statin  6/3/21: , HDL 53  9/5/20: cholesterol 256, HDL 64,   Said she had upset stomach on red yeast rice, felt she had rash and sick on statin, unwilling to retry  12/11/19: , HDL 64 , rash with statins- did not want to restart- recommended retry    # Pulmonary Embolism (may be related to Khari)  Diagnosed during 5/2021 admit  AC: Eliquis- can now stop    CTA Chest 11/15/21: Substantial improvement of previous right lower lobe pulmonary   emboli, with small residual embolus suggested proximally in a right lower lobe   posterior branch  Improvement of previous pulmonary infiltrates  CTA chest/PE study 05/21/2021 (at [facility], per report): "subsegmental pulmonary emboli and right lower lobe  Trace right pleural effusion "    History of SARS-CoV-2 infection: requiring hospitalization in 04/2021  # PUD  # HTN- norvasc 2 5 mg  # Venous insufficiency- compression stockings  # hypothyroid- on synthroid   low TSH on 3/7/22: 0 34  # GASTON- Home sleep study 11/7/19: mild GASTON, lowest oxygen 86%  # obesity, 201 lbs today  # Raynauds symptoms- cold hands and white on occasion- already on amlodipine  # palpitations- sound very much like PVCs       TODAY'S PLAN:  Recommend retrial of statin with CoQ10 for 's- unwilling to retry  Wt loss given mild GASTON  Continue bisoprol and losartan 12 5 mg for HFrEF- but increase losartan to 25 mg daily  Weight gain seems non fluid  Recheck heart function in December- echo       HPI:   69 yo female who I saw about a year ago for abnormal EKG   She has hx of PUD, IBS, HTN, fibromyalgia, OA post left TKR and venous insufficiency with treatment for varicose veins  Had stress test in past which was normal  Father had CAD with cardiac arrest  Older sister  of cardiac arrest  Echo showed EF: 70%    Hospitalization for chest pain Aug 2018- stress test, same small defect apically on last nuclear stress, EF[de-identified] 53%  Pain thought to be due to hiatal hernia  Unable to tolerated statins, tried red yeast rice and did not tolerate as well  Was started on coreg by AP and reported hair loss so coreg stopped and bisoprolol 5 mg added     Interval History:   Was unwilling to retry statin for 's  Stopped Eliquis on last visit  Wt up 10 lbs  Thinks bisoprolol making her stomach upset    Past Medical History:   Diagnosis Date    Arthritis     OA, bilateral resolved 2016    Balance disorder     resolved 2017    Chronic constipation     resolved 2017    Chronic headaches     Colon polyps     Depression     Dermatitis     resolved 2017    Esophageal reflux     resolved 2017    Fibromyalgia     Frequent headaches     resolved 2017    GERD (gastroesophageal reflux disease)     symptomatic-for EGD today 10/31/2016    Heartburn     Herniated lumbar intervertebral disc     Hyperlipidemia     Hypertension     last assessed 2017    Hypothyroid     IBS (irritable bowel syndrome)     Mobility impaired     uses cane -needs R  TKR    Osteoarthritis, knee     bilateral resolved 2017    SAVANNAH (stress urinary incontinence, female)     Trouble swallowing     Varicose veins of bilateral lower extremities with pain     wears support hose    Varicose veins of lower extremity     both with pain resolved 2017    Wears dentures     full upper   partial lower    Wears glasses        Review of Systems   Constitutional: Negative for activity change, appetite change, fatigue and unexpected weight change (wt up 10 lbs)  HENT: Negative for congestion and nosebleeds  Eyes: Negative  Respiratory: Negative for cough, chest tightness and shortness of breath (wilde- improved)  Cardiovascular: Positive for palpitations (sound like PVC)  Negative for chest pain and leg swelling  Gastrointestinal: Negative for abdominal distention  Endocrine: Negative  Genitourinary: Negative  Musculoskeletal: Negative  Skin: Negative  Neurological: Negative for dizziness, syncope and weakness  Hematological: Negative  Psychiatric/Behavioral: Negative  Allergies   Allergen Reactions    Aciphex [Rabeprazole] Itching, Chest Pain, Shortness Of Breath and GI Intolerance     headache    Benzonatate Other (See Comments), Chest Pain and Shortness Of Breath     Chest pain    Biaxin [Clarithromycin] Other (See Comments), Chest Pain and Nausea Only     Reaction Date: [id number];   Chest pressure    weakness    Doxycycline GI Intolerance, Chest Pain, Itching and Shortness Of Breath    Avelox [Moxifloxacin] GI Intolerance, Chest Pain and Nausea Only     Reaction Date: 08Sep2011;     Co Q 10 [Coenzyme Q10]     Codeine GI Intolerance, Chest Pain and Nausea Only     Reaction Date: 08Sep2011;   weakness chest pain    Metoclopramide GI Intolerance    Nizatidine Other (See Comments), Chest Pain, Nausea Only, Vomiting and GI Intolerance     Nausea   Chest pain    Oxycodone GI Intolerance    Oxycodone-Acetaminophen Chest Pain and Nausea Only     Reaction Date: 08Sep2011;     Plecanatide     Pravastatin Other (See Comments), Headache and Nausea Only     headaches    Ranitidine Other (See Comments), Chest Pain and GI Intolerance     chest pain  Arm tingling    Ubidecarenone GI Intolerance    Caffeine - Food Allergy Other (See Comments) and Headache     migraines    Chlorhexidine Rash    Latex Itching and Rash     Minor itching to the powder to gloves  Minor itching to the powder to gloves       Levaquin [Levofloxacin] Rash, Chest Pain, Nausea Only, Other (See Comments) and GI Intolerance     Reaction Date: [id number];     Other Rash and Edema     Reaction Date: [id number];   Syncope  Med is amortrpatcl? An antibiotic  Tape    Statins Rash    Sulfa Antibiotics Rash           Current Outpatient Medications:     acetaminophen (TYLENOL) 650 mg CR tablet, Take 650 mg by mouth  , Disp: , Rfl:     bisoprolol (ZEBETA) 5 mg tablet, Take 1 tablet (5 mg total) by mouth daily, Disp: 90 tablet, Rfl: 3    famotidine (PEPCID) 20 mg tablet, Take 1 tablet (20 mg total) by mouth every other day, Disp: 15 tablet, Rfl: 1    levothyroxine 100 mcg tablet, Take 1 tablet (100 mcg total) by mouth daily, Disp: 90 tablet, Rfl: 1    losartan (COZAAR) 25 mg tablet, Take 0 5 tablets (12 5 mg total) by mouth daily, Disp: 45 tablet, Rfl: 3    Multiple Vitamins-Minerals (ALIVE ONCE DAILY WOMENS 50+ PO), Alive Once Daily Women 50 Plus, Disp: , Rfl:     Vitamin D, Cholecalciferol, 50 MCG (2000 UT) CAPS, Take 1 tablet by mouth in the morning, Disp: , Rfl:     Cholecalciferol (VITAMIN D3) 1000 UNIT/SPRAY LIQD, Vitamin D3, Disp: , Rfl:     diclofenac sodium (VOLTAREN) 1 %, Apply 4 g topically 4 (four) times a day as needed, Disp: , Rfl:     nystatin (MYCOSTATIN) cream, Apply twice a day to rash areas (Patient not taking: No sig reported), Disp: , Rfl:     sodium chloride (OCEAN) 0 65 % nasal spray, 1 spray into each nostril as needed for congestion (Patient not taking: No sig reported), Disp: , Rfl:     triamcinolone (KENALOG) 0 1 % ointment, Apply topically 2 (two) times a day as needed, Disp: , Rfl:     Social History     Socioeconomic History    Marital status:      Spouse name: Not on file    Number of children: 11    Years of education: Not on file    Highest education level: Not on file   Occupational History    Occupation: retired   Tobacco Use    Smoking status: Never Smoker    Smokeless tobacco: Never Used   Substance and Sexual Activity    Alcohol use: No    Drug use: No    Sexual activity: Not on file   Other Topics Concern    Not on file   Social History Narrative    Denied history of caffeine use    5 children 4 biological 1 adopted    Christian affiliation [employer]    Uses safety equipment seatbelts     Social Determinants of Health     Financial Resource Strain: Not on file   Food Insecurity: Not on file   Transportation Needs: Not on file   Physical Activity: Not on file   Stress: Not on file   Social Connections: Not on file   Intimate Partner Violence: Not on file   Housing Stability: Not on file       Family History   Problem Relation Age of Onset    Breast cancer Mother     Stomach cancer Family     Colon cancer Family     Heart failure Family     Diabetes Family     Hypertension Family     Thyroid disease unspecified Family         disorder       Physical Exam:    Vitals: Blood pressure 130/80, pulse 67, height 5' 5" (1 651 m), weight 93 kg (205 lb), SpO2 97 %  , Body mass index is 34 11 kg/m² ,   Wt Readings from Last 3 Encounters:   09/07/22 93 kg (205 lb)   03/07/22 91 3 kg (201 lb 3 2 oz)   09/13/21 88 5 kg (195 lb)       Physical Exam  Constitutional:       Appearance: She is well-developed  HENT:      Head: Normocephalic and atraumatic  Eyes:      Pupils: Pupils are equal, round, and reactive to light  Neck:      Vascular: No JVD  Cardiovascular:      Rate and Rhythm: Normal rate and regular rhythm  Heart sounds: No murmur heard  Pulmonary:      Effort: Pulmonary effort is normal  No respiratory distress  Breath sounds: Normal breath sounds  Abdominal:      General: There is no distension  Palpations: Abdomen is soft  Tenderness: There is no abdominal tenderness  Musculoskeletal:         General: Normal range of motion  Cervical back: Normal range of motion  Skin:     General: Skin is warm and dry  Findings: No rash  Neurological:      Mental Status: She is alert and oriented to person, place, and time  Labs & Results:    Lab Results   Component Value Date    SODIUM 134 (L) 10/01/2018    K 4 1 10/01/2018     10/01/2018    CO2 27 10/01/2018    BUN 14 10/01/2018    CREATININE 0 85 10/01/2018    GLUC 95 09/01/2018    CALCIUM 9 1 10/01/2018     Lab Results   Component Value Date    WBC 5 33 09/01/2018    HGB 13 6 09/01/2018    HCT 41 5 09/01/2018     (H) 09/01/2018     09/01/2018     No results found for: BNP   Lab Results   Component Value Date    CHOLESTEROL 228 (H) 10/01/2018    CHOLESTEROL 209 (H) 10/24/2017    CHOLESTEROL 248 (H) 12/03/2016     Lab Results   Component Value Date    HDL 62 (H) 10/01/2018    HDL 74 (H) 10/24/2017    HDL 84 (H) 12/03/2016     Lab Results   Component Value Date    TRIG 87 10/01/2018    TRIG 86 10/24/2017    TRIG 60 12/03/2016     No results found for: Ana Luisa      EKG personally reviewed by Sherry Neal DO  Counseling / Coordination of Care  Time spent today 25 minutes  Greater than 50% of total time was spent with the patient and / or family counseling and / or coordination of care  We discussed diagnoses, most recent studies, tests and any changes in treatment plan  Thank you for the opportunity to participate in the care of this patient      [address] [facility] PULMONARY HYPERTENSION  MEDICAL DIRECTOR OF [facility]

## 2022-09-07 ENCOUNTER — OFFICE VISIT (OUTPATIENT)
Dept: CARDIOLOGY CLINIC | Facility: CLINIC | Age: 72
End: 2022-09-07
Payer: COMMERCIAL

## 2022-09-07 VITALS
WEIGHT: 205 LBS | DIASTOLIC BLOOD PRESSURE: 80 MMHG | OXYGEN SATURATION: 97 % | SYSTOLIC BLOOD PRESSURE: 130 MMHG | HEART RATE: 67 BPM | HEIGHT: 65 IN | BODY MASS INDEX: 34.16 KG/M2

## 2022-09-07 DIAGNOSIS — E03.8 OTHER SPECIFIED HYPOTHYROIDISM: ICD-10-CM

## 2022-09-07 DIAGNOSIS — I10 PRIMARY HYPERTENSION: ICD-10-CM

## 2022-09-07 DIAGNOSIS — I27.82 OTHER CHRONIC PULMONARY EMBOLISM WITHOUT ACUTE COR PULMONALE (HCC): ICD-10-CM

## 2022-09-07 DIAGNOSIS — I50.22 CHRONIC SYSTOLIC CHF (CONGESTIVE HEART FAILURE), NYHA CLASS 2 (HCC): ICD-10-CM

## 2022-09-07 DIAGNOSIS — I50.22 CHRONIC HFREF (HEART FAILURE WITH REDUCED EJECTION FRACTION) (HCC): ICD-10-CM

## 2022-09-07 DIAGNOSIS — G47.33 OBSTRUCTIVE SLEEP APNEA: ICD-10-CM

## 2022-09-07 DIAGNOSIS — E78.00 PURE HYPERCHOLESTEROLEMIA: Primary | ICD-10-CM

## 2022-09-07 PROCEDURE — 99214 OFFICE O/P EST MOD 30 MIN: CPT | Performed by: INTERNAL MEDICINE

## 2022-09-07 RX ORDER — LOSARTAN POTASSIUM 25 MG/1
25 TABLET ORAL DAILY
Qty: 90 TABLET | Refills: 3 | Status: SHIPPED | OUTPATIENT
Start: 2022-09-07

## 2022-09-07 NOTE — PATIENT INSTRUCTIONS
Increase losartan to a whole pill 25 mg daily    I ordered an echo for December    Watch your weight gain

## 2022-12-01 ENCOUNTER — HOSPITAL ENCOUNTER (OUTPATIENT)
Dept: NON INVASIVE DIAGNOSTICS | Facility: CLINIC | Age: 72
Discharge: HOME | End: 2022-12-01

## 2022-12-01 VITALS
DIASTOLIC BLOOD PRESSURE: 80 MMHG | SYSTOLIC BLOOD PRESSURE: 130 MMHG | HEIGHT: 65 IN | WEIGHT: 205 LBS | HEART RATE: 60 BPM | BODY MASS INDEX: 34.16 KG/M2

## 2022-12-01 DIAGNOSIS — I50.22 CHRONIC SYSTOLIC CHF (CONGESTIVE HEART FAILURE), NYHA CLASS 2 (HCC): ICD-10-CM

## 2022-12-01 LAB
AORTIC ROOT: 3.2 CM
APICAL FOUR CHAMBER EJECTION FRACTION: 47 %
ASCENDING AORTA: 3.8 CM
E WAVE DECELERATION TIME: 245 MS
FRACTIONAL SHORTENING: 26 (ref 28–44)
INTERVENTRICULAR SEPTUM IN DIASTOLE (PARASTERNAL SHORT AXIS VIEW): 1.3 CM
INTERVENTRICULAR SEPTUM: 1.3 CM (ref 0.6–1.1)
LAAS-AP2: 11.5 CM2
LAAS-AP4: 16.3 CM2
LEFT ATRIUM AREA SYSTOLE SINGLE PLANE A4C: 14.7 CM2
LEFT ATRIUM SIZE: 4.1 CM
LEFT INTERNAL DIMENSION IN SYSTOLE: 3.1 CM (ref 2.1–4)
LEFT VENTRICLE DIASTOLIC VOLUME (MOD BIPLANE): 116 ML
LEFT VENTRICLE SYSTOLIC VOLUME (MOD BIPLANE): 55 ML
LEFT VENTRICULAR INTERNAL DIMENSION IN DIASTOLE: 4.2 CM (ref 3.5–6)
LEFT VENTRICULAR POSTERIOR WALL IN END DIASTOLE: 1.2 CM
LEFT VENTRICULAR STROKE VOLUME: 41 ML
LV EF: 53 %
LVSV (TEICH): 41 ML
MV E'TISSUE VEL-SEP: 9 CM/S
MV PEAK A VEL: 0.73 M/S
MV PEAK E VEL: 72 CM/S
MV STENOSIS PRESSURE HALF TIME: 71 MS
MV VALVE AREA P 1/2 METHOD: 3.1
RA PRESSURE ESTIMATED: 3 MMHG
RIGHT ATRIUM AREA SYSTOLE A4C: 17.9 CM2
RIGHT VENTRICLE ID DIMENSION: 3.3 CM
RV PSP: 29 MMHG
SL CV LEFT ATRIUM LENGTH A2C: 3.5 CM
SL CV LV EF: 55
SL CV PED ECHO LEFT VENTRICLE DIASTOLIC VOLUME (MOD BIPLANE) 2D: 79 ML
SL CV PED ECHO LEFT VENTRICLE SYSTOLIC VOLUME (MOD BIPLANE) 2D: 38 ML
TR MAX PG: 26 MMHG
TR PEAK VELOCITY: 2.6 M/S
TRICUSPID VALVE PEAK REGURGITATION VELOCITY: 2.55 M/S

## 2023-02-02 NOTE — PROGRESS NOTES
Cardiology Outpatient Progress Note - June E Nydia 67 y o  female MRN: [id number]    @ Encounter: [id number]      Patient Active Problem List    Diagnosis Date Noted   • Obstructive sleep apnea    • Fatigue due to sleep pattern disturbance    • Hypothyroidism    • Hypertension    • Hyperlipidemia    • GERD (gastroesophageal reflux disease)    • Osteopenia 10/16/2017   • PUD (peptic ulcer disease) 10/31/2016   • Osteoarthritis of left knee 02/22/2016   • Varicose veins of legs 03/30/2015   • Displacement of lumbar intervertebral disc without myelopathy 06/04/2013   • Displacement of thoracic intervertebral disc without myelopathy 06/04/2013   • Other chronic pulmonary embolism without acute cor pulmonale (HCC) 09/07/2022       Assessment:  # Chronic HFimpEF, Stage C, NYHA II  Etiology: presumed NICM, possible viral in setting of COVID infection    Weight: 195 lbs, 205 lbs  NT proBNP: 4/20/21: 201    Studies- personally reviewed by me  Echo 12/1/22:  LVEF: 55%  RV: normal  PASP: 34 mmHg    Echo 12/21/21  LVEF: 35-40%  LVEDd:  RV: normal  PASP: 28 mmHg    cMR 8/16/21: LVEF: 48%, Thin strip of intramyocardial fibrosis within the basal to mid interventricular septum  These findings are 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  Nuclear stress 05/26/2021 (at [facility], per report): "uniform labeling throughout the left ventricular myocardium with no fixed or reversible perfusion defects demonstrated   normal left ventricular wall motion and wall thickening   calculated left ventricular ejection fraction is 63% "  TTE 05/24/2021 (at [facility], per report): LVEF 30-40%  LVIDd 5 02 cm  Normal RV size and RVSF  TTE 10/15/2018: LVEF 50-55%  LVIDd 4 9 cm  Grade 1 DD  Normal RV size and RVSF  Mild MR and TR  PASP 28 mmHg  Neurohormonal Blockade:  --Beta Blocker: bisoprolol 5 mg daily (did not tolerate metoprolol and reported acute hair loss with Coreg)    --ARNi / ACEi / ARB: losartan 25 mg daily Felix Jimenes cost-prohibitive per [facility] notes)  --Aldosterone Antagonist: No    --SGLT2 Inhibitor: No    --Diuretic: No        Sudden Cardiac Death Risk Reduction:  EF now over 35%     Electrical Resynchronization:  --Candidacy for BiV device: LBBB with QRS >150 ms  # LBBB on EKG  Echo 10/15/18: EF: 55%  Nuclear stress test 9/5/18: no ischemia, Breast attenuation defect apically- seen on last stress test and correlated with normal function on echo  Chest pain admit 8/31/18: neg trop    # Dyslipidemia: not on statin  11/9/22: Tri 175, HDL 59,   6/3/21: , HDL 53  Said she had upset stomach on red yeast rice, felt she had rash and sick on statin, unwilling to retry    # Pulmonary Embolism (may be related to Khari)  Diagnosed during 5/2021 admit  AC: Eliquis- can now stop    CTA Chest 11/15/21: Substantial improvement of previous right lower lobe pulmonary   emboli, with small residual embolus suggested proximally in a right lower lobe   posterior branch  Improvement of previous pulmonary infiltrates  CTA chest/PE study 05/21/2021 (at [facility], per report): "subsegmental pulmonary emboli and right lower lobe  Trace right pleural effusion "    History of SARS-CoV-2 infection: requiring hospitalization in 04/2021  # PUD  # HTN- bisoprolol, losartan  # Venous insufficiency- compression stockings  # hypothyroid- on synthroid   low TSH on 3/7/22: 0 34  # GASTON- Home sleep study 11/7/19: mild GASTON, lowest oxygen 86%  # obesity, 208 lbs today  # Raynauds symptoms- cold hands and white on occasion- already on amlodipine  # palpitations- sound very much like PVCs       TODAY'S PLAN:  Recommend retrial of statin with CoQ10 for 's- unwilling to retry  Willing to try Zetia  Wt loss given mild GASTON  Continue bisoprol and losartan 12 5 mg for HFrEF- but increase losartan to 25 mg daily         HPI:   71 yo female who I saw about a year ago for abnormal EKG   She has hx of PUD, IBS, HTN, fibromyalgia, OA post left TKR and venous insufficiency with treatment for varicose veins  Had stress test in past which was normal  Father had CAD with cardiac arrest  Older sister  of cardiac arrest  Echo showed EF: 70%    Hospitalization for chest pain Aug 2018- stress test, same small defect apically on last nuclear stress, EF[de-identified] 53%  Pain thought to be due to hiatal hernia  Unable to tolerated statins, tried red yeast rice and did not tolerate as well  Was started on coreg by AP and reported hair loss so coreg stopped and bisoprolol 5 mg added     Interval History:   Increased losartan to 25 mg daily  Recommended trial of statin  Wt was up 10 lbs , likely not fluid    Echo 22:  LVEF: 55%  RV: normal  PASP: 29 mmHg    Wt 208 lbs  Past Medical History:   Diagnosis Date   • Arthritis     OA, bilateral resolved 2016   • Balance disorder     resolved 2017   • Chronic constipation     resolved 2017   • Chronic headaches    • Colon polyps    • Depression    • Dermatitis     resolved 2017   • Esophageal reflux     resolved 2017   • Fibromyalgia    • Frequent headaches     resolved 2017   • GERD (gastroesophageal reflux disease)     symptomatic-for EGD today 10/31/2016   • Heartburn    • Herniated lumbar intervertebral disc    • Hyperlipidemia    • Hypertension     last assessed 2017   • Hypothyroid    • IBS (irritable bowel syndrome)    • Mobility impaired     uses cane -needs R  TKR   • Osteoarthritis, knee     bilateral resolved 2017   • SAVANNAH (stress urinary incontinence, female)    • Trouble swallowing    • Varicose veins of bilateral lower extremities with pain     wears support hose   • Varicose veins of lower extremity     both with pain resolved 2017   • Wears dentures     full upper   partial lower   • Wears glasses        Review of Systems   Constitutional: Negative for activity change, appetite change, fatigue and unexpected weight change     HENT: Negative for congestion and nosebleeds  Eyes: Negative  Respiratory: Negative for cough, chest tightness and shortness of breath (wilde- improved)  Cardiovascular: Positive for palpitations (sound like PVC)  Negative for chest pain and leg swelling  Gastrointestinal: Negative for abdominal distention  Endocrine: Negative  Genitourinary: Negative  Musculoskeletal: Negative  Skin: Negative  Neurological: Negative for dizziness, syncope and weakness  Hematological: Negative  Psychiatric/Behavioral: Negative  Allergies   Allergen Reactions   • Aciphex [Rabeprazole] Itching, Chest Pain, Shortness Of Breath and GI Intolerance     headache   • Benzonatate Other (See Comments), Chest Pain and Shortness Of Breath     Chest pain   • Biaxin [Clarithromycin] Other (See Comments), Chest Pain and Nausea Only     Reaction Date: [id number];   Chest pressure    weakness   • Doxycycline GI Intolerance, Chest Pain, Itching and Shortness Of Breath   • Avelox [Moxifloxacin] GI Intolerance, Chest Pain and Nausea Only     Reaction Date: 08Sep2011;    • Co Q 10 [Coenzyme Q10]    • Codeine GI Intolerance, Chest Pain and Nausea Only     Reaction Date: 08Sep2011;   weakness chest pain   • Metoclopramide GI Intolerance   • Nizatidine Other (See Comments), Chest Pain, Nausea Only, Vomiting and GI Intolerance     Nausea   Chest pain   • Oxycodone GI Intolerance   • Oxycodone-Acetaminophen Chest Pain and Nausea Only     Reaction Date: 08Sep2011;    • Plecanatide    • Pravastatin Other (See Comments), Headache and Nausea Only     headaches   • Ranitidine Other (See Comments), Chest Pain and GI Intolerance     chest pain  Arm tingling   • Ubidecarenone GI Intolerance   • Caffeine - Food Allergy Other (See Comments) and Headache     migraines   • Chlorhexidine Rash   • Latex Itching and Rash     Minor itching to the powder to gloves  Minor itching to the powder to gloves      • Levaquin [Levofloxacin] Rash, Chest Pain, Nausea Only, Other (See Comments) and GI Intolerance     Reaction Date: [id number];    • Other Rash and Edema     Reaction Date: [id number];   Syncope  Med is amortrpatcl? An antibiotic  Tape   • Statins Rash   • Sulfa Antibiotics Rash           Current Outpatient Medications:   •  acetaminophen (TYLENOL) 650 mg CR tablet, Take 650 mg by mouth  , Disp: , Rfl:   •  bisoprolol (ZEBETA) 5 mg tablet, Take 1 tablet (5 mg total) by mouth daily, Disp: 90 tablet, Rfl: 3  •  Cholecalciferol (VITAMIN D3) 1000 UNIT/SPRAY LIQD, Vitamin D3, Disp: , Rfl:   •  famotidine (PEPCID) 20 mg tablet, Take 1 tablet (20 mg total) by mouth every other day, Disp: 15 tablet, Rfl: 1  •  levothyroxine 88 mcg tablet, Take 88 mcg by mouth daily, Disp: , Rfl:   •  losartan (COZAAR) 25 mg tablet, Take 1 tablet (25 mg total) by mouth daily, Disp: 90 tablet, Rfl: 3  •  Multiple Vitamins-Minerals (ALIVE ONCE DAILY WOMENS 50+ PO), Alive Once Daily Women 50 Plus, Disp: , Rfl:   •  Vitamin D, Cholecalciferol, 50 MCG (2000 UT) CAPS, Take 1 tablet by mouth in the morning, Disp: , Rfl:   •  diclofenac sodium (VOLTAREN) 1 %, Apply 4 g topically 4 (four) times a day as needed, Disp: , Rfl:   •  nystatin (MYCOSTATIN) cream, Apply twice a day to rash areas (Patient not taking: No sig reported), Disp: , Rfl:   •  sodium chloride (OCEAN) 0 65 % nasal spray, 1 spray into each nostril as needed for congestion (Patient not taking: Reported on 3/7/2022), Disp: , Rfl:   •  triamcinolone (KENALOG) 0 1 % ointment, Apply topically 2 (two) times a day as needed, Disp: , Rfl:     Social History     Socioeconomic History   • Marital status:      Spouse name: Not on file   • Number of children: 5   • Years of education: Not on file   • Highest education level: Not on file   Occupational History   • Occupation: retired   Tobacco Use   • Smoking status: Never   • Smokeless tobacco: Never   Substance and Sexual Activity   • Alcohol use: No   • Drug use: No   • Sexual activity: Not on file   Other Topics Concern   • Not on file   Social History Narrative    Denied history of caffeine use    5 children 4 biological 1 adopted    Nondenominational affiliation Latter day    Uses safety equipment seatbelts     Social Determinants of Health     Financial Resource Strain: Not on file   Food Insecurity: Not on file   Transportation Needs: Not on file   Physical Activity: Not on file   Stress: Not on file   Social Connections: Not on file   Intimate Partner Violence: Not on file   Housing Stability: Not on file       Family History   Problem Relation Age of Onset   • Breast cancer Mother    • Stomach cancer Family    • Colon cancer Family    • Heart failure Family    • Diabetes Family    • Hypertension Family    • Thyroid disease unspecified Family         disorder       Physical Exam:    Vitals: Blood pressure 124/78, pulse 64, weight 94 7 kg (208 lb 11 2 oz), SpO2 98 %  , Body mass index is 34 73 kg/m² ,   Wt Readings from Last 3 Encounters:   02/07/23 94 7 kg (208 lb 11 2 oz)   12/01/22 93 kg (205 lb)   09/07/22 93 kg (205 lb)       Physical Exam  Constitutional:       Appearance: She is well-developed  HENT:      Head: Normocephalic and atraumatic  Eyes:      Pupils: Pupils are equal, round, and reactive to light  Neck:      Vascular: No JVD  Cardiovascular:      Rate and Rhythm: Normal rate and regular rhythm  Heart sounds: No murmur heard  Pulmonary:      Effort: Pulmonary effort is normal  No respiratory distress  Breath sounds: Normal breath sounds  Abdominal:      General: There is no distension  Palpations: Abdomen is soft  Tenderness: There is no abdominal tenderness  Musculoskeletal:         General: Normal range of motion  Cervical back: Normal range of motion  Skin:     General: Skin is warm and dry  Findings: No rash  Neurological:      Mental Status: She is alert and oriented to person, place, and time         Labs & Results:    Lab Results   Component Value Date SODIUM 134 (L) 10/01/2018    K 4 1 10/01/2018     10/01/2018    CO2 27 10/01/2018    BUN 14 10/01/2018    CREATININE 0 85 10/01/2018    GLUC 95 09/01/2018    CALCIUM 9 1 10/01/2018     Lab Results   Component Value Date    WBC 5 33 09/01/2018    HGB 13 6 09/01/2018    HCT 41 5 09/01/2018     (H) 09/01/2018     09/01/2018     No results found for: BNP   Lab Results   Component Value Date    CHOLESTEROL 228 (H) 10/01/2018    CHOLESTEROL 209 (H) 10/24/2017    CHOLESTEROL 248 (H) 12/03/2016     Lab Results   Component Value Date    HDL 62 (H) 10/01/2018    HDL 74 (H) 10/24/2017    HDL 84 (H) 12/03/2016     Lab Results   Component Value Date    TRIG 87 10/01/2018    TRIG 86 10/24/2017    TRIG 60 12/03/2016     No results found for: Ana Luisa    EKG personally reviewed by Naa Overall  Counseling / Coordination of Care  Time spent today 25 minutes  Greater than 50% of total time was spent with the patient and / or family counseling and / or coordination of care  We discussed diagnoses, most recent studies, tests and any changes in treatment plan  Thank you for the opportunity to participate in the care of this patient      [address] [facility] PULMONARY HYPERTENSION  MEDICAL DIRECTOR OF [place]

## 2023-02-07 ENCOUNTER — OFFICE VISIT (OUTPATIENT)
Dept: CARDIOLOGY CLINIC | Facility: CLINIC | Age: 73
End: 2023-02-07

## 2023-02-07 VITALS
BODY MASS INDEX: 34.73 KG/M2 | OXYGEN SATURATION: 98 % | SYSTOLIC BLOOD PRESSURE: 124 MMHG | HEART RATE: 64 BPM | DIASTOLIC BLOOD PRESSURE: 78 MMHG | WEIGHT: 208.7 LBS

## 2023-02-07 DIAGNOSIS — I27.82 OTHER CHRONIC PULMONARY EMBOLISM WITHOUT ACUTE COR PULMONALE (HCC): ICD-10-CM

## 2023-02-07 DIAGNOSIS — I10 HTN (HYPERTENSION), BENIGN: ICD-10-CM

## 2023-02-07 DIAGNOSIS — E78.5 HYPERLIPIDEMIA: Primary | ICD-10-CM

## 2023-02-07 DIAGNOSIS — I42.8 NICM (NONISCHEMIC CARDIOMYOPATHY) (HCC): ICD-10-CM

## 2023-02-07 RX ORDER — LEVOTHYROXINE SODIUM 88 UG/1
88 TABLET ORAL DAILY
COMMUNITY
Start: 2023-01-14

## 2023-02-07 RX ORDER — EZETIMIBE 10 MG/1
10 TABLET ORAL DAILY
Qty: 30 TABLET | Refills: 4 | Status: SHIPPED | OUTPATIENT
Start: 2023-02-07

## 2023-02-27 ENCOUNTER — TELEPHONE (OUTPATIENT)
Dept: CARDIOLOGY CLINIC | Facility: CLINIC | Age: 73
End: 2023-02-27

## 2023-02-27 DIAGNOSIS — I50.22 CHRONIC HFREF (HEART FAILURE WITH REDUCED EJECTION FRACTION) (HCC): ICD-10-CM

## 2023-02-27 RX ORDER — BISOPROLOL FUMARATE 5 MG/1
5 TABLET, FILM COATED ORAL DAILY
Qty: 90 TABLET | Refills: 3 | Status: SHIPPED | OUTPATIENT
Start: 2023-02-27

## 2023-05-30 DIAGNOSIS — I50.22 CHRONIC HFREF (HEART FAILURE WITH REDUCED EJECTION FRACTION) (HCC): ICD-10-CM

## 2023-05-30 RX ORDER — BISOPROLOL FUMARATE 5 MG/1
5 TABLET, FILM COATED ORAL DAILY
Qty: 90 TABLET | Refills: 3 | Status: SHIPPED | OUTPATIENT
Start: 2023-05-30

## 2023-09-08 NOTE — PROGRESS NOTES
Cardiology Outpatient Progress Note - June E Nydia 75 y.o. female MRN: [id number]    @ Encounter: [id number]      Patient Active Problem List    Diagnosis Date Noted   • Obstructive sleep apnea    • Fatigue due to sleep pattern disturbance    • Hypothyroidism    • Hypertension    • Hyperlipidemia    • GERD (gastroesophageal reflux disease)    • Osteopenia 10/16/2017   • PUD (peptic ulcer disease) 10/31/2016   • Osteoarthritis of left knee 02/22/2016   • Varicose veins of legs 03/30/2015   • Displacement of lumbar intervertebral disc without myelopathy 06/04/2013   • Displacement of thoracic intervertebral disc without myelopathy 06/04/2013   • Other chronic pulmonary embolism without acute cor pulmonale ([address]) 09/07/2022       Assessment:  # Chronic HFimpEF, Stage C, NYHA II  Etiology: presumed NICM, possible viral in setting of COVID infection    Weight: 205 lbs  NT proBNP: 4/20/21: 201    Studies- personally reviewed by me  Echo 12/1/22:  LVEF: 55%  RV: normal  PASP: 34 mmHg    Echo 12/21/21  LVEF: 35-40%  LVEDd:  RV: normal  PASP: 28 mmHg    cMR 8/16/21: LVEF: 48%, Thin strip of intramyocardial fibrosis within the basal to mid interventricular septum. 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 Nuclear stress 05/26/2021 (at [facility], per report): "uniform labeling throughout the left ventricular myocardium with no fixed or reversible perfusion defects demonstrated. ..normal left ventricular wall motion and wall thickening. ..calculated left ventricular ejection fraction is 63%."  TTE 05/24/2021 (at [facility], per report): LVEF 30-40%. LVIDd 5.02 cm. Normal RV size and RVSF. TTE 10/15/2018: LVEF 50-55%. LVIDd 4.9 cm. Grade 1 DD. Normal RV size and RVSF. Mild MR and TR. PASP 28 mmHg. Neurohormonal Blockade:  --Beta Blocker: bisoprolol 5 mg daily (did not tolerate metoprolol and reported acute hair loss with Coreg).   --ARNi / ACEi / ARB: losartan 25 mg daily Valeri Closs cost-prohibitive per [facility] notes). --Aldosterone Antagonist: No.   --SGLT2 Inhibitor: No.   --Diuretic: No.       Sudden Cardiac Death Risk Reduction:  EF now over 35%     Electrical Resynchronization:  --Candidacy for BiV device: LBBB with QRS >150 ms. # LBBB on EKG  Echo 10/15/18: EF: 55%  Nuclear stress test 9/5/18: no ischemia, Breast attenuation defect apically- seen on last stress test and correlated with normal function on echo  Chest pain admit 8/31/18: neg trop    # Dyslipidemia: not on statin  11/9/22: Tri 175, HDL 59,   6/3/21: , HDL 53  Said she had upset stomach on red yeast rice, felt she had rash and sick on statin, unwilling to retry  Zetia - states gave her headaches    # Pulmonary Embolism (may be related to [facility])  Diagnosed during 5/2021 admit  AC: Eliquis- can now stop    CTA Chest 11/15/21: Substantial improvement of previous right lower lobe pulmonary   emboli, with small residual embolus suggested proximally in a right lower lobe   posterior branch. Improvement of previous pulmonary infiltrates. CTA chest/PE study 05/21/2021 (at [facility], per report): "subsegmental pulmonary emboli and right lower lobe. Trace right pleural effusion."    History of SARS-CoV-2 infection: requiring hospitalization in 04/2021.   # PUD  # HTN- bisoprolol, losartan  # Venous insufficiency- compression stockings  # hypothyroid- on synthroid   low TSH on 6/5/23: 1.42  # GASTON- Home sleep study 11/7/19: mild GASTON, lowest oxygen 86%  # obesity, 205 lbs today  # Raynauds symptoms- cold hands and white on occasion-  # palpitations- sound very much like PVCs     TODAY'S PLAN:  Recommend retrial of statin with CoQ10 for 's- unwilling to retry  Stated Zetia gave her headaches- stopped  Wt loss given mild GASTON  Continue bisoprol and losartan 25 mg for HFrEF  periodic Raynauds- would not start additional meds  Compression stockings for venous insufficiency- does not wear    HPI:   71 yo female who I saw about a year ago for abnormal EKG. She has hx of PUD, IBS, HTN, fibromyalgia, OA post left TKR and venous insufficiency with treatment for varicose veins. Had stress test in past which was normal. Father had CAD with cardiac arrest. Older sister  of cardiac arrest. Echo showed EF: 70%.   Hospitalization for chest pain Aug 2018- stress test, same small defect apically on last nuclear stress, EF[de-identified] 53%  Pain thought to be due to hiatal hernia  Unable to tolerated statins, tried red yeast rice and did not tolerate as well. Was started on coreg by AP and reported hair loss so coreg stopped and bisoprolol 5 mg added     Interval History:   Stopped Zetia on her own due to headaches  Feels like a little off balance    Past Medical History:   Diagnosis Date   • Arthritis     OA, bilateral resolved 2016   • Balance disorder     resolved 2017   • Chronic constipation     resolved 2017   • Chronic headaches    • Colon polyps    • Depression    • Dermatitis     resolved 2017   • Esophageal reflux     resolved 2017   • Fibromyalgia    • Frequent headaches     resolved 2017   • GERD (gastroesophageal reflux disease)     symptomatic-for EGD today 10/31/2016   • Heartburn    • Herniated lumbar intervertebral disc    • Hyperlipidemia    • Hypertension     last assessed 2017   • Hypothyroid    • IBS (irritable bowel syndrome)    • Mobility impaired     uses cane -needs R  TKR   • Osteoarthritis, knee     bilateral resolved 2017   • SAVANNAH (stress urinary incontinence, female)    • Trouble swallowing    • Varicose veins of bilateral lower extremities with pain     wears support hose   • Varicose veins of lower extremity     both with pain resolved 2017   • Wears dentures     full upper   partial lower   • Wears glasses        Review of Systems   Constitutional: Negative for activity change, appetite change, fatigue and unexpected weight change.    HENT: Negative for congestion and nosebleeds. Eyes: Negative. Respiratory: Negative for cough, chest tightness and shortness of breath (wilde- improved). Cardiovascular: Positive for palpitations (sound like PVC). Negative for chest pain and leg swelling. Gastrointestinal: Negative for abdominal distention. Endocrine: Negative. Genitourinary: Negative. Musculoskeletal: Negative. Skin: Negative. Neurological: Negative for dizziness, syncope and weakness. Hematological: Negative. Psychiatric/Behavioral: Negative. Allergies   Allergen Reactions   • Aciphex [Rabeprazole] Itching, Chest Pain, Shortness Of Breath and GI Intolerance     headache   • Benzonatate Other (See Comments), Chest Pain and Shortness Of Breath     Chest pain   • Biaxin [Clarithromycin] Other (See Comments), Chest Pain and Nausea Only     Reaction Date: [id number];   Chest pressure    weakness   • Doxycycline GI Intolerance, Chest Pain, Itching and Shortness Of Breath   • Avelox [Moxifloxacin] GI Intolerance, Chest Pain and Nausea Only     Reaction Date: 08Sep2011;    • Co Q 10 [Coenzyme Q10]    • Codeine GI Intolerance, Chest Pain and Nausea Only     Reaction Date: 08Sep2011;   weakness chest pain   • Metoclopramide GI Intolerance   • Nizatidine Other (See Comments), Chest Pain, Nausea Only, Vomiting and GI Intolerance     Nausea   Chest pain   • Oxycodone GI Intolerance   • Oxycodone-Acetaminophen Chest Pain and Nausea Only     Reaction Date: 08Sep2011;    • Plecanatide    • Pravastatin Other (See Comments), Headache and Nausea Only     headaches   • Ranitidine Other (See Comments), Chest Pain and GI Intolerance     chest pain  Arm tingling   • Ubidecarenone GI Intolerance   • Caffeine - Food Allergy Other (See Comments) and Headache     migraines   • Chlorhexidine Rash   • Latex Itching and Rash     Minor itching to the powder to gloves. Minor itching to the powder to gloves.     • Levaquin [Levofloxacin] Rash, Chest Pain, Nausea Only, Other (See Comments) and GI Intolerance     Reaction Date: [id number];    • Other Rash and Edema     Reaction Date: [id number];   Syncope  Med is amortrpatcl? An antibiotic  Tape   • Statins Rash   • Sulfa Antibiotics Rash     .     Current Outpatient Medications:   •  bisoprolol (ZEBETA) 5 mg tablet, Take 1 tablet (5 mg total) by mouth daily, Disp: 90 tablet, Rfl: 3  •  famotidine (PEPCID) 20 mg tablet, Take 1 tablet (20 mg total) by mouth every other day (Patient taking differently: Take 20 mg by mouth as needed), Disp: 15 tablet, Rfl: 1  •  levothyroxine 88 mcg tablet, Take 88 mcg by mouth daily, Disp: , Rfl:   •  losartan (COZAAR) 25 mg tablet, Take 1 tablet (25 mg total) by mouth daily, Disp: 90 tablet, Rfl: 3  •  acetaminophen (TYLENOL) 650 mg CR tablet, Take 650 mg by mouth   (Patient not taking: Reported on 9/12/2023), Disp: , Rfl:   •  Cholecalciferol (VITAMIN D3) 1000 UNIT/SPRAY LIQD, Vitamin D3 (Patient not taking: Reported on 9/12/2023), Disp: , Rfl:   •  diclofenac sodium (VOLTAREN) 1 %, Apply 4 g topically 4 (four) times a day as needed, Disp: , Rfl:   •  ezetimibe (ZETIA) 10 mg tablet, Take 1 tablet (10 mg total) by mouth daily (Patient not taking: Reported on 9/12/2023), Disp: 30 tablet, Rfl: 4  •  Multiple Vitamins-Minerals (ALIVE ONCE DAILY WOMENS 50+ PO), Alive Once Daily Women 50 Plus (Patient not taking: Reported on 9/12/2023), Disp: , Rfl:   •  nystatin (MYCOSTATIN) cream, Apply twice a day to rash areas (Patient not taking: No sig reported), Disp: , Rfl:   •  sodium chloride (OCEAN) 0.65 % nasal spray, 1 spray into each nostril as needed for congestion (Patient not taking: Reported on 3/7/2022), Disp: , Rfl:   •  triamcinolone (KENALOG) 0.1 % ointment, Apply topically 2 (two) times a day as needed, Disp: , Rfl:   •  Vitamin D, Cholecalciferol, 50 MCG (2000 UT) CAPS, Take 1 tablet by mouth in the morning (Patient not taking: Reported on 9/12/2023), Disp: , Rfl:     Social History     Socioeconomic History   • Marital status:      Spouse name: Not on file   • Number of children: 5   • Years of education: Not on file   • Highest education level: Not on file   Occupational History   • Occupation: retired   Tobacco Use   • Smoking status: Never   • Smokeless tobacco: Never   Substance and Sexual Activity   • Alcohol use: No   • Drug use: No   • Sexual activity: Not on file   Other Topics Concern   • Not on file   Social History Narrative    Denied history of caffeine use    5 children 4 biological 1 adopted    Anabaptism affiliation Shinto    Uses safety equipment seatbelts     Social Determinants of Health     Financial Resource Strain: Not on file   Food Insecurity: Not on file   Transportation Needs: Not on file   Physical Activity: Not on file   Stress: Not on file   Social Connections: Not on file   Intimate Partner Violence: Not on file   Housing Stability: Not on file       Family History   Problem Relation Age of Onset   • Breast cancer Mother    • Stomach cancer Family    • Colon cancer Family    • Heart failure Family    • Diabetes Family    • Hypertension Family    • Thyroid disease unspecified Family         disorder       Physical Exam:    Vitals: Blood pressure 122/74, pulse 72, height 5' 5" (1.651 m), weight 93 kg (205 lb), SpO2 97 %. , Body mass index is 34.11 kg/m².,   Wt Readings from Last 3 Encounters:   09/12/23 93 kg (205 lb)   02/07/23 94.7 kg (208 lb 11.2 oz)   12/01/22 93 kg (205 lb)       Physical Exam  Constitutional:       Appearance: She is well-developed. HENT:      Head: Normocephalic and atraumatic. Eyes:      Pupils: Pupils are equal, round, and reactive to light. Neck:      Vascular: No JVD. Cardiovascular:      Rate and Rhythm: Normal rate and regular rhythm. Heart sounds: No murmur heard. Pulmonary:      Effort: Pulmonary effort is normal. No respiratory distress. Breath sounds: Normal breath sounds. Abdominal:      General: There is no distension. Palpations: Abdomen is soft. Tenderness: There is no abdominal tenderness. Musculoskeletal:         General: Normal range of motion. Cervical back: Normal range of motion. Skin:     General: Skin is warm and dry. Findings: No rash. Neurological:      Mental Status: She is alert and oriented to person, place, and time. Labs & Results:    Lab Results   Component Value Date    SODIUM 134 (L) 10/01/2018    K 4.1 10/01/2018     10/01/2018    CO2 27 10/01/2018    BUN 14 10/01/2018    CREATININE 0.85 10/01/2018    GLUC 95 09/01/2018    CALCIUM 9.1 10/01/2018     Lab Results   Component Value Date    WBC 5.33 09/01/2018    HGB 13.6 09/01/2018    HCT 41.5 09/01/2018     (H) 09/01/2018     09/01/2018     No results found for: "BNP"   Lab Results   Component Value Date    CHOLESTEROL 228 (H) 10/01/2018    CHOLESTEROL 209 (H) 10/24/2017    CHOLESTEROL 248 (H) 12/03/2016     Lab Results   Component Value Date    HDL 62 (H) 10/01/2018    HDL 74 (H) 10/24/2017    HDL 84 (H) 12/03/2016     Lab Results   Component Value Date    TRIG 87 10/01/2018    TRIG 86 10/24/2017    TRIG 60 12/03/2016     No results found for: "[address]"    EKG personally reviewed by Jayla Butler. Counseling / Coordination of Care  Time spent today 25 minutes. Greater than 50% of total time was spent with the patient and / or family counseling and / or coordination of care. We discussed diagnoses, most recent studies, tests and any changes in treatment plan  Thank you for the opportunity to participate in the care of this patient.     [facility]  MEDICAL DIRECTOR OF [address] [place] [address]

## 2023-09-12 ENCOUNTER — OFFICE VISIT (OUTPATIENT)
Dept: CARDIOLOGY CLINIC | Facility: CLINIC | Age: 73
End: 2023-09-12
Payer: COMMERCIAL

## 2023-09-12 VITALS
HEIGHT: 65 IN | HEART RATE: 72 BPM | DIASTOLIC BLOOD PRESSURE: 74 MMHG | WEIGHT: 205 LBS | SYSTOLIC BLOOD PRESSURE: 122 MMHG | BODY MASS INDEX: 34.16 KG/M2 | OXYGEN SATURATION: 97 %

## 2023-09-12 DIAGNOSIS — G47.33 OBSTRUCTIVE SLEEP APNEA: ICD-10-CM

## 2023-09-12 DIAGNOSIS — I10 HTN (HYPERTENSION), BENIGN: ICD-10-CM

## 2023-09-12 DIAGNOSIS — E78.00 PURE HYPERCHOLESTEROLEMIA: Primary | ICD-10-CM

## 2023-09-12 DIAGNOSIS — I50.22 CHRONIC HFREF (HEART FAILURE WITH REDUCED EJECTION FRACTION) (HCC): ICD-10-CM

## 2023-09-12 DIAGNOSIS — I10 PRIMARY HYPERTENSION: ICD-10-CM

## 2023-09-12 DIAGNOSIS — I83.93 VARICOSE VEINS OF BOTH LOWER EXTREMITIES, UNSPECIFIED WHETHER COMPLICATED: ICD-10-CM

## 2023-09-12 DIAGNOSIS — I50.32 CHRONIC DIASTOLIC CHF (CONGESTIVE HEART FAILURE), NYHA CLASS 2 (HCC): ICD-10-CM

## 2023-09-12 PROCEDURE — 99214 OFFICE O/P EST MOD 30 MIN: CPT | Performed by: INTERNAL MEDICINE

## 2023-09-12 RX ORDER — LOSARTAN POTASSIUM 25 MG/1
25 TABLET ORAL DAILY
Qty: 90 TABLET | Refills: 3 | Status: SHIPPED | OUTPATIENT
Start: 2023-09-12

## 2023-10-24 DIAGNOSIS — I50.22 CHRONIC HFREF (HEART FAILURE WITH REDUCED EJECTION FRACTION) (HCC): ICD-10-CM

## 2023-10-24 RX ORDER — LOSARTAN POTASSIUM 25 MG/1
25 TABLET ORAL DAILY
Qty: 90 TABLET | Refills: 3 | Status: SHIPPED | OUTPATIENT
Start: 2023-10-24

## 2024-03-11 NOTE — PROGRESS NOTES
"Cardiology Outpatient Progress Note - June E Nydia 73 y.o. female MRN: [id number]    @ Encounter: [id number]      Patient Active Problem List    Diagnosis Date Noted    Obstructive sleep apnea     Fatigue due to sleep pattern disturbance     Hypothyroidism     Hypertension     Hyperlipidemia     GERD (gastroesophageal reflux disease)     Osteopenia 10/16/2017    PUD (peptic ulcer disease) 10/31/2016    Osteoarthritis of left knee 02/22/2016    Varicose veins of legs 03/30/2015    Displacement of lumbar intervertebral disc without myelopathy 06/04/2013    Displacement of thoracic intervertebral disc without myelopathy 06/04/2013    Other chronic pulmonary embolism without acute cor pulmonale (HCC) 09/07/2022       Assessment:  # Chronic HFimpEF, Stage C, NYHA II  Etiology: presumed NICM, possible viral in setting of COVID infection    Weight: 205 lbs  NT proBNP: 4/20/21: 201    Studies- personally reviewed by me  Echo 12/1/22:  LVEF: 55%  RV: normal  PASP: 34 mmHg    Echo 12/21/21  LVEF: 35-40%  LVEDd:  RV: normal  PASP: 28 mmHg    cMR 8/16/21: LVEF: 48%, Thin strip of intramyocardial fibrosis within the basal to mid interventricular septum.  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    Nuclear stress 05/26/2021 (at [facility], per report): \"uniform labeling throughout the left ventricular myocardium with no fixed or reversible perfusion defects demonstrated...normal left ventricular wall motion and wall thickening...calculated left ventricular ejection fraction is 63%.\"  TTE 05/24/2021 (at [facility], per report): LVEF 30-40%. LVIDd 5.02 cm. Normal RV size and RVSF.   TTE 10/15/2018: LVEF 50-55%. LVIDd 4.9 cm. Grade 1 DD. Normal RV size and RVSF. Mild MR and TR. PASP 28 mmHg.     Neurohormonal Blockade:  --Beta Blocker: stop bisoprolol (did not tolerate metoprolol and reported acute hair loss with Coreg).  --ARNi / ACEi / ARB: losartan 25 mg daily ([facility] cost-prohibitive " [facility]). --Aldosterone Antagonist: No.   --SGLT2 Inhibitor: No.   --Diuretic: No.       Sudden Cardiac Death Risk Reduction:  EF now over 35%     Electrical Resynchronization:  --Candidacy for BiV device: LBBB with QRS >150 ms.     # LBBB on EKG  Echo 10/15/18: EF: 55%  Nuclear stress test 9/5/18: no ischemia, Breast attenuation defect apically- seen on last stress test and correlated with normal function on echo  Chest pain admit 8/31/18: neg trop    # Dyslipidemia: not on statin  11/9/22: Tri 175, HDL 59,   6/3/21: , HDL 53  Said she had upset stomach on red yeast rice, felt she had rash and sick on statin, unwilling to retry  Zetia - states gave her headaches    # Pulmonary Embolism (may be related to COVID)  Diagnosed during 5/2021 admit  AC: Eliquis- can now stop    CTA Chest 11/15/21: Substantial improvement of previous right lower lobe pulmonary   emboli, with small residual embolus suggested proximally in a right lower lobe   posterior branch. Improvement of previous pulmonary infiltrates.    CTA chest/PE study 05/21/2021 (at [facility], per report): \"subsegmental pulmonary emboli and right lower lobe. Trace right pleural effusion.\"    History of SARS-CoV-2 infection: requiring hospitalization in 04/2021.  # PUD  # HTN- bisoprolol, losartan  # Venous insufficiency- compression stockings  # hypothyroid- on synthroid   low TSH on 6/5/23: 1.42  # GASTON- Home sleep study 11/7/19: mild GASTON, lowest oxygen 86%  # obesity, 205 lbs   # Raynauds symptoms- cold hands and white on occasion-  # palpitations- sound very much like PVCs     TODAY'S PLAN:  Due for labs- will check labs  Upset stomach from bisoprolol- HR low, BP low- will stop  Recommend retrial of statin with CoQ10 for 's- unwilling to retry  Stated Zetia gave her headaches- stopped  Wt loss given mild GASTON  Continue bisoprol and losartan 25 mg for HFrEF  periodic Raynauds- would not start additional meds  Compression stockings for venous " insufficiency- does not wear    HPI:   74 yo female who I saw about a year ago for abnormal EKG. She has hx of PUD, IBS, HTN, fibromyalgia, OA post left TKR and venous insufficiency with treatment for varicose veins. Had stress test in past which was normal. Father had CAD with cardiac arrest. Older sister  of cardiac arrest. Echo showed EF: 70%.   Hospitalization for chest pain Aug 2018- stress test, same small defect apically on last nuclear stress, EF:: 53%  Pain thought to be due to hiatal hernia  Unable to tolerated statins, tried red yeast rice and did not tolerate as well. Was started on coreg by AP and reported hair loss so coreg stopped and bisoprolol 5 mg added. Also stopped Zetia- she's states due to headaches.        Interval History:   Occasional non cardiac chest pains, sharp pain  No edema  Getting colonoscopy    Past Medical History:   Diagnosis Date    Arthritis     OA, bilateral resolved 2016    Balance disorder     resolved 2017    Chronic constipation     resolved 2017    Chronic headaches     Colon polyps     Depression     Dermatitis     resolved 2017    Esophageal reflux     resolved 2017    Fibromyalgia     Frequent headaches     resolved 2017    GERD (gastroesophageal reflux disease)     symptomatic-for EGD today 10/31/2016    Heartburn     Herniated lumbar intervertebral disc     Hyperlipidemia     Hypertension     last assessed 2017    Hypothyroid     IBS (irritable bowel syndrome)     Mobility impaired     uses cane -needs R  TKR    Osteoarthritis, knee     bilateral resolved 2017    SAVANNAH (stress urinary incontinence, female)     Trouble swallowing     Varicose veins of bilateral lower extremities with pain     wears support hose    Varicose veins of lower extremity     both with pain resolved 2017    Wears dentures     full upper   partial lower    Wears glasses        Review of Systems   Constitutional:  Negative for activity  change, appetite change, fatigue and unexpected weight change.   HENT:  Negative for congestion and nosebleeds.    Eyes: Negative.    Respiratory:  Negative for cough, chest tightness and shortness of breath (wilde- improved).    Cardiovascular:  Positive for palpitations (sound like PVC). Negative for chest pain and leg swelling.   Gastrointestinal:  Negative for abdominal distention.   Endocrine: Negative.    Genitourinary: Negative.    Musculoskeletal: Negative.    Skin: Negative.    Neurological:  Negative for dizziness, syncope and weakness.   Hematological: Negative.    Psychiatric/Behavioral: Negative.         Allergies   Allergen Reactions    Aciphex [Rabeprazole] Itching, Chest Pain, Shortness Of Breath and GI Intolerance     headache    Benzonatate Other (See Comments), Chest Pain and Shortness Of Breath     Chest pain    Biaxin [Clarithromycin] Other (See Comments), Chest Pain and Nausea Only     Reaction Date: 08Sep2011;   Chest pressure    weakness    Doxycycline GI Intolerance, Chest Pain, Itching and Shortness Of Breath    Avelox [Moxifloxacin] GI Intolerance, Chest Pain and Nausea Only     Reaction Date: 08Sep2011;     Co Q 10 [Coenzyme Q10]     Codeine GI Intolerance, Chest Pain and Nausea Only     Reaction Date: 08Sep2011;   weakness chest pain    Metoclopramide GI Intolerance    Nizatidine Other (See Comments), Chest Pain, Nausea Only, Vomiting and GI Intolerance     Nausea   Chest pain    Oxycodone GI Intolerance    Oxycodone-Acetaminophen Chest Pain and Nausea Only     Reaction Date: 08Sep2011;     Plecanatide     Pravastatin Other (See Comments), Headache and Nausea Only     headaches    Ranitidine Other (See Comments), Chest Pain and GI Intolerance     chest pain  Arm tingling    Ubidecarenone GI Intolerance    Caffeine - Food Allergy Other (See Comments) and Headache     migraines    Chlorhexidine Rash    Latex Itching and Rash     Minor itching to the powder to gloves.   Minor itching to the  powder to gloves.     Levaquin [Levofloxacin] Rash, Chest Pain, Nausea Only, Other (See Comments) and GI Intolerance     Reaction Date: 08Sep2011;     Other Rash and Edema     Reaction Date: 08Sep2011;   Syncope  Med is amortrpatcl? An antibiotic  Tape    Statins Rash    Sulfa Antibiotics Rash     .    Current Outpatient Medications:     bisoprolol (ZEBETA) 5 mg tablet, Take 1 tablet (5 mg total) by mouth daily, Disp: 90 tablet, Rfl: 3    levothyroxine 88 mcg tablet, Take 88 mcg by mouth daily, Disp: , Rfl:     losartan (COZAAR) 25 mg tablet, Take 1 tablet (25 mg total) by mouth daily, Disp: 90 tablet, Rfl: 3    acetaminophen (TYLENOL) 650 mg CR tablet, Take 650 mg by mouth   (Patient not taking: Reported on 9/12/2023), Disp: , Rfl:     Cholecalciferol (VITAMIN D3) 1000 UNIT/SPRAY LIQD, Vitamin D3 (Patient not taking: Reported on 9/12/2023), Disp: , Rfl:     diclofenac sodium (VOLTAREN) 1 %, Apply 4 g topically 4 (four) times a day as needed, Disp: , Rfl:     ezetimibe (ZETIA) 10 mg tablet, Take 1 tablet (10 mg total) by mouth daily (Patient not taking: Reported on 9/12/2023), Disp: 30 tablet, Rfl: 4    famotidine (PEPCID) 20 mg tablet, Take 1 tablet (20 mg total) by mouth every other day (Patient not taking: Reported on 3/12/2024), Disp: 15 tablet, Rfl: 1    Multiple Vitamins-Minerals (ALIVE ONCE DAILY WOMENS 50+ PO), Alive Once Daily Women 50 Plus (Patient not taking: Reported on 9/12/2023), Disp: , Rfl:     nystatin (MYCOSTATIN) cream, Apply twice a day to rash areas (Patient not taking: No sig reported), Disp: , Rfl:     sodium chloride (OCEAN) 0.65 % nasal spray, 1 spray into each nostril as needed for congestion (Patient not taking: Reported on 3/7/2022), Disp: , Rfl:     triamcinolone (KENALOG) 0.1 % ointment, Apply topically 2 (two) times a day as needed, Disp: , Rfl:     Vitamin D, Cholecalciferol, 50 MCG (2000 UT) CAPS, Take 1 tablet by mouth in the morning (Patient not taking: Reported on 9/12/2023),  "Disp: , Rfl:     Social History     Socioeconomic History    Marital status:      Spouse name: Not on file    Number of children: 5    Years of education: Not on file    Highest education level: Not on file   Occupational History    Occupation: retired   Tobacco Use    Smoking status: Never    Smokeless tobacco: Never   Substance and Sexual Activity    Alcohol use: No    Drug use: No    Sexual activity: Not on file   Other Topics Concern    Not on file   Social History Narrative    Denied history of caffeine use    5 children 4 biological 1 adopted    Restorationism affiliation Jainism    Uses safety equipment seatbelts     Social Determinants of Health     Financial Resource Strain: Not on file   Food Insecurity: Not on file   Transportation Needs: Not on file   Physical Activity: Not on file   Stress: Not on file   Social Connections: Not on file   Intimate Partner Violence: Not on file   Housing Stability: Not on file       Family History   Problem Relation Age of Onset    Breast cancer Mother     Stomach cancer Family     Colon cancer Family     Heart failure Family     Diabetes Family     Hypertension Family     Thyroid disease unspecified Family         disorder       Physical Exam:    Vitals: Blood pressure 102/68, pulse (!) 52, height 5' 5\" (1.651 m), weight 89.8 kg (198 lb), SpO2 98%., Body mass index is 32.95 kg/m².,   Wt Readings from Last 3 Encounters:   03/12/24 89.8 kg (198 lb)   09/12/23 93 kg (205 lb)   02/07/23 94.7 kg (208 lb 11.2 oz)       Physical Exam  Constitutional:       Appearance: She is well-developed.   HENT:      Head: Normocephalic and atraumatic.   Eyes:      Pupils: Pupils are equal, round, and reactive to light.   Neck:      Vascular: No JVD.   Cardiovascular:      Rate and Rhythm: Normal rate and regular rhythm.      Heart sounds: No murmur heard.  Pulmonary:      Effort: Pulmonary effort is normal. No respiratory distress.      Breath sounds: Normal breath sounds. " "  Abdominal:      General: There is no distension.      Palpations: Abdomen is soft.      Tenderness: There is no abdominal tenderness.   Musculoskeletal:         General: Normal range of motion.      Cervical back: Normal range of motion.   Skin:     General: Skin is warm and dry.      Findings: No rash.   Neurological:      Mental Status: She is alert and oriented to person, place, and time.       Labs & Results:    Lab Results   Component Value Date    SODIUM 141 06/05/2023    K 4.0 06/05/2023     06/05/2023    CO2 27 06/05/2023    BUN 20 06/05/2023    CREATININE 1.01 06/05/2023    GLUC 89 06/05/2023    CALCIUM 9.7 06/05/2023     Lab Results   Component Value Date    WBC 5.33 09/01/2018    HGB 13.6 09/01/2018    HCT 41.5 09/01/2018     (H) 09/01/2018     09/01/2018     No results found for: \"BNP\"   Lab Results   Component Value Date    CHOLESTEROL 228 (H) 10/01/2018    CHOLESTEROL 209 (H) 10/24/2017    CHOLESTEROL 248 (H) 12/03/2016     Lab Results   Component Value Date    HDL 62 (H) 10/01/2018    HDL 74 (H) 10/24/2017    HDL 84 (H) 12/03/2016     Lab Results   Component Value Date    TRIG 87 10/01/2018    TRIG 86 10/24/2017    TRIG 60 12/03/2016     No results found for: \"NONHDLC\"    EKG personally reviewed by Car Camacho.     Counseling / Coordination of Care  Time spent today 25 minutes.  Greater than 50% of total time was spent with the patient and / or family counseling and / or coordination of care.  We discussed diagnoses, most recent studies, tests and any changes in treatment plan  Thank you for the opportunity to participate in the care of this patient.    CAR CAMACHO D.O.  DIRECTOR OF HEART FAILURE/ PULMONARY HYPERTENSION  MEDICAL DIRECTOR OF LVAD PROGRAM  [facility]  "

## 2024-03-12 ENCOUNTER — OFFICE VISIT (OUTPATIENT)
Dept: CARDIOLOGY CLINIC | Facility: CLINIC | Age: 74
End: 2024-03-12
Payer: COMMERCIAL

## 2024-03-12 VITALS
DIASTOLIC BLOOD PRESSURE: 68 MMHG | WEIGHT: 198 LBS | HEART RATE: 52 BPM | BODY MASS INDEX: 32.99 KG/M2 | HEIGHT: 65 IN | OXYGEN SATURATION: 98 % | SYSTOLIC BLOOD PRESSURE: 102 MMHG

## 2024-03-12 DIAGNOSIS — G47.33 OBSTRUCTIVE SLEEP APNEA: ICD-10-CM

## 2024-03-12 DIAGNOSIS — I50.32 CHRONIC HEART FAILURE WITH PRESERVED EJECTION FRACTION (HFPEF) (HCC): ICD-10-CM

## 2024-03-12 DIAGNOSIS — I27.82 OTHER CHRONIC PULMONARY EMBOLISM WITHOUT ACUTE COR PULMONALE (HCC): ICD-10-CM

## 2024-03-12 DIAGNOSIS — E78.00 PURE HYPERCHOLESTEROLEMIA: Primary | ICD-10-CM

## 2024-03-12 DIAGNOSIS — I50.22 CHRONIC HFREF (HEART FAILURE WITH REDUCED EJECTION FRACTION) (HCC): ICD-10-CM

## 2024-03-12 DIAGNOSIS — I10 PRIMARY HYPERTENSION: ICD-10-CM

## 2024-03-12 PROCEDURE — 99214 OFFICE O/P EST MOD 30 MIN: CPT | Performed by: INTERNAL MEDICINE

## 2024-03-15 LAB
ALBUMIN SERPL-MCNC: 3.9 G/DL (ref 3.5–5.7)
ALP SERPL-CCNC: 68 U/L (ref 35–120)
ALT SERPL-CCNC: 11 U/L
ANION GAP SERPL CALCULATED.3IONS-SCNC: 9 MMOL/L (ref 3–11)
AST SERPL-CCNC: 16 U/L
BASOPHILS # BLD AUTO: 0 THOU/CMM (ref 0–0.1)
BASOPHILS NFR BLD AUTO: 1 %
BILIRUB SERPL-MCNC: 0.6 MG/DL (ref 0.2–1)
BUN SERPL-MCNC: 18 MG/DL (ref 7–25)
CALCIUM SERPL-MCNC: 9.2 MG/DL (ref 8.5–10.1)
CHLORIDE SERPL-SCNC: 102 MMOL/L (ref 100–109)
CHOLEST SERPL-MCNC: 259 MG/DL
CHOLEST/HDLC SERPL: 5.1 {RATIO}
CO2 SERPL-SCNC: 27 MMOL/L (ref 21–31)
CREAT SERPL-MCNC: 0.89 MG/DL (ref 0.4–1.1)
CYTOLOGY CMNT CVX/VAG CYTO-IMP: NORMAL
DIFFERENTIAL METHOD BLD: NORMAL
EOSINOPHIL # BLD AUTO: 0.3 THOU/CMM (ref 0–0.5)
EOSINOPHIL NFR BLD AUTO: 6 %
ERYTHROCYTE [DISTWIDTH] IN BLOOD BY AUTOMATED COUNT: 13.8 % (ref 12–16)
GFR/BSA.PRED SERPLBLD CYS-BASED-ARV: 68 ML/MIN/{1.73_M2}
GLUCOSE SERPL-MCNC: 94 MG/DL (ref 65–99)
HCT VFR BLD AUTO: 38.2 % (ref 35–43)
HDLC SERPL-MCNC: 51 MG/DL (ref 23–92)
HGB BLD-MCNC: 12.9 G/DL (ref 11.5–14.5)
LDLC SERPL CALC-MCNC: 181 MG/DL
LYMPHOCYTES # BLD AUTO: 2.2 THOU/CMM (ref 1–3)
LYMPHOCYTES NFR BLD AUTO: 37 %
MCH RBC QN AUTO: 32.9 PG (ref 26–34)
MCHC RBC AUTO-ENTMCNC: 33.7 G/DL (ref 32–37)
MCV RBC AUTO: 98 FL (ref 80–100)
MONOCYTES # BLD AUTO: 0.6 THOU/CMM (ref 0.3–1)
MONOCYTES NFR BLD AUTO: 10 %
NEUTROPHILS # BLD AUTO: 2.9 THOU/CMM (ref 1.8–7.8)
NEUTROPHILS NFR BLD AUTO: 46 %
NONHDLC SERPL-MCNC: 208 MG/DL
PLATELET # BLD AUTO: 217 THOU/CMM (ref 140–350)
PMV BLD REES-ECKER: 9.5 FL (ref 7.5–11.3)
POTASSIUM SERPL-SCNC: 4 MMOL/L (ref 3.5–5.2)
PROT SERPL-MCNC: 6.4 G/DL (ref 6.3–8.3)
RBC # BLD AUTO: 3.9 MILL/CMM (ref 3.7–4.7)
SODIUM SERPL-SCNC: 138 MMOL/L (ref 135–145)
TRIGL SERPL-MCNC: 135 MG/DL
WBC # BLD AUTO: 6.1 THOU/CMM (ref 4–10)

## 2024-10-14 DIAGNOSIS — I50.22 CHRONIC HFREF (HEART FAILURE WITH REDUCED EJECTION FRACTION) (HCC): ICD-10-CM

## 2024-10-14 RX ORDER — LOSARTAN POTASSIUM 25 MG/1
25 TABLET ORAL DAILY
Qty: 90 TABLET | Refills: 3 | Status: SHIPPED | OUTPATIENT
Start: 2024-10-14

## 2024-10-14 NOTE — TELEPHONE ENCOUNTER
Patient is asking if she needs to continue taking the Losartan. If patient is to continue taking, please send the refill.

## 2024-12-11 NOTE — PROGRESS NOTES
"Cardiology Outpatient Progress Note - Leida CONSUELO Stafford 74 y.o. female MRN: [id number]    @ Encounter: [id number]      Patient Active Problem List    Diagnosis Date Noted    Obstructive sleep apnea     Fatigue due to sleep pattern disturbance     Hypothyroidism     Hypertension     Hyperlipidemia     GERD (gastroesophageal reflux disease)     Osteopenia 10/16/2017    PUD (peptic ulcer disease) 10/31/2016    Osteoarthritis of left knee 02/22/2016    Varicose veins of legs 03/30/2015    Displacement of lumbar intervertebral disc without myelopathy 06/04/2013    Displacement of thoracic intervertebral disc without myelopathy 06/04/2013    Other chronic pulmonary embolism without acute cor pulmonale (HCC) 09/07/2022       Assessment:  # Chronic HFimpEF, Stage C, NYHA II  Etiology: presumed NICM, possible viral in setting of COVID infection    Weight: 205 lbs--> 190 lbs  NT proBNP: 4/20/21: 201    Studies- personally reviewed by me  Echo 12/1/22:  LVEF: 55%  RV: normal  PASP: 34 mmHg    Echo 12/21/21  LVEF: 35-40%  LVEDd:  RV: normal  PASP: 28 mmHg    cMR 8/16/21: LVEF: 48%, Thin strip of intramyocardial fibrosis within the basal to mid interventricular septum.  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    Nuclear stress 05/26/2021 (at [facility], per report): \"uniform labeling throughout the left ventricular myocardium with no fixed or reversible perfusion defects demonstrated...normal left ventricular wall motion and wall thickening...calculated left ventricular ejection fraction is 63%.\"  [facility]): LVEF 30-40%. LVIDd 5.02 cm. Normal RV size and RVSF.   TTE 10/15/2018: LVEF 50-55%. LVIDd 4.9 cm. Grade 1 DD. Normal RV size and RVSF. Mild MR and TR. PASP 28 mmHg.     Neurohormonal Blockade:  --Beta Blocker: stop bisoprolol (did not tolerate metoprolol and reported acute hair loss with Coreg).  --ARNi / ACEi / ARB: losartan 25 mg daily (Entresto " "cost-prohibitive per [facility] notes). - coming off   --Aldosterone Antagonist: No.   --SGLT2 Inhibitor: No.   --Diuretic: No.       Sudden Cardiac Death Risk Reduction:  EF now over 35%     Electrical Resynchronization:  --Candidacy for BiV device: LBBB with QRS >150 ms.     # LBBB on EKG  Echo 10/15/18: EF: 55%  Nuclear stress test 9/5/18: no ischemia, Breast attenuation defect apically- seen on last stress test and correlated with normal function on echo  Chest pain admit 8/31/18: neg trop    # Dyslipidemia: not on statin  11/9/22: Tri 175, HDL 59,   6/3/21: , HDL 53  Said she had upset stomach on red yeast rice, felt she had rash and sick on statin, unwilling to retry  Zetia - states gave her headaches    # Pulmonary Embolism (may be related to COVID)  Diagnosed during 5/2021 admit  AC: Eliquis- can now stop    CTA Chest 11/15/21: Substantial improvement of previous right lower lobe pulmonary   emboli, with small residual embolus suggested proximally in a right lower lobe   posterior branch. Improvement of previous pulmonary infiltrates.    CTA chest/PE study 05/21/2021 (at [facility], per report): \"subsegmental pulmonary emboli and right lower lobe. Trace right pleural effusion.\"    History of SARS-CoV-2 infection: requiring hospitalization in 04/2021.  # PUD  # HTN- losartan  # Venous insufficiency- compression stockings  # hypothyroid- on synthroid   low TSH on 6/5/23: 1.42  # GASTON- Home sleep study 11/7/19: mild GASTON, lowest oxygen 86%  # obesity, 205 lbs   # Raynauds symptoms- cold hands and white on occasion-  # palpitations- sound very much like PVCs     TODAY'S PLAN:  Stop losartan, getting a lot of lightheadedness and orthostasis  Occasional left sided chest pain, stressed, if escalates can do stress  Recommend retrial of statin with CoQ10 for 's- unwilling to retry  Stated Zetia gave her headaches- stopped  Wt loss given mild GASTON    periodic Raynauds- would not start additional " meds  Compression stockings for venous insufficiency- does not wear    HPI:   75 yo female who I saw about a year ago for abnormal EKG. She has hx of PUD, IBS, HTN, fibromyalgia, OA post left TKR and venous insufficiency with treatment for varicose veins. Had stress test in past which was normal. Father had CAD with cardiac arrest. Older sister  of cardiac arrest. Echo showed EF: 70%.   Hospitalization for chest pain Aug 2018- stress test, same small defect apically on last nuclear stress, EF:: 53%  Pain thought to be due to hiatal hernia  Unable to tolerated statins, tried red yeast rice and did not tolerate as well. Was started on coreg by AP and reported hair loss so coreg stopped and bisoprolol 5 mg added. Also stopped Zetia- she's states due to headaches.        Interval History:   Weight down 15 lbs- eating less as stressed out  Son testicular cancer- getting chemo  Gets lightheaded a lot on standing  Past Medical History:   Diagnosis Date    Arthritis     OA, bilateral resolved 2016    Balance disorder     resolved 2017    Chronic constipation     resolved 2017    Chronic headaches     Colon polyps     Depression     Dermatitis     resolved 2017    Esophageal reflux     resolved 2017    Fibromyalgia     Frequent headaches     resolved 2017    GERD (gastroesophageal reflux disease)     symptomatic-for EGD today 10/31/2016    Heartburn     Herniated lumbar intervertebral disc     Hyperlipidemia     Hypertension     last assessed 2017    Hypothyroid     IBS (irritable bowel syndrome)     Mobility impaired     uses cane -needs R  TKR    Osteoarthritis, knee     bilateral resolved 2017    SAVANNAH (stress urinary incontinence, female)     Trouble swallowing     Varicose veins of bilateral lower extremities with pain     wears support hose    Varicose veins of lower extremity     both with pain resolved 2017    Wears dentures     full upper   partial lower     Wears glasses        Review of Systems   Constitutional:  Negative for activity change, appetite change, fatigue and unexpected weight change.   HENT:  Negative for congestion and nosebleeds.    Eyes: Negative.    Respiratory:  Negative for cough, chest tightness and shortness of breath (wilde- improved).    Cardiovascular:  Positive for palpitations (sound like PVC). Negative for chest pain and leg swelling.   Gastrointestinal:  Negative for abdominal distention.   Endocrine: Negative.    Genitourinary: Negative.    Musculoskeletal: Negative.    Skin: Negative.    Neurological:  Negative for dizziness, syncope and weakness.   Hematological: Negative.    Psychiatric/Behavioral: Negative.         Allergies   Allergen Reactions    Aciphex [Rabeprazole] Itching, Chest Pain, Shortness Of Breath and GI Intolerance     headache    Benzonatate Other (See Comments), Chest Pain and Shortness Of Breath     Chest pain    Biaxin [Clarithromycin] Other (See Comments), Chest Pain and Nausea Only     Reaction Date: 08Sep2011;   Chest pressure    weakness    Doxycycline GI Intolerance, Chest Pain, Itching and Shortness Of Breath    Avelox [Moxifloxacin] GI Intolerance, Chest Pain and Nausea Only     Reaction Date: 08Sep2011;     Co Q 10 [Coenzyme Q10]     Codeine GI Intolerance, Chest Pain and Nausea Only     Reaction Date: 08Sep2011;   weakness chest pain    Metoclopramide GI Intolerance    Nizatidine Other (See Comments), Chest Pain, Nausea Only, Vomiting and GI Intolerance     Nausea   Chest pain    Oxycodone GI Intolerance    Oxycodone-Acetaminophen Chest Pain and Nausea Only     Reaction Date: 08Sep2011;     Plecanatide     Pravastatin Other (See Comments), Headache and Nausea Only     headaches    Ranitidine Other (See Comments), Chest Pain and GI Intolerance     chest pain  Arm tingling    Ubidecarenone GI Intolerance    Caffeine - Food Allergy Other (See Comments) and Headache     migraines    Chlorhexidine Rash    Latex  Itching and Rash     Minor itching to the powder to gloves.   Minor itching to the powder to gloves.     Levaquin [Levofloxacin] Rash, Chest Pain, Nausea Only, Other (See Comments) and GI Intolerance     Reaction Date: 08Sep2011;     Other Rash and Edema     Reaction Date: 08Sep2011;   Syncope  Med is amortrpatcl? An antibiotic  Tape    Statins Rash    Sulfa Antibiotics Rash     .    Current Outpatient Medications:     acetaminophen (TYLENOL) 650 mg CR tablet, Take 650 mg by mouth   (Patient not taking: Reported on 9/12/2023), Disp: , Rfl:     Cholecalciferol (VITAMIN D3) 1000 UNIT/SPRAY LIQD, Vitamin D3 (Patient not taking: Reported on 9/12/2023), Disp: , Rfl:     diclofenac sodium (VOLTAREN) 1 %, Apply 4 g topically 4 (four) times a day as needed, Disp: , Rfl:     ezetimibe (ZETIA) 10 mg tablet, Take 1 tablet (10 mg total) by mouth daily (Patient not taking: Reported on 9/12/2023), Disp: 30 tablet, Rfl: 4    famotidine (PEPCID) 20 mg tablet, Take 1 tablet (20 mg total) by mouth every other day (Patient not taking: Reported on 3/12/2024), Disp: 15 tablet, Rfl: 1    levothyroxine 88 mcg tablet, Take 88 mcg by mouth daily, Disp: , Rfl:     losartan (COZAAR) 25 mg tablet, take 1 tablet by mouth once daily, Disp: 90 tablet, Rfl: 3    Multiple Vitamins-Minerals (ALIVE ONCE DAILY WOMENS 50+ PO), Alive Once Daily Women 50 Plus (Patient not taking: Reported on 9/12/2023), Disp: , Rfl:     nystatin (MYCOSTATIN) cream, Apply twice a day to rash areas (Patient not taking: No sig reported), Disp: , Rfl:     sodium chloride (OCEAN) 0.65 % nasal spray, 1 spray into each nostril as needed for congestion (Patient not taking: Reported on 3/7/2022), Disp: , Rfl:     triamcinolone (KENALOG) 0.1 % ointment, Apply topically 2 (two) times a day as needed, Disp: , Rfl:     Vitamin D, Cholecalciferol, 50 MCG (2000 UT) CAPS, Take 1 tablet by mouth in the morning (Patient not taking: Reported on 9/12/2023), Disp: , Rfl:     Social History      Socioeconomic History    Marital status:      Spouse name: Not on file    Number of children: 5    Years of education: Not on file    Highest education level: Not on file   Occupational History    Occupation: retired   Tobacco Use    Smoking status: Never    Smokeless tobacco: Never   Substance and Sexual Activity    Alcohol use: No    Drug use: No    Sexual activity: Not on file   Other Topics Concern    Not on file   Social History Narrative    Denied history of caffeine use    5 children 4 biological 1 adopted    Scientology affiliation [employer]    Uses safety equipment seatbelts     Social Drivers of Health     Financial Resource Strain: Not on file   Food Insecurity: Not on file   Transportation Needs: Not on file   Physical Activity: Not on file   Stress: Not on file   Social Connections: Not on file   Intimate Partner Violence: Not on file   Housing Stability: Not on file       Family History   Problem Relation Age of Onset    Breast cancer Mother     Stomach cancer Family     Colon cancer Family     Heart failure Family     Diabetes Family     Hypertension Family     Thyroid disease unspecified Family         disorder       Physical Exam:    Vitals: There were no vitals taken for this visit., There is no height or weight on file to calculate BMI.,   Wt Readings from Last 3 Encounters:   03/12/24 89.8 kg (198 lb)   09/12/23 93 kg (205 lb)   02/07/23 94.7 kg (208 lb 11.2 oz)       Physical Exam  Constitutional:       Appearance: She is well-developed.   HENT:      Head: Normocephalic and atraumatic.   Eyes:      Pupils: Pupils are equal, round, and reactive to light.   Neck:      Vascular: No JVD.   Cardiovascular:      Rate and Rhythm: Normal rate and regular rhythm.      Heart sounds: No murmur heard.  Pulmonary:      Effort: Pulmonary effort is normal. No respiratory distress.      Breath sounds: Normal breath sounds.   Abdominal:      General: There is no distension.      Palpations: Abdomen is  "soft.      Tenderness: There is no abdominal tenderness.   Musculoskeletal:         General: Normal range of motion.      Cervical back: Normal range of motion.   Skin:     General: Skin is warm and dry.      Findings: No rash.   Neurological:      Mental Status: She is alert and oriented to person, place, and time.       Labs & Results:    Lab Results   Component Value Date    SODIUM 138 03/15/2024    K 4.0 03/15/2024     03/15/2024    CO2 27 03/15/2024    BUN 18 03/15/2024    CREATININE 0.89 03/15/2024    GLUC 94 03/15/2024    CALCIUM 9.2 03/15/2024     Lab Results   Component Value Date    WBC 6.1 03/15/2024    HGB 12.9 03/15/2024    HCT 38.2 03/15/2024    MCV 98 03/15/2024     03/15/2024     No results found for: \"BNP\"   Lab Results   Component Value Date    CHOLESTEROL 259 (H) 03/15/2024    CHOLESTEROL 228 (H) 10/01/2018    CHOLESTEROL 209 (H) 10/24/2017     Lab Results   Component Value Date    HDL 51 03/15/2024    HDL 62 (H) 10/01/2018    HDL 74 (H) 10/24/2017     Lab Results   Component Value Date    TRIG 135 03/15/2024    TRIG 87 10/01/2018    TRIG 86 10/24/2017     No results found for: \"NONHDLC\"    EKG personally reviewed by Car Camacho.     Counseling / Coordination of Care  Time spent today 25 minutes.  Greater than 50% of total time was spent with the patient and / or family counseling and / or coordination of care.  We discussed diagnoses, most recent studies, tests and any changes in treatment plan  Thank you for the opportunity to participate in the care of this patient.    CAR CAMACHO D.O.  DIRECTOR OF HEART FAILURE/ PULMONARY HYPERTENSION  MEDICAL DIRECTOR OF LVAD PROGRAM  [facility]  "

## 2024-12-12 ENCOUNTER — OFFICE VISIT (OUTPATIENT)
Dept: CARDIOLOGY CLINIC | Facility: CLINIC | Age: 74
End: 2024-12-12
Payer: COMMERCIAL

## 2024-12-12 VITALS
BODY MASS INDEX: 31.81 KG/M2 | WEIGHT: 190.9 LBS | HEART RATE: 67 BPM | HEIGHT: 65 IN | OXYGEN SATURATION: 97 % | SYSTOLIC BLOOD PRESSURE: 112 MMHG | DIASTOLIC BLOOD PRESSURE: 66 MMHG

## 2024-12-12 DIAGNOSIS — G47.33 OBSTRUCTIVE SLEEP APNEA: Primary | ICD-10-CM

## 2024-12-12 DIAGNOSIS — E78.00 PURE HYPERCHOLESTEROLEMIA: ICD-10-CM

## 2024-12-12 DIAGNOSIS — I10 PRIMARY HYPERTENSION: ICD-10-CM

## 2024-12-12 DIAGNOSIS — I27.82 OTHER CHRONIC PULMONARY EMBOLISM WITHOUT ACUTE COR PULMONALE (HCC): ICD-10-CM

## 2024-12-12 PROCEDURE — 99214 OFFICE O/P EST MOD 30 MIN: CPT | Performed by: INTERNAL MEDICINE
